# Patient Record
Sex: FEMALE | Race: WHITE | NOT HISPANIC OR LATINO | Employment: OTHER | URBAN - METROPOLITAN AREA
[De-identification: names, ages, dates, MRNs, and addresses within clinical notes are randomized per-mention and may not be internally consistent; named-entity substitution may affect disease eponyms.]

---

## 2017-01-09 ENCOUNTER — OFFICE VISIT (OUTPATIENT)
Dept: OBGYN | Facility: CLINIC | Age: 72
End: 2017-01-09
Payer: COMMERCIAL

## 2017-01-09 VITALS
WEIGHT: 270 LBS | HEART RATE: 115 BPM | BODY MASS INDEX: 40.92 KG/M2 | SYSTOLIC BLOOD PRESSURE: 147 MMHG | DIASTOLIC BLOOD PRESSURE: 94 MMHG | HEIGHT: 68 IN

## 2017-01-09 DIAGNOSIS — N81.3 UTEROVAGINAL PROLAPSE, COMPLETE: Primary | ICD-10-CM

## 2017-01-09 DIAGNOSIS — Z46.89 PESSARY MAINTENANCE: ICD-10-CM

## 2017-01-09 PROCEDURE — 99213 OFFICE O/P EST LOW 20 MIN: CPT | Performed by: OBSTETRICS & GYNECOLOGY

## 2017-01-09 NOTE — NURSING NOTE
"Initial /94 mmHg  Pulse 115  Ht 5' 7.5\" (1.715 m)  Wt 270 lb (122.471 kg)  BMI 41.64 kg/m2 Estimated body mass index is 41.64 kg/(m^2) as calculated from the following:    Height as of this encounter: 5' 7.5\" (1.715 m).    Weight as of this encounter: 270 lb (122.471 kg). .      "

## 2017-01-09 NOTE — PROGRESS NOTES
"CC:  pessary check  HPI:  Melania Alicea is a 71 year old female     pessary Fit with Gelhorn pessary 6/9/2014 is in place.   No vaginal bleeding or pain noted.  Patient is  not removing the pessary     Allergies: Nickel and Sulfa drugs    ROS:  as per HPI      EXAM:  Blood pressure 147/94, pulse 115, height 5' 7.5\" (1.715 m), weight 270 lb (122.471 kg), not currently breastfeeding.  General - pleasant female in no acute distress.  Pelvic - EG: normal adult female, BUS: within normal limits, Vagina: slightly atrophic, no discharge, vaginal walls all WNL, no lesions seen.   Rectovaginal - deferred.  Psychiactric - appropriate mood and affect  Neurological - alert and oriented X 3    Gelhorn pessary was removed, and a new Gelhorn was  inserted.  A vaginal inspection was done.      ASSESSMENT/PLAN:  (N81.3) Uterovaginal prolapse, complete  (primary encounter diagnosis)  Comment:   Plan: FIT/INSERT INTRAVAG SUPPORT DEVICE/PESSARY                    Will have patient continue use of vaginal estrogen tablets 2x weekly  and RTC in 3 months for a re-check of the vaginal walls. Will call clinic and come in sooner if problems developing.    Jarvis Walls MD    "

## 2017-01-18 ENCOUNTER — TELEPHONE (OUTPATIENT)
Dept: OBGYN | Facility: CLINIC | Age: 72
End: 2017-01-18

## 2017-01-18 NOTE — Clinical Note
[facility]  [address] [place] [address]  [phone]    January 18, 2017    Melania Alicea  [address]  [place] [address]          Dear Melania,    This letter is to remind you that you are due for your mammogram.    Please call [phone] to schedule your appointment at your earliest convenience.     If you have transferred your care elsewhere, please contact our office and we would be happy to forward your records. If you have any financial concerns regarding this appointment, please call so that we can discuss this further.      Sincerely,    Lisa Quarles MD/tk

## 2017-01-18 NOTE — TELEPHONE ENCOUNTER
Panel Management Review      Patient has the following on her problem list: None      Composite cancer screening  Chart review shows that this patient is due/due soon for the following Mammogram  Summary:    Patient is due/failing the following:   MAMMOGRAM    Action needed:   Patient needs office visit for mammogram.    Type of outreach:    Sent letter.    Questions for provider review:    None                                                                                                                                    Maria De Jesus Ferrer

## 2017-03-15 ENCOUNTER — OFFICE VISIT (OUTPATIENT)
Dept: FAMILY MEDICINE | Facility: CLINIC | Age: 72
End: 2017-03-15
Payer: COMMERCIAL

## 2017-03-15 VITALS
OXYGEN SATURATION: 99 % | TEMPERATURE: 99 F | WEIGHT: 273 LBS | SYSTOLIC BLOOD PRESSURE: 146 MMHG | BODY MASS INDEX: 42.13 KG/M2 | HEART RATE: 72 BPM | DIASTOLIC BLOOD PRESSURE: 76 MMHG

## 2017-03-15 DIAGNOSIS — I87.2 VENOUS STASIS DERMATITIS OF RIGHT LOWER EXTREMITY: ICD-10-CM

## 2017-03-15 DIAGNOSIS — L03.115 CELLULITIS OF RIGHT LOWER EXTREMITY: Primary | ICD-10-CM

## 2017-03-15 DIAGNOSIS — G60.0 CHARCOT MARIE TOOTH MUSCULAR ATROPHY: ICD-10-CM

## 2017-03-15 DIAGNOSIS — E66.01 MORBID OBESITY, UNSPECIFIED OBESITY TYPE (H): ICD-10-CM

## 2017-03-15 PROCEDURE — 99214 OFFICE O/P EST MOD 30 MIN: CPT | Performed by: NURSE PRACTITIONER

## 2017-03-15 PROCEDURE — 87186 SC STD MICRODIL/AGAR DIL: CPT | Performed by: NURSE PRACTITIONER

## 2017-03-15 PROCEDURE — 87077 CULTURE AEROBIC IDENTIFY: CPT | Performed by: NURSE PRACTITIONER

## 2017-03-15 PROCEDURE — 87070 CULTURE OTHR SPECIMN AEROBIC: CPT | Performed by: NURSE PRACTITIONER

## 2017-03-15 RX ORDER — CEPHALEXIN 500 MG/1
500 CAPSULE ORAL 3 TIMES DAILY
Qty: 30 CAPSULE | Refills: 0 | Status: SHIPPED | OUTPATIENT
Start: 2017-03-15 | End: 2017-03-29

## 2017-03-15 RX ORDER — MUPIROCIN 20 MG/G
OINTMENT TOPICAL 3 TIMES DAILY
Qty: 22 G | Refills: 1 | Status: SHIPPED | OUTPATIENT
Start: 2017-03-15 | End: 2017-03-20

## 2017-03-15 NOTE — PROGRESS NOTES
SUBJECTIVE:                                                    Melania Alicea is a 71 year old female who presents to clinic today for the following health issues:      Rash      Duration: 1 week     Description  Location: right lower leg   Psoriasis with open lesions.   Redness and LE pain.   Itching: no    Intensity:  moderate    Accompanying signs and symptoms: drainage started about 3 days ago     History (similar episodes/previous evaluation): None    Precipitating or alleviating factors:  New exposures:  None  Recent travel: no      Therapies tried and outcome: none     Lower extremity weakness and knee pain chronic and ongoing  -------------------------------------    Problem list and histories reviewed & adjusted, as indicated.    ROS:   ROS: 10 point ROS neg other than the symptoms noted above in the HPI.      OBJECTIVE:  /76  Pulse 72  Temp 99  F (37.2  C) (Tympanic)  Wt 273 lb (123.8 kg)  SpO2 99%  BMI 42.13 kg/m2  EXAM:  GENERAL APPEARANCE: healthy, alert and mild  distress  EYES: EOMI,  PERRL  HENT: ear canals and TM's normal and nose and mouth without ulcers or lesions  RESP: lungs clear to auscultation - no rales, rhonchi or wheezes  CV: regular rates and rhythm, normal S1 S2, no S3 or S4 and no murmur, click or rub -  ABDOMEN:  soft, nontender, no HSM or masses and bowel sounds normal  SKIN: Cellulitis right lower extremity with pussy drainage from the right posterior leg wound culture obtained  Venous insufficiency present bilaterally with varicosities present  Results for orders placed or performed in visit on 03/15/17   Wound Culture Aerobic Bacterial   Result Value Ref Range    Specimen Description Right Leg LOWER     Culture Micro (A)      Heavy growth Beta hemolytic Streptococcus group C  Heavy growth Coagulase negative Staphylococcus Susceptibility testing not   routinely done      Micro Report Status FINAL 03/18/2017     Organism: Heavy growth Beta hemolytic Streptococcus group C         "Susceptibility    Heavy growth beta hemolytic streptococcus group c (west gram pos panel) -  (no method available)     AMPICILLIN <=0.06 Susceptible  ug/mL     PENICILLIN <=0.03 Susceptible  ug/mL     VANCOMYCIN 0.25 Susceptible  ug/mL     CEFOTAXIME <=0.25 Susceptible  ug/mL     CEFTRIAXONE <=0.25 Susceptible  ug/mL     ERYTHROMYCIN >0.5 Resistant  ug/mL     CLINDAMYCIN >0.5 Resistant  ug/mL         ASSESSMENT/PLAN:  (L03.115) Cellulitis of right lower extremity  (primary encounter diagnosis)  Plan: cephALEXin (KEFLEX) 500 MG capsule, HOME CARE         NURSING REFERRAL, Wound Culture Aerobic         Bacterial,         Start oral antibiotic  Wound care for daily dressing changes  Follow-up next week    (I83.11) Venous stasis dermatitis of right lower extremity  Comment: Symptomatic cares strategies reviewed  Plan: Ace wrap from knees to toes recommended on in the morning off at night     (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    Weight as of this encounter: 273 lb (123.8 kg).  Plan:   Continue to work on dietary changes to promote weight loss and increasing physical activity as able    Call or return to the clinic with any worsening of symptoms or no resolution. Patient/Parent verbalized understanding and is in agreement. Medication side effects reviewed.   Current Outpatient Prescriptions   Medication Sig Dispense Refill     cephALEXin (KEFLEX) 500 MG capsule Take 1 capsule (500 mg) by mouth 3 times daily 30 capsule 0     mupirocin (BACTROBAN) 2 % ointment Apply topically 3 times daily for 5 days 22 g 1     estradiol (ESTRACE) 0.5 MG tablet Place vaginally twice a week 24 tablet 3     VITAMIN D, CHOLECALCIFEROL, " PO Take 1,000 Units by mouth daily       ADVIL 200 MG PO CAPS ONE TO TWO TABLETS EVERY 6-8 HOURS AS NEEDED  0     [facility]  [phone] [address] [address]

## 2017-03-15 NOTE — NURSING NOTE
"Chief Complaint   Patient presents with     Derm Problem     right leg rash swelling drainage 1 week        Initial /72  Pulse 72  Temp 99  F (37.2  C) (Tympanic)  Wt 273 lb (123.8 kg)  SpO2 99%  BMI 42.13 kg/m2 Estimated body mass index is 42.13 kg/(m^2) as calculated from the following:    Height as of 1/9/17: 5' 7.5\" (1.715 m).    Weight as of this encounter: 273 lb (123.8 kg).  Medication Reconciliation: complete    Health Maintenance that is potentially due pending provider review:  NONE    n/a    "

## 2017-03-15 NOTE — PATIENT INSTRUCTIONS
Discharge Instructions for Cellulitis  You have been diagnosed with cellulitis. This is an infection in the deepest layer of the skin. In some cases, the infection also affects the muscle. Cellulitis is caused by bacteria. The bacteria can enter the body through broken skin. This can happen with a cut, scratch, animal bite, or an insect bite that has been scratched. You may have been treated in the hospital with antibiotics and fluids. You will likely be given a prescription for antibiotics to take at home. This sheet will help you take care of yourself at home.  Home Care  When you are home:    Take the prescribed antibiotic medication you are given as directed until it is gone. Take it even if you feel better. It treats the infection and stops it from returning. Not taking all of the medication can make future infections hard to treat.    Keep the infected area clean.    When possible, raise the infected area above the level of your heart. This helps keep swelling down.    Talk to your doctor if you are in pain. Ask what kind of over-the-counter medication you can take for pain.    Apply clean bandages as advised.    Take your temperature once a day for a week.    Wash your hands often to prevent spreading the infection.  In the future, wash your hands before and after you touch cuts, scratches, or bandages. This will help prevent infection.   When to Call Your Doctor  Call your doctor immediately if you have any of the following:    Vomiting    Fever of100.4 F (38 C) or higher, or as directed by your health care provider    Shaking chills    Redness that gets worse in or around the infected area    Swelling of the infected area    Pain that gets worse in or around the infected area    Difficulty or pain when moving the joints above or below the infected area    Discharge or pus draining from the area     6610-3043 [employer]. [address] [place] [address], [place] [address]. All rights reserved. This  information is not intended as a substitute for professional medical care. Always follow your healthcare professional's instructions.        Understanding Chronic Venous Insufficiency  Problems with the veins in the legs may lead to chronic venous insufficiency (CVI). CVI means that there is a long-term problem with the veins not working well. Because of it, blood stays in the legs and causes swelling.   Two problems that may lead to chronic venous insufficiency are:    Damaged valves. Valves keep blood flowing from the legs through the blood vessels and back to the heart. When the valves are damaged, blood does not flow as well.     Deep vein thrombosis (DVT). Blood clots may form in the deep veins of the legs. This may cause pain, redness, and swelling in the legs. It may also block the flow of blood back to the heart. Call your health care provider if you have these symptoms.    A blood clot in the leg can also break off and travel to the lungs. This is called pulmonary embolism (PE). In the lungs, the clot can cut off the flow of blood. This may cause chest pain, trouble breathing, sweating, a fast heartbeat, coughing (may cough up blood), and fainting. It is a medical emergency and may cause death. Call 911 if you have these symptoms.    Health care providers call the 2 conditions, DVT and PE, venous thromboembolism (VTE).  CVI can t be cured, but you can control leg swelling to reduce the likelihood of ulcers (sores).  Recognizing the symptoms    If you stand or sit with your feet down for long periods, your legs may ache or feel heavy.    Swollen ankles are possibly the most common symptom of CVI.    As swelling increases, the skin over your ankles may show red spots or a brownish tinge. The skin may feel leathery or scaly, and may start to itch.    If swelling is not controlled, an ulcer (open wound) may form.  What you can do  Reduce your risk of developing ulcers by doing the following:    Increase blood  flow back to your heart by elevating your legs, exercising daily, and wearing elastic stockings.    Boost blood flow in your legs by losing excess weight.    If you must stand or sit in 1 place for a period of time, keep your blood moving by wiggling your toes, shifting your body position, and rising up on the balls of your feet.      [id number] [employer]. [address] [place] [address], [place] [address]. All rights reserved. This information is not intended as a substitute for professional medical care. Always follow your healthcare professional's instructions.

## 2017-03-15 NOTE — MR AVS SNAPSHOT
After Visit Summary   3/15/2017    Melania Alicea    MRN: [id number]           Patient Information     Date Of Birth          1945        Visit Information        Provider Department      3/15/2017 10:20 AM Betty Arenas APRN [facility]        Today's Diagnoses     Cellulitis of right lower extremity    -  1    Venous stasis dermatitis of right lower extremity        Charcot Emilee Tooth muscular atrophy        Morbid obesity, unspecified obesity type (H)          Care Instructions      Discharge Instructions for Cellulitis  You have been diagnosed with cellulitis. This is an infection in the deepest layer of the skin. In some cases, the infection also affects the muscle. Cellulitis is caused by bacteria. The bacteria can enter the body through broken skin. This can happen with a cut, scratch, animal bite, or an insect bite that has been scratched. You may have been treated in the hospital with antibiotics and fluids. You will likely be given a prescription for antibiotics to take at home. This sheet will help you take care of yourself at home.  Home Care  When you are home:    Take the prescribed antibiotic medication you are given as directed until it is gone. Take it even if you feel better. It treats the infection and stops it from returning. Not taking all of the medication can make future infections hard to treat.    Keep the infected area clean.    When possible, raise the infected area above the level of your heart. This helps keep swelling down.    Talk to your doctor if you are in pain. Ask what kind of over-the-counter medication you can take for pain.    Apply clean bandages as advised.    Take your temperature once a day for a week.    Wash your hands often to prevent spreading the infection.  In the future, wash your hands before and after you touch cuts, scratches, or bandages. This will help prevent infection.   When to Call Your Doctor  Call your doctor  immediately if you have any of the following:    Vomiting    Fever of100.4 F (38 C) or higher, or as directed by your health care provider    Shaking chills    Redness that gets worse in or around the infected area    Swelling of the infected area    Pain that gets worse in or around the infected area    Difficulty or pain when moving the joints above or below the infected area    Discharge or pus draining from the area     9109-8061 [employer]. [address] [place] [address], [place] [address]. All rights reserved. This information is not intended as a substitute for professional medical care. Always follow your healthcare professional's instructions.        Understanding Chronic Venous Insufficiency  Problems with the veins in the legs may lead to chronic venous insufficiency (CVI). CVI means that there is a long-term problem with the veins not working well. Because of it, blood stays in the legs and causes swelling.   Two problems that may lead to chronic venous insufficiency are:    Damaged valves. Valves keep blood flowing from the legs through the blood vessels and back to the heart. When the valves are damaged, blood does not flow as well.     Deep vein thrombosis (DVT). Blood clots may form in the deep veins of the legs. This may cause pain, redness, and swelling in the legs. It may also block the flow of blood back to the heart. Call your health care provider if you have these symptoms.    A blood clot in the leg can also break off and travel to the lungs. This is called pulmonary embolism (PE). In the lungs, the clot can cut off the flow of blood. This may cause chest pain, trouble breathing, sweating, a fast heartbeat, coughing (may cough up blood), and fainting. It is a medical emergency and may cause death. Call 911 if you have these symptoms.    Health care providers call the 2 conditions, DVT and PE, venous thromboembolism (VTE).  CVI can t be cured, but you can control leg swelling to reduce  the likelihood of ulcers (sores).  Recognizing the symptoms    If you stand or sit with your feet down for long periods, your legs may ache or feel heavy.    Swollen ankles are possibly the most common symptom of CVI.    As swelling increases, the skin over your ankles may show red spots or a brownish tinge. The skin may feel leathery or scaly, and may start to itch.    If swelling is not controlled, an ulcer (open wound) may form.  What you can do  Reduce your risk of developing ulcers by doing the following:    Increase blood flow back to your heart by elevating your legs, exercising daily, and wearing elastic stockings.    Boost blood flow in your legs by losing excess weight.    If you must stand or sit in 1 place for a period of time, keep your blood moving by wiggling your toes, shifting your body position, and rising up on the balls of your feet.      4661-9330 The "[employer]. [address] [place] [address]. All rights reserved. This information is not intended as a substitute for professional medical care. Always follow your healthcare professional's instructions.              Follow-ups after your visit        Additional Services     HOME CARE NURSING REFERRAL       **Order classes of: [facility], [facility] and [facility] will route to the Home Care and Hospice Referral Pool.  Home Care or Hospice will then contact the patient to schedule their appointment.**    If you do not hear from Home Care and Hospice, or you would like to call to schedule, please call the referring place of service that your provider has listed below.  ______________________________________________________________________    Your provider has referred you to: [facility]: [facility] [phone]   [email or web address]    Extended Emergency Contact Information  Primary Emergency Contact: JUAN stephens   [facility]  Phone: [phone]  Work Phone: [phone]  Mobile Phone: [phone]  Relation: Daughter    Patient Anticipated Discharge Date: March 15, 2017     RN, PT, HHA to begin 24 - 48 hours after discharge.  PLEASE EVALUATE AND TREAT (Evaluation timeline is 24 - 48 hrs. Please call if there is need for a variance to this timeline).    REASON FOR REFERRAL: Wound Care - Specialty Treatment Orders: RLE cellulitis     ADDITIONAL SERVICES NEEDED:     OTHER PERTINENT INFORMATION: Patient was last seen by provider on 3/15/2017 for cellulitis and wound care RLE .    Current Outpatient Prescriptions:  cephALEXin (KEFLEX) 500 MG capsule, Take 1 capsule (500 mg) by mouth 3 times daily, Disp: 30 capsule, Rfl: 0  mupirocin (BACTROBAN) 2 % ointment, Apply topically 3 times daily for 5 days, Disp: 22 g, Rfl: 1  estradiol (ESTRACE) 0.5 MG tablet, Place vaginally twice a week, Disp: 24 tablet, Rfl: 3  VITAMIN D, CHOLECALCIFEROL, PO, Take 1,000 Units by mouth daily, Disp: , Rfl:   ADVIL 200 MG PO CAPS, ONE TO TWO TABLETS EVERY 6-8 HOURS AS NEEDED, Disp: , Rfl: 0      Patient Active Problem List:     Charcot Emilee Tooth muscular atrophy     Right knee pain     Obesity     Mixed incontinence urge and stress     Infection of eyelid     CARDIOVASCULAR SCREENING; LDL GOAL LESS THAN 160     Advanced directives, counseling/discussion     Tubular adenoma of rectum     Uterovaginal prolapse, complete     Vitamin D deficiency     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 difficile colitis     Pessary maintenance      Documentation of Face to Face and Certification for Home Health Services    I certify that patientMelania is under my care and that I, or a Nurse Practitioner or Physician's Assistant working with me, had a face-to-face encounter that meets the physician face-to-face encounter requirements with this patient on: 3/15/2017.    This encounter with the patient was in whole, or in part, for the  following medical condition, which is the primary reason for Home Health Care: YES .    I certify that, based on my findings, the following services are medically necessary Home Health Services: Nursing    My clinical findings support the need for the above services because: Nurse is needed: To provide assessment and oversight required in the home to assure adherence to the medical plan due to: WOUND RLE and CELLULITIS..    Further, I certify that my clinical findings support that this patient is homebound (i.e. absences from home require considerable and taxing effort and are for medical reasons or Judaism services or infrequently or of short duration when for other reasons) because: Leaving home is medically contraindicated for the following reason(s): Infection risk / immunocompromised state where it is safer for them to receive services in the home.. and Requires assistance of another person or specialized equipment to access medical services because patient: Has prohibitive pain during ambulation. and Is unable to walk greater than 100 feet without rest..    Based on the above findings, I certify that this patient is confined to the home and needs intermittent skilled nursing care, physical therapy and/or speech therapy.  The patient is under my care, and I have initiated the establishment of the plan of care.  This patient will be followed by a physician who will periodically review the plan of care.    Physician/Provider to provide follow up care: Betty Arenas certified Physician at time of discharge: DR PEG THOMPSON    Please be aware that coverage of these services is subject to the terms and limitations of your health insurance plan.  Call member services at your health plan with any benefit or coverage questions.                  Your next 10 appointments already scheduled     Apr 10, 2017 11:00 AM DILAN   SHORT with Kristi Merchant MD   [facility]  [place] ([facility])    [address]  [facility] [address]   [phone]              Who to contact     If you have questions or need follow up information about today's clinic visit or your schedule please contact [facility] directly at [phone].  Normal or non-critical lab and imaging results will be communicated to you by MyChart, letter or phone within 4 business days after the clinic has received the results. If you do not hear from us within 7 days, please contact the clinic through [employer] or phone. If you have a critical or abnormal lab result, we will notify you by phone as soon as possible.  Submit refill requests through [employer] or call your pharmacy and they will forward the refill request to us. Please allow 3 business days for your refill to be completed.          Additional Information About Your Visit        MyChart Information     [employer] gives you secure access to your electronic health record. If you see a primary care provider, you can also send messages to your care team and make appointments. If you have questions, please call your primary care clinic.  If you do not have a primary care provider, please call [phone] and they will assist you.        Care EveryWhere ID     This is your Care EveryWhere ID. This could be used by other organizations to access your [place] medical records  [id number]        Your Vitals Were     Pulse Temperature Pulse Oximetry BMI (Body Mass Index)          72 99  F (37.2  C) (Tympanic) 99% 42.13 kg/m2         Blood Pressure from Last 3 Encounters:   03/15/17 146/76   01/09/17 (!) 147/94   11/10/16 138/62    Weight from Last 3 Encounters:   03/15/17 273 lb (123.8 kg)   01/09/17 270 lb (122.5 kg)   11/10/16 271 lb (122.9 kg)              We Performed the Following     Gram stain     HOME CARE NURSING REFERRAL     Wound Culture Aerobic Bacterial          Today's Medication Changes          These changes are  accurate as of: 3/15/17 11:36 AM.  If you have any questions, ask your nurse or doctor.               Start taking these medicines.        Dose/Directions    cephALEXin 500 MG capsule   Commonly known as:  KEFLEX   Used for:  Cellulitis of right lower extremity, Venous stasis dermatitis of right lower extremity   Started by:  Betty Arenas APRN CNP        Dose:  500 mg   Take 1 capsule (500 mg) by mouth 3 times daily   Quantity:  30 capsule   Refills:  0       mupirocin 2 % ointment   Commonly known as:  BACTROBAN   Used for:  Venous stasis dermatitis of right lower extremity   Started by:  Betty Arenas APRN CNP        Apply topically 3 times daily for 5 days   Quantity:  22 g   Refills:  1         Stop taking these medicines if you haven't already. Please contact your care team if you have questions.     metroNIDAZOLE 500 MG tablet   Commonly known as:  FLAGYL   Stopped by:  Betty Arenas APRN CNP           mometasone 0.1 % ointment   Commonly known as:  ELOCON   Stopped by:  Betty Arenas APRN CNP                Where to get your medicines      These medications were sent to [facility] - [place], [place] - [address]  [address] [place] [address]     Phone:  [phone]     cephALEXin 500 MG capsule    mupirocin 2 % ointment                Primary Care Provider Office Phone # Fax #    JOANN oDrsey [phone]       [facility] [address] [facility] [address]        Thank you!     Thank you for choosing [facility]  for your care. Our goal is always to provide you with excellent care. Hearing back from our patients is one way we can continue to improve our services. Please take a few minutes to complete the written survey that you may receive in the mail after your visit with us. Thank you!             Your Updated Medication List - Protect others around you: Learn how to safely use, store and throw away  your medicines at www.disposemymeds.org.          This list is accurate as of: 3/15/17 11:36 AM.  Always use your most recent med list.                   Brand Name Dispense Instructions for use    ADVIL 200 MG capsule   Generic drug:  ibuprofen      ONE TO TWO TABLETS EVERY 6-8 HOURS AS NEEDED       cephALEXin 500 MG capsule    KEFLEX    30 capsule    Take 1 capsule (500 mg) by mouth 3 times daily       estradiol 0.5 MG tablet    ESTRACE    24 tablet    Place vaginally twice a week       mupirocin 2 % ointment    BACTROBAN    22 g    Apply topically 3 times daily for 5 days       VITAMIN D (CHOLECALCIFEROL) PO      Take 1,000 Units by mouth daily

## 2017-03-18 ENCOUNTER — TELEPHONE (OUTPATIENT)
Dept: CARE COORDINATION | Facility: CLINIC | Age: 72
End: 2017-03-18

## 2017-03-18 LAB
BACTERIA SPEC CULT: ABNORMAL
MICRO REPORT STATUS: ABNORMAL
MICROORGANISM SPEC CULT: ABNORMAL
SPECIMEN SOURCE: ABNORMAL

## 2017-03-18 NOTE — TELEPHONE ENCOUNTER
Ok to admit to [facility] for skilled Nursing  2 x week and then weekly for skin/wound cares    Pia Weston RN  [place] [facility]   [phone]    [facility] now requests orders and shares plan of care/discharge summaries for some patients through [facility].  Please REPLY TO THIS MESSAGE in order to give authorization for orders when needed.  This is considered a verbal order, you will still receive a faxed copy of orders for signature.  Thank you for your assistance in improving collaboration for our patients.

## 2017-03-21 ENCOUNTER — MYC MEDICAL ADVICE (OUTPATIENT)
Dept: FAMILY MEDICINE | Facility: CLINIC | Age: 72
End: 2017-03-21

## 2017-03-23 ENCOUNTER — TELEPHONE (OUTPATIENT)
Dept: FAMILY MEDICINE | Facility: CLINIC | Age: 72
End: 2017-03-23

## 2017-03-23 NOTE — TELEPHONE ENCOUNTER
Reason for Call:  Form, our goal is to have forms completed with 72 hours, however, some forms may require a visit or additional information.    Type of letter, form or note:  [facility]    Who is the form from?: [facility] (if other please explain)    Where did the form come from: form was faxed in    What clinic location was the form placed at?: [place]    Where the form was placed: 's Box    Call taken on 3/23/2017 at 9:22 AM by Annabelle Daniels

## 2017-03-29 ENCOUNTER — OFFICE VISIT (OUTPATIENT)
Dept: FAMILY MEDICINE | Facility: CLINIC | Age: 72
End: 2017-03-29
Payer: COMMERCIAL

## 2017-03-29 VITALS
DIASTOLIC BLOOD PRESSURE: 80 MMHG | TEMPERATURE: 99.1 F | OXYGEN SATURATION: 96 % | WEIGHT: 274 LBS | HEART RATE: 75 BPM | SYSTOLIC BLOOD PRESSURE: 122 MMHG | BODY MASS INDEX: 42.28 KG/M2

## 2017-03-29 DIAGNOSIS — I87.2 VENOUS STASIS DERMATITIS OF RIGHT LOWER EXTREMITY: ICD-10-CM

## 2017-03-29 DIAGNOSIS — L03.115 CELLULITIS OF RIGHT LOWER EXTREMITY: Primary | ICD-10-CM

## 2017-03-29 DIAGNOSIS — A49.01 INFECTION DUE TO STAPHYLOCOCCUS AUREUS: ICD-10-CM

## 2017-03-29 PROCEDURE — 99214 OFFICE O/P EST MOD 30 MIN: CPT | Performed by: NURSE PRACTITIONER

## 2017-03-29 RX ORDER — CEPHALEXIN 500 MG/1
500 CAPSULE ORAL 3 TIMES DAILY
Qty: 30 CAPSULE | Refills: 0 | Status: SHIPPED | OUTPATIENT
Start: 2017-03-29 | End: 2017-05-11

## 2017-03-29 RX ORDER — TRIAMCINOLONE ACETONIDE OINTMENT USP, 0.05% 0.5 MG/G
OINTMENT TOPICAL 2 TIMES DAILY PRN
Qty: 17 G | Refills: 0 | Status: SHIPPED | OUTPATIENT
Start: 2017-03-29 | End: 2017-03-29

## 2017-03-29 RX ORDER — TRIAMCINOLONE ACETONIDE 5 MG/G
OINTMENT TOPICAL
Qty: 15 G | Refills: 2 | Status: SHIPPED | OUTPATIENT
Start: 2017-03-29 | End: 2017-05-11

## 2017-03-29 NOTE — PATIENT INSTRUCTIONS
Trail Unna boot today. RN evaluation of tolerance tomorrow.   If tolerated then can use and change 3 times weekly.  If not tolerated can use mediplast honey and triamcinolone for symptomatic relief.   Continue keflex for 10 more days.   Tylenol #3 for pain not relieved by more conservative measures.       UNNA BOOT   Elevate the affected extremity when seated and avoid prolonged standing. If pain increases or new pain is experienced that is not alleviated with elevation, or there is discoloration or numbness of the foot or toes, the bandage should be removed immediately.   Keep the dressing dry; a plastic bag can be placed over the leg when showering.   If tolerating this well then compression bandage changed once or twice per week. If there is excessive drainage or foul odor,  return to have the dressing changed; more frequent dressing changes up to three times a week may be needed until drainage is controlled.

## 2017-03-29 NOTE — NURSING NOTE
"Chief Complaint   Patient presents with     Leg Swelling     recheck cellulitis       Initial /80  Pulse 75  Temp 99.1  F (37.3  C) (Tympanic)  Wt 274 lb (124.3 kg)  SpO2 96%  BMI 42.28 kg/m2 Estimated body mass index is 42.28 kg/(m^2) as calculated from the following:    Height as of 1/9/17: 5' 7.5\" (1.715 m).    Weight as of this encounter: 274 lb (124.3 kg).  Medication Reconciliation: complete    Health Maintenance that is potentially due pending provider review:  NONE    n/a      "

## 2017-03-29 NOTE — MR AVS SNAPSHOT
After Visit Summary   3/29/2017    Melania Alicea    MRN: [id number]           Patient Information     Date Of Birth          1945        Visit Information        Provider Department      3/29/2017 2:00 PM Betty Arenas APRN CNP [facility]        Today's Diagnoses     Cellulitis of right lower extremity    -  1    Venous stasis dermatitis of right lower extremity          Care Instructions    Trail Unna boot today. RN evaluation of tolerance tomorrow.   If tolerated then can use and change 3 times weekly.  If not tolerated can use mediplast honey and triamcinolone for symptomatic relief.   Continue keflex for 10 more days.   Tylenol #3 for pain not relieved by more conservative measures.       UNNA BOOT   Elevate the affected extremity when seated and avoid prolonged standing. If pain increases or new pain is experienced that is not alleviated with elevation, or there is discoloration or numbness of the foot or toes, the bandage should be removed immediately.   Keep the dressing dry; a plastic bag can be placed over the leg when showering.   If tolerating this well then compression bandage changed once or twice per week. If there is excessive drainage or foul odor,  return to have the dressing changed; more frequent dressing changes up to three times a week may be needed until drainage is controlled.          Follow-ups after your visit        Your next 10 appointments already scheduled     Apr 03, 2017  2:20 PM CDT   US LOWER EXTREMITY VENOUS DUPLEX RIGHT with RCUS[facility]   [facility]    [address] [address] [phone]           Please bring a list of your medicines (including vitamins, minerals and over-the-counter drugs). Also, tell your doctor about any allergies you may have. Wear comfortable clothes and leave your valuables at home.  You do not need to do anything special to prepare for your exam.   Please call the Imaging Department at your exam site with any questions.            Apr 10, 2017 11:00 AM CDT   SHORT with Kristi Merchant MD   [facility]    [address] [address]   [phone]              Future tests that were ordered for you today     Open Future Orders        Priority Expected Expires Ordered    US Lower Extremity Venous Duplex Right Routine  3/29/2018 3/29/2017            Who to contact     If you have questions or need follow up information about today's clinic visit or your schedule please contact [facility] directly at [phone].  Normal or non-critical lab and imaging results will be communicated to you by MyChart, letter or phone within 4 business days after the clinic has received the results. If you do not hear from us within 7 days, please contact the clinic through [employer] or phone. If you have a critical or abnormal lab result, we will notify you by phone as soon as possible.  Submit refill requests through [employer] or call your pharmacy and they will forward the refill request to us. Please allow 3 business days for your refill to be completed.          Additional Information About Your Visit        MyChart Information     [employer] gives you secure access to your electronic health record. If you see a primary care provider, you can also send messages to your care team and make appointments. If you have questions, please call your primary care clinic.  If you do not have a primary care provider, please call [phone] and they will assist you.        Care EveryWhere ID     This is your Care EveryWhere ID. This could be used by other organizations to access your [place] medical records  [id number]        Your Vitals Were     Pulse Temperature Pulse Oximetry BMI (Body Mass Index)          75 99.1  F (37.3  C) (Tympanic) 96% 42.28 kg/m2         Blood Pressure from Last 3 Encounters:    03/29/17 122/80   03/15/17 146/76   01/09/17 (!) 147/94    Weight from Last 3 Encounters:   03/29/17 274 lb (124.3 kg)   03/15/17 273 lb (123.8 kg)   01/09/17 270 lb (122.5 kg)                 Today's Medication Changes          These changes are accurate as of: 3/29/17  2:59 PM.  If you have any questions, ask your nurse or doctor.               Start taking these medicines.        Dose/Directions    acetaminophen-codeine 300-30 MG per tablet   Commonly known as:  TYLENOL #3   Used for:  Cellulitis of right lower extremity   Started by:  Betty Arenas APRN CNP        Dose:  1 tablet   Take 1 tablet by mouth every 6 hours as needed for pain maximum 3 tablet(s) per day   Quantity:  18 tablet   Refills:  0       triamcinolone acetonide 0.05 % Oint   Used for:  Cellulitis of right lower extremity   Started by:  Betty Arenas APRN CNP        Externally apply topically 2 times daily as needed   Quantity:  17 g   Refills:  0            Where to get your medicines      These medications were sent to [facility] - [place], [place] - [address]  [address] [place] [address]     Phone:  [phone]     cephALEXin 500 MG capsule    triamcinolone acetonide 0.05 % Oint         Some of these will need a paper prescription and others can be bought over the counter.  Ask your nurse if you have questions.     Bring a paper prescription for each of these medications     acetaminophen-codeine 300-30 MG per tablet                Primary Care Provider Office Phone # Fax #    JOANN Dorsey [phone]       [facility] [address] [facility] [address]        Thank you!     Thank you for choosing [facility]  for your care. Our goal is always to provide you with excellent care. Hearing back from our patients is one way we can continue to improve our services. Please take a few minutes to complete the written survey that you may receive in the mail after  your visit with us. Thank you!             Your Updated Medication List - Protect others around you: Learn how to safely use, store and throw away your medicines at www.disposemymeds.org.          This list is accurate as of: 3/29/17  2:59 PM.  Always use your most recent med list.                   Brand Name Dispense Instructions for use    acetaminophen-codeine 300-30 MG per tablet    TYLENOL #3    18 tablet    Take 1 tablet by mouth every 6 hours as needed for pain maximum 3 tablet(s) per day       ADVIL 200 MG capsule   Generic drug:  ibuprofen      ONE TO TWO TABLETS EVERY 6-8 HOURS AS NEEDED       cephALEXin 500 MG capsule    KEFLEX    30 capsule    Take 1 capsule (500 mg) by mouth 3 times daily       estradiol 0.5 MG tablet    ESTRACE    24 tablet    Place vaginally twice a week       [facility] WOUND/BURN DRESSING EX          triamcinolone acetonide 0.05 % Oint     17 g    Externally apply topically 2 times daily as needed       TRIDERMA FORTE Crea          VITAMIN D (CHOLECALCIFEROL) PO      Take 1,000 Units by mouth daily

## 2017-03-30 ENCOUNTER — TELEPHONE (OUTPATIENT)
Dept: CARE COORDINATION | Facility: CLINIC | Age: 72
End: 2017-03-30

## 2017-03-30 NOTE — TELEPHONE ENCOUNTER
Would like to clarify UNNA boot orders, normally we change Unna boot 1 to 2 x a week in the home, would this be ok and please clarify how you would like medihoney used    Thank opal Weston RN  [facility] care and [facility]   [phone]    [facility] now requests orders and shares plan of care/discharge summaries for some patients through [employer].  Please REPLY TO THIS MESSAGE in order to give authorization for orders when needed.  This is considered a verbal order, you will still receive a faxed copy of orders for signature.  Thank you for your assistance in improving collaboration for our patients.

## 2017-03-30 NOTE — TELEPHONE ENCOUNTER
How is the patient tolerating the UNNA boot today?   If she is tolerating this and is able to ambulate without problems we could stick with that for a few weeks and recheck at that time.   Did they take this off and look at the skin today ?  Medihoney would be helpful on the open lesions she had when I saw her on 3/15 and a small lesion anterior upper shin 3/29  I agree she does not have to use this on the closed skin.   Thanks Betty Arenas FNP-BC

## 2017-03-30 NOTE — TELEPHONE ENCOUNTER
Pia MOREIRA called, states she doesn't think the medihoney would be beneficial for her legs, states the medihoney will cause break down of healthy tissue. Also wondering if you want unas boots or a compression bandage with coban and foam? Jenelle Amin RN

## 2017-03-30 NOTE — TELEPHONE ENCOUNTER
We placed this yesterday in clinic and the plan is to have the RN who is going out to the house today evaluate how she is tolerating this.   Recommend this be removed today and skin check done. If she is tolerating this ok then can be replaced 2x a week.   We will use the medihoney if she is not able to tolerate the Unna boot.    Her skin is so sensitive and she has multiple allergies to many topical agents used in the  I am not sure how she is going to tolerate this.   Please keep me posted on how things are going.   Thanks Betty Arenas [facility]  [phone]

## 2017-04-03 ENCOUNTER — MEDICAL CORRESPONDENCE (OUTPATIENT)
Dept: HEALTH INFORMATION MANAGEMENT | Facility: CLINIC | Age: 72
End: 2017-04-03

## 2017-04-03 ENCOUNTER — RADIANT APPOINTMENT (OUTPATIENT)
Dept: ULTRASOUND IMAGING | Facility: CLINIC | Age: 72
End: 2017-04-03
Attending: NURSE PRACTITIONER
Payer: COMMERCIAL

## 2017-04-03 DIAGNOSIS — L03.115 CELLULITIS OF RIGHT LOWER EXTREMITY: ICD-10-CM

## 2017-04-03 PROCEDURE — 93971 EXTREMITY STUDY: CPT | Mod: RT

## 2017-04-05 ENCOUNTER — TELEPHONE (OUTPATIENT)
Dept: FAMILY MEDICINE | Facility: CLINIC | Age: 72
End: 2017-04-05

## 2017-04-05 ENCOUNTER — OFFICE VISIT (OUTPATIENT)
Dept: FAMILY MEDICINE | Facility: CLINIC | Age: 72
End: 2017-04-05
Payer: COMMERCIAL

## 2017-04-05 VITALS
TEMPERATURE: 99.6 F | DIASTOLIC BLOOD PRESSURE: 70 MMHG | OXYGEN SATURATION: 96 % | BODY MASS INDEX: 42.22 KG/M2 | WEIGHT: 273.6 LBS | HEART RATE: 77 BPM | SYSTOLIC BLOOD PRESSURE: 110 MMHG

## 2017-04-05 DIAGNOSIS — I87.2 VENOUS (PERIPHERAL) INSUFFICIENCY: Primary | ICD-10-CM

## 2017-04-05 DIAGNOSIS — I89.0 LYMPHEDEMA OF BOTH LOWER EXTREMITIES: ICD-10-CM

## 2017-04-05 PROCEDURE — 99214 OFFICE O/P EST MOD 30 MIN: CPT | Mod: 25 | Performed by: NURSE PRACTITIONER

## 2017-04-05 PROCEDURE — 29580 STRAPPING UNNA BOOT: CPT | Performed by: NURSE PRACTITIONER

## 2017-04-05 NOTE — PATIENT INSTRUCTIONS
Lymphedema clinic referral   Adrienne boot for 2 more weeks.   Ok to take off to shower before GYN appt and have put on by RN next day.   Ok to take Unna boot off before your RN visit next to shower and have RN replace dressing.           Understanding Chronic Venous Insufficiency  Problems with the veins in the legs may lead to chronic venous insufficiency (CVI). CVI means that there is a long-term problem with the veins not working well. Because of it, blood stays in the legs and causes swelling.   Two problems that may lead to chronic venous insufficiency are:    Damaged valves. Valves keep blood flowing from the legs through the blood vessels and back to the heart. When the valves are damaged, blood does not flow as well.     Deep vein thrombosis (DVT). Blood clots may form in the deep veins of the legs. This may cause pain, redness, and swelling in the legs. It may also block the flow of blood back to the heart. Call your health care provider if you have these symptoms.    A blood clot in the leg can also break off and travel to the lungs. This is called pulmonary embolism (PE). In the lungs, the clot can cut off the flow of blood. This may cause chest pain, trouble breathing, sweating, a fast heartbeat, coughing (may cough up blood), and fainting. It is a medical emergency and may cause death. Call 911 if you have these symptoms.    Health care providers call the 2 conditions, DVT and PE, venous thromboembolism (VTE).  CVI can t be cured, but you can control leg swelling to reduce the likelihood of ulcers (sores).  Recognizing the symptoms    If you stand or sit with your feet down for long periods, your legs may ache or feel heavy.    Swollen ankles are possibly the most common symptom of CVI.    As swelling increases, the skin over your ankles may show red spots or a brownish tinge. The skin may feel leathery or scaly, and may start to itch.    If swelling is not controlled, an ulcer (open wound) may form.  What  you can do  Reduce your risk of developing ulcers by doing the following:    Increase blood flow back to your heart by elevating your legs, exercising daily, and wearing elastic stockings.    Boost blood flow in your legs by losing excess weight.    If you must stand or sit in 1 place for a period of time, keep your blood moving by wiggling your toes, shifting your body position, and rising up on the balls of your feet.      1527-0875 [employer]. [address] [place], [place] [id number]. All rights reserved. This information is not intended as a substitute for professional medical care. Always follow your healthcare professional's instructions.        Leg Swelling (1 Leg Only)  Swelling of the arms, feet, ankles, and legs is called edema. It is caused by extra fluid collecting in the tissues. Because of gravity, extra fluid in the body settles to the lowest part. That is why the legs and feet are most affected. You have swelling in only 1 leg.  Some of the causes for swelling in only one leg include:    Infection in the foot or leg    Long-term problem with a vein not working well (venous insufficiency)    Swollen, twisted vein in the leg (varicose veins)    Insect bite or sting on the foot or leg    Injury or recent surgery on the foot or leg    Blood clot in a deep vein of the leg (deep vein thrombosis or DVT)  Medical treatment will depend on what is causing your swelling.  Home care  Follow these guidelines when caring for yourself at home:    Don t wear tight clothing.    Keep your legs up while lying or sitting.    Take any medicines as directed.    If infection, injury, or recent surgery is the cause of your swelling, stay off your legs as much as possible until your symptoms get better.    If you have venous insufficiency or varicose veins, don t sit or  one place for long periods of time. Take breaks and walk around every few hours. Talk with your healthcare provider about wearing  support stockings to help lessen swelling during the day.  Follow-up care  Follow up with your health care provider as advised.  Call 911  Call 911 if any of these occur:    Shortness of breath or trouble breathing    Chest pain    Coughing up blood    Fainting or loss of consciousness   When to seek medical advice  Call your healthcare provider right away if any of these occur:    Increased pain, swelling, warmth, or redness of the leg, ankle, or foot    Fever of 100.4 F (38 C) or higher, or as directed by your healthcare provider    Weakness or dizziness    9180-3315 [employer]. [address] [place] [address]. All rights reserved. This information is not intended as a substitute for professional medical care. Always follow your healthcare professional's instructions.        Lymphedema  The lymphatic system is made up of lymph vessels and lymph nodes, which carry a fluid called lymph. Lymph consists of waste from the cells. This fluid drains through lymph vessels under the skin to nearby lymph nodes. Lymph nodes filter waste products from the cells and kill any bacteria present before returning the lymph fluid to your blood circulation.  When the lymph vessels are damaged, lymph fluid cannot drain from tissues. This causes the lymph fluid to back up leading to swelling. This most often affects the arms or legs. Signs of lymphedema include heaviness, stiffness, or aching in an arm or leg. The limb may swell. The skin might look red. Shoes and rings may feel tight. Ankles and wrists might become less flexible.  The most common cause of damage to the lymph system is surgery or radiation for breast or testicular cancer. Other causes include repeated skin infections (cellulitis), burns, or injury to the arms or legs. It can take many years for symptoms of lymphedema to appear. Once present, lymphedema can become a chronic condition. This means the problem can be managed but not  cured.   Treatment often includes use of compression garments, massage, and special exercises. Talk to your healthcare provider about these therapies and best treatment plan for you.  Home care  You can help keep the condition from getting worse. Follow all instructions you have been given. Do your exercises and wear your compression garments as recommended. Also, care for yourself as instructed.     Small skin injuries like a cut, burn, or insect bite are more likely to cause a skin infection. Take special care to avoid injury. If you have any signs of infection, call your healthcare provider right away.    Take care of your skin and nails. Moisturize dry skin. Wear protective gloves when doing chores such as gardening.     Don't wear tight clothing or jewelry on the affected arm or leg. Avoid carrying bags or other weight on the affected arm.    Save with an electric razor instead of a razor blade.    If at all possible, don t have blood pressure taken, get injections, or have blood drawn in the affected arm.    If a leg is involved, don t cross your legs when sitting. Don't go barefoot.    Avoid hot tubs, steam rooms, and saunas.  If you are at risk for lymphedema but have not developed it, these tips can help also help prevent it. Follow your healthcare provider's instructions.  Follow-up care  Follow up with your healthcare provider or as advised by our staff.  Lymphedema can change the appearance of your body. This can be emotionally difficult to adjust to. You may benefit from a support group where practical advice and emotional support is offered. Individual counseling is another option.  When to seek medical attention  Call your healthcare provider if any of the following occur:    Swelling worsens    Rash, blistering, or other skin changes on the affected limb    Area of skin becomes red, painful, or warm to the touch    A wound increases in pain, becomes warm, drains pus, or radiates red streaks    Fever  of 100.4 F (38 C) or higher, or as directed by your healthcare provider    3275-7371 The "[employer]. [address] [place] [address], [place] [address]. All rights reserved. This information is not intended as a substitute for professional medical care. Always follow your healthcare professional's instructions.        Managing Lymphedema After Cancer  Lymphedema is a problem that may occur after cancer surgery when lymph nodes are removed, or after radiation to the lymph nodes. Lymphedema can occur months or even many years after cancer treatment. It is a chronic (ongoing) condition that has no cure. But steps can be taken to reduce or relieve symptoms. If left untreated, lymphedema can get worse. Treatment can lower your risk of infections and complications.  Understanding lymphedema    The lymphatic system helps the body fight infection. It is made up of a system of small vessels throughout the body. Lymph fluid travels through these vessels. Many tiny organs called lymph nodes are scattered through the system. These nodes filter lymph fluid. During surgery for cancer, nearby lymph nodes are often removed. Sometimes radiation is used to treat lymph nodes as part of cancer treatment. Both of these disrupt the flow of lymph fluid, which can lead to swelling. This is lymphedema. Lymphedema can affect one or both arms or legs, the genitals, or the belly, depending on the part of the body treated. Swelling can worsen and become severe. Skin sores or other problems can develop. Affected areas are also more likely to become infected.  Lymphedema treatment  There are no medications currently used to treat lymphedema. Instead, the most common treatment for lymphedema is complete decongestive therapy (CDT). This is a set of techniques used together to reduce your symptoms. CDT is done by trained therapists. To help show how well the treatment is working, your arms or legs may be measured before and after CDT. The  treatment most often involves one or more of the following:    Manual lymphatic drainage. This is a kind of massage that uses gentle pressure to help move lymph out of areas where it is collecting. It is done by a therapist or nurse with special training. It can also be learned and done at home.    Intermittent pneumatic compression. This uses a device to apply and relieve pressure to the arms or legs. Sleeves are put over the arms or legs. A pump fills the sleeves with air. Then the air is let out. This happens many times in a row.    Compression bandages. This means wearing stretchy or padded fabric on the parts of the body with lymphedema. This may include bandages, tape, or other types of compression wraps. They help support your tissues so lymph can flow more freely. And they help prevent fluid lymph from building up.    Therapeutic exercises. Some kinds of exercise may help your symptoms. These may include aerobic exercise, such as brisk walking. And may also include gradual weight-lifting exercises that build muscle.    Skin and nail care. Proper care of your skin and nails will help prevent infection. See the  Preventing Infection for Life  box for more information.    Compression garments. These are worn as often as needed, for life. These include sleeves, gloves, stockings, undershirt, or other types of special clothes. They compress parts of the body to help prevent lymph buildup. You may wear these during daily life, or at night when you re asleep.     Tips for living with lymphedema    Avoid becoming too cold or too hot. This can cause the skin to swell and dry out. It can also cause more fluid to build up. Be careful around hot objects to avoid burns. Don t use hot tubs, saunas, or a heating pad.    Avoid anything that squeezes the affected area. This may cause more swelling. Wear loose clothing and jewelry. If your legs are affected, don t wear tight socks or undergarments, and don't  cross your legs  when you sit. This can block lymph drainage.    Avoid weight gain. This can make your symptoms worse.    Inform health care providers. If your arms are affected, tell your health care providers about your lymphedema before getting shots, an IV, or having your blood pressure taken.    Call the doctor right awayt if you have    Fever of 100.4 F (38 C) or higher, or as directed by your healthcare provider    Signs of infection such as red blotches, warmth, or pain    Sudden increase in swelling   Preventing infection for life  An important part of staying healthy with lymphedema is preventing infections in the areas that are swollen. Lymphedema makes it easier for bacteria to grow in those areas. To help prevent infection:    Keep your skin clean, and moisturize it with lotion    Be extra careful when shaving, and use a clean razor on clean skin    Check your skin regularly for cuts, sores, bug bites, or other problems    Use an antibacterial ointment if you have a cut or sore    Don't pick at, or cut the skin around your fingernails. Use a cuticle stick to push your cuticles back.    Trim your fingernails and toenails straight across to prevent ingrown nails    If at all possible, don't allow blood to be taken or shots given in any affected limb    Prevent skin burns by wearing sunscreen and using gloves when cooking or doing household work    Wear shoes that fit well and do not cause blisters    Use an insect repellent to avoid bug bites when outdoors.        [id number] [employer]. [address] [place] [address]. All rights reserved. This information is not intended as a substitute for professional medical care. Always follow your healthcare professional's instructions.        Venous Leg Ulcer  Arteries carry oxygenated blood from your heart to the rest of your body. Veins return the blood to the heart.  When you sit or stand, blood in the veins in your legs must flow  uphill  to your  heart. When you move your legs while walking, the contraction of your leg muscles has a pumping effect on the blood in the veins. The veins have one-way valves that stop the blood from flowing backward.  If you have poor blood flow in your veins (venous insufficiency), the one-way valves are damaged. The blood doesn t flow uphill very well. This raises pressure in the veins, and the tissues in your legs don t get enough oxygen. As the problem gets worse, an area of skin near the ankle turns dark and an ulcer forms. This is called a venous stasis ulcer. These ulcers usually don t hurt unless they become infected.  Venous stasis ulcers are treated with compression wraps, antibiotics that you put on your skin, and special dressings. Sometimes you may need a skin graft. Once the ulcer has healed, you will need to use compression stocking to help the pumping action in your veins. The stockings will also reduce swelling.  Home care  Follow these tips when caring for yourself at home:    Use any special compression dressings or stockings as directed.    If you were told to change your dressing, follow the instructions your health care provider gave you. Many different kinds of dressings can be used for this problem, and each is used differently.    Walk regularly. This helps the blood flow better in your legs.    Maintain a healthy weight. If you are overweight, talk with your provider about a weight loss program.    If you smoke, quit smoking. This will ease your symptoms and lower the chance that the disease will get worse. Join a stop-smoking program or ask your provider for help in quitting.    Check your feet and legs for skin breaks or color changes. Report these to your provider. This could be an early sign of an ulcer.    Wear comfortable, well-fitting shoes and socks. Don t use socks that are tight. If they leave a dent in your leg by the end of the day, they are too tight.    When standing, shift your weight from  one leg to the other.    When sitting for long periods, put your feet up. Move your feet and ankles often to get your calf muscles moving. Get up and walk from time to time.  Follow-up care  Follow up with your health care provider.  When to seek medical advice  Call your health care provider right away if any of these occur:    Pus or discharge coming from the ulcer    Pain in or around the ulcer    Redness or swelling of your leg around the ulcer    Fever of 100.4 F (38 C) or higher, or as directed by your health care provider    Shortness of breath or painful breathing    Chest pain, repeated cough, or coughing up blood       8410-2662 [employer]. [address] [place] [address], [place] [address]. All rights reserved. This information is not intended as a substitute for professional medical care. Always follow your healthcare professional's instructions.

## 2017-04-05 NOTE — TELEPHONE ENCOUNTER
Reason for Call:  Form, our goal is to have forms completed with 72 hours, however, some forms may require a visit or additional information.    Type of letter, form or note:  medical    Who is the form from?: [facility]    Where did the form come from: form was faxed in    What clinic location was the form placed at?: [place]    Where the form was placed: [facility]         Call taken on 4/5/2017 at 11:35 AM by Vanessa Myers

## 2017-04-05 NOTE — MR AVS SNAPSHOT
After Visit Summary   4/5/2017    Melania Alicea    MRN: [id number]           Patient Information     Date Of Birth          1945        Visit Information        Provider Department      4/5/2017 1:20 PM Betty Arenas APRN [facility]        Today's Diagnoses     Venous (peripheral) insufficiency    -  1    Lymphedema of both lower extremities          Care Instructions    Lymphedema clinic referral   Adrienne boot for 2 more weeks.   Ok to take off to shower before GYN appt and have put on by RN next day.   Ok to take Unna boot off before your RN visit next to shower and have RN replace dressing.           Understanding Chronic Venous Insufficiency  Problems with the veins in the legs may lead to chronic venous insufficiency (CVI). CVI means that there is a long-term problem with the veins not working well. Because of it, blood stays in the legs and causes swelling.   Two problems that may lead to chronic venous insufficiency are:    Damaged valves. Valves keep blood flowing from the legs through the blood vessels and back to the heart. When the valves are damaged, blood does not flow as well.     Deep vein thrombosis (DVT). Blood clots may form in the deep veins of the legs. This may cause pain, redness, and swelling in the legs. It may also block the flow of blood back to the heart. Call your health care provider if you have these symptoms.    A blood clot in the leg can also break off and travel to the lungs. This is called pulmonary embolism (PE). In the lungs, the clot can cut off the flow of blood. This may cause chest pain, trouble breathing, sweating, a fast heartbeat, coughing (may cough up blood), and fainting. It is a medical emergency and may cause death. Call 911 if you have these symptoms.    Health care providers call the 2 conditions, DVT and PE, venous thromboembolism (VTE).  CVI can t be cured, but you can control leg swelling to reduce the likelihood of  ulcers (sores).  Recognizing the symptoms    If you stand or sit with your feet down for long periods, your legs may ache or feel heavy.    Swollen ankles are possibly the most common symptom of CVI.    As swelling increases, the skin over your ankles may show red spots or a brownish tinge. The skin may feel leathery or scaly, and may start to itch.    If swelling is not controlled, an ulcer (open wound) may form.  What you can do  Reduce your risk of developing ulcers by doing the following:    Increase blood flow back to your heart by elevating your legs, exercising daily, and wearing elastic stockings.    Boost blood flow in your legs by losing excess weight.    If you must stand or sit in 1 place for a period of time, keep your blood moving by wiggling your toes, shifting your body position, and rising up on the balls of your feet.      0875-2957 [employer]. [address] [place] [address]. All rights reserved. This information is not intended as a substitute for professional medical care. Always follow your healthcare professional's instructions.        Leg Swelling (1 Leg Only)  Swelling of the arms, feet, ankles, and legs is called edema. It is caused by extra fluid collecting in the tissues. Because of gravity, extra fluid in the body settles to the lowest part. That is why the legs and feet are most affected. You have swelling in only 1 leg.  Some of the causes for swelling in only one leg include:    Infection in the foot or leg    Long-term problem with a vein not working well (venous insufficiency)    Swollen, twisted vein in the leg (varicose veins)    Insect bite or sting on the foot or leg    Injury or recent surgery on the foot or leg    Blood clot in a deep vein of the leg (deep vein thrombosis or DVT)  Medical treatment will depend on what is causing your swelling.  Home care  Follow these guidelines when caring for yourself at home:    Don t wear tight clothing.    Keep  your legs up while lying or sitting.    Take any medicines as directed.    If infection, injury, or recent surgery is the cause of your swelling, stay off your legs as much as possible until your symptoms get better.    If you have venous insufficiency or varicose veins, don t sit or  one place for long periods of time. Take breaks and walk around every few hours. Talk with your healthcare provider about wearing support stockings to help lessen swelling during the day.  Follow-up care  Follow up with your health care provider as advised.  Call 911  Call 911 if any of these occur:    Shortness of breath or trouble breathing    Chest pain    Coughing up blood    Fainting or loss of consciousness   When to seek medical advice  Call your healthcare provider right away if any of these occur:    Increased pain, swelling, warmth, or redness of the leg, ankle, or foot    Fever of 100.4 F (38 C) or higher, or as directed by your healthcare provider    Weakness or dizziness    7515-6608 [employer]. [address] [place] [address]. All rights reserved. This information is not intended as a substitute for professional medical care. Always follow your healthcare professional's instructions.        Lymphedema  The lymphatic system is made up of lymph vessels and lymph nodes, which carry a fluid called lymph. Lymph consists of waste from the cells. This fluid drains through lymph vessels under the skin to nearby lymph nodes. Lymph nodes filter waste products from the cells and kill any bacteria present before returning the lymph fluid to your blood circulation.  When the lymph vessels are damaged, lymph fluid cannot drain from tissues. This causes the lymph fluid to back up leading to swelling. This most often affects the arms or legs. Signs of lymphedema include heaviness, stiffness, or aching in an arm or leg. The limb may swell. The skin might look red. Shoes and rings may feel tight. Ankles and  wrists might become less flexible.  The most common cause of damage to the lymph system is surgery or radiation for breast or testicular cancer. Other causes include repeated skin infections (cellulitis), burns, or injury to the arms or legs. It can take many years for symptoms of lymphedema to appear. Once present, lymphedema can become a chronic condition. This means the problem can be managed but not cured.   Treatment often includes use of compression garments, massage, and special exercises. Talk to your healthcare provider about these therapies and best treatment plan for you.  Home care  You can help keep the condition from getting worse. Follow all instructions you have been given. Do your exercises and wear your compression garments as recommended. Also, care for yourself as instructed.     Small skin injuries like a cut, burn, or insect bite are more likely to cause a skin infection. Take special care to avoid injury. If you have any signs of infection, call your healthcare provider right away.    Take care of your skin and nails. Moisturize dry skin. Wear protective gloves when doing chores such as gardening.     Don't wear tight clothing or jewelry on the affected arm or leg. Avoid carrying bags or other weight on the affected arm.    Save with an electric razor instead of a razor blade.    If at all possible, don t have blood pressure taken, get injections, or have blood drawn in the affected arm.    If a leg is involved, don t cross your legs when sitting. Don't go barefoot.    Avoid hot tubs, steam rooms, and saunas.  If you are at risk for lymphedema but have not developed it, these tips can help also help prevent it. Follow your healthcare provider's instructions.  Follow-up care  Follow up with your healthcare provider or as advised by our staff.  Lymphedema can change the appearance of your body. This can be emotionally difficult to adjust to. You may benefit from a support group where practical  advice and emotional support is offered. Individual counseling is another option.  When to seek medical attention  Call your healthcare provider if any of the following occur:    Swelling worsens    Rash, blistering, or other skin changes on the affected limb    Area of skin becomes red, painful, or warm to the touch    A wound increases in pain, becomes warm, drains pus, or radiates red streaks    Fever of 100.4 F (38 C) or higher, or as directed by your healthcare provider    0092-9337 [employer]. [address] [place] [address]. All rights reserved. This information is not intended as a substitute for professional medical care. Always follow your healthcare professional's instructions.        Managing Lymphedema After Cancer  Lymphedema is a problem that may occur after cancer surgery when lymph nodes are removed, or after radiation to the lymph nodes. Lymphedema can occur months or even many years after cancer treatment. It is a chronic (ongoing) condition that has no cure. But steps can be taken to reduce or relieve symptoms. If left untreated, lymphedema can get worse. Treatment can lower your risk of infections and complications.  Understanding lymphedema    The lymphatic system helps the body fight infection. It is made up of a system of small vessels throughout the body. Lymph fluid travels through these vessels. Many tiny organs called lymph nodes are scattered through the system. These nodes filter lymph fluid. During surgery for cancer, nearby lymph nodes are often removed. Sometimes radiation is used to treat lymph nodes as part of cancer treatment. Both of these disrupt the flow of lymph fluid, which can lead to swelling. This is lymphedema. Lymphedema can affect one or both arms or legs, the genitals, or the belly, depending on the part of the body treated. Swelling can worsen and become severe. Skin sores or other problems can develop. Affected areas are also more likely to  become infected.  Lymphedema treatment  There are no medications currently used to treat lymphedema. Instead, the most common treatment for lymphedema is complete decongestive therapy (CDT). This is a set of techniques used together to reduce your symptoms. CDT is done by trained therapists. To help show how well the treatment is working, your arms or legs may be measured before and after CDT. The treatment most often involves one or more of the following:    Manual lymphatic drainage. This is a kind of massage that uses gentle pressure to help move lymph out of areas where it is collecting. It is done by a therapist or nurse with special training. It can also be learned and done at home.    Intermittent pneumatic compression. This uses a device to apply and relieve pressure to the arms or legs. Sleeves are put over the arms or legs. A pump fills the sleeves with air. Then the air is let out. This happens many times in a row.    Compression bandages. This means wearing stretchy or padded fabric on the parts of the body with lymphedema. This may include bandages, tape, or other types of compression wraps. They help support your tissues so lymph can flow more freely. And they help prevent fluid lymph from building up.    Therapeutic exercises. Some kinds of exercise may help your symptoms. These may include aerobic exercise, such as brisk walking. And may also include gradual weight-lifting exercises that build muscle.    Skin and nail care. Proper care of your skin and nails will help prevent infection. See the  Preventing Infection for Life  box for more information.    Compression garments. These are worn as often as needed, for life. These include sleeves, gloves, stockings, undershirt, or other types of special clothes. They compress parts of the body to help prevent lymph buildup. You may wear these during daily life, or at night when you re asleep.     Tips for living with lymphedema    Avoid becoming too cold  or too hot. This can cause the skin to swell and dry out. It can also cause more fluid to build up. Be careful around hot objects to avoid burns. Don t use hot tubs, saunas, or a heating pad.    Avoid anything that squeezes the affected area. This may cause more swelling. Wear loose clothing and jewelry. If your legs are affected, don t wear tight socks or undergarments, and don't  cross your legs when you sit. This can block lymph drainage.    Avoid weight gain. This can make your symptoms worse.    Inform health care providers. If your arms are affected, tell your health care providers about your lymphedema before getting shots, an IV, or having your blood pressure taken.    Call the doctor right awayt if you have    Fever of 100.4 F (38 C) or higher, or as directed by your healthcare provider    Signs of infection such as red blotches, warmth, or pain    Sudden increase in swelling   Preventing infection for life  An important part of staying healthy with lymphedema is preventing infections in the areas that are swollen. Lymphedema makes it easier for bacteria to grow in those areas. To help prevent infection:    Keep your skin clean, and moisturize it with lotion    Be extra careful when shaving, and use a clean razor on clean skin    Check your skin regularly for cuts, sores, bug bites, or other problems    Use an antibacterial ointment if you have a cut or sore    Don't pick at, or cut the skin around your fingernails. Use a cuticle stick to push your cuticles back.    Trim your fingernails and toenails straight across to prevent ingrown nails    If at all possible, don't allow blood to be taken or shots given in any affected limb    Prevent skin burns by wearing sunscreen and using gloves when cooking or doing household work    Wear shoes that fit well and do not cause blisters    Use an insect repellent to avoid bug bites when outdoors.        9728-8867 The [employer]. [address] [facility],  [place] [address]. All rights reserved. This information is not intended as a substitute for professional medical care. Always follow your healthcare professional's instructions.        Venous Leg Ulcer  Arteries carry oxygenated blood from your heart to the rest of your body. Veins return the blood to the heart.  When you sit or stand, blood in the veins in your legs must flow  uphill  to your heart. When you move your legs while walking, the contraction of your leg muscles has a pumping effect on the blood in the veins. The veins have one-way valves that stop the blood from flowing backward.  If you have poor blood flow in your veins (venous insufficiency), the one-way valves are damaged. The blood doesn t flow uphill very well. This raises pressure in the veins, and the tissues in your legs don t get enough oxygen. As the problem gets worse, an area of skin near the ankle turns dark and an ulcer forms. This is called a venous stasis ulcer. These ulcers usually don t hurt unless they become infected.  Venous stasis ulcers are treated with compression wraps, antibiotics that you put on your skin, and special dressings. Sometimes you may need a skin graft. Once the ulcer has healed, you will need to use compression stocking to help the pumping action in your veins. The stockings will also reduce swelling.  Home care  Follow these tips when caring for yourself at home:    Use any special compression dressings or stockings as directed.    If you were told to change your dressing, follow the instructions your health care provider gave you. Many different kinds of dressings can be used for this problem, and each is used differently.    Walk regularly. This helps the blood flow better in your legs.    Maintain a healthy weight. If you are overweight, talk with your provider about a weight loss program.    If you smoke, quit smoking. This will ease your symptoms and lower the chance that the disease will get worse. Join a  stop-smoking program or ask your provider for help in quitting.    Check your feet and legs for skin breaks or color changes. Report these to your provider. This could be an early sign of an ulcer.    Wear comfortable, well-fitting shoes and socks. Don t use socks that are tight. If they leave a dent in your leg by the end of the day, they are too tight.    When standing, shift your weight from one leg to the other.    When sitting for long periods, put your feet up. Move your feet and ankles often to get your calf muscles moving. Get up and walk from time to time.  Follow-up care  Follow up with your health care provider.  When to seek medical advice  Call your health care provider right away if any of these occur:    Pus or discharge coming from the ulcer    Pain in or around the ulcer    Redness or swelling of your leg around the ulcer    Fever of 100.4 F (38 C) or higher, or as directed by your health care provider    Shortness of breath or painful breathing    Chest pain, repeated cough, or coughing up blood       3479-4734 [employer]. [address] [place] [address]. All rights reserved. This information is not intended as a substitute for professional medical care. Always follow your healthcare professional's instructions.              Follow-ups after your visit        Additional Services     PHYSICAL THERAPY REFERRAL       *This order will print in the [facility] Central Scheduling Office*    [facility] provides Physical Therapy evaluation and treatment and many specialty services across the [facility] system.  If requesting a specialty program, please choose from the list below.    Call one number to schedule at any [facility] location   [phone].    Treatment: Evaluation & Treatment  Special Instructions/Modalities:   Special Programs: [facility] LYMPHEDEMA     Please be aware that coverage of these  services is subject to the terms and limitations of your health insurance plan.  Call member services at your health plan with any benefit or coverage questions.      **Note to Provider** To refer patients to therapy outside of the location list, change the order class to External Referral in the order composer.                  Your next 10 appointments already scheduled     Apr 10, 2017 11:00 AM CDT   SHORT with Kristi Merchant MD   [facility] ([facility])    [address] [facility] [id number] [phone]              Who to contact     If you have questions or need follow up information about today's clinic visit or your schedule please contact [facility] directly at [phone].  Normal or non-critical lab and imaging results will be communicated to you by MyChart, letter or phone within 4 business days after the clinic has received the results. If you do not hear from us within 7 days, please contact the clinic through MyChart or phone. If you have a critical or abnormal lab result, we will notify you by phone as soon as possible.  Submit refill requests through [employer] or call your pharmacy and they will forward the refill request to us. Please allow 3 business days for your refill to be completed.          Additional Information About Your Visit        [employer] Information     [employer] gives you secure access to your electronic health record. If you see a primary care provider, you can also send messages to your care team and make appointments. If you have questions, please call your primary care clinic.  If you do not have a primary care provider, please call [phone] and they will assist you.        Care EveryWhere ID     This is your Care EveryWhere ID. This could be used by other organizations to access your [place] medical records  [id number]        Your Vitals Were     Pulse Temperature Pulse Oximetry BMI (Body Mass Index)           77 99.6  F (37.6  C) 96% 42.22 kg/m2         Blood Pressure from Last 3 Encounters:   04/05/17 110/70   03/29/17 122/80   03/15/17 146/76    Weight from Last 3 Encounters:   04/05/17 273 lb 9.6 oz (124.1 kg)   03/29/17 274 lb (124.3 kg)   03/15/17 273 lb (123.8 kg)              We Performed the Following     PHYSICAL THERAPY REFERRAL        Primary Care Provider Office Phone # Fax #    JOANN [facility] [phone]       [facility] [address] [facility] [address]        Thank you!     Thank you for choosing [facility]  for your care. Our goal is always to provide you with excellent care. Hearing back from our patients is one way we can continue to improve our services. Please take a few minutes to complete the written survey that you may receive in the mail after your visit with us. Thank you!             Your Updated Medication List - Protect others around you: Learn how to safely use, store and throw away your medicines at www.disposemymeds.org.          This list is accurate as of: 4/5/17  2:08 PM.  Always use your most recent med list.                   Brand Name Dispense Instructions for use    acetaminophen-codeine 300-30 MG per tablet    TYLENOL #3    18 tablet    Take 1 tablet by mouth every 6 hours as needed for pain maximum 3 tablet(s) per day       ADVIL 200 MG capsule   Generic drug:  ibuprofen      ONE TO TWO TABLETS EVERY 6-8 HOURS AS NEEDED       cephALEXin 500 MG capsule    KEFLEX    30 capsule    Take 1 capsule (500 mg) by mouth 3 times daily       estradiol 0.5 MG tablet    ESTRACE    24 tablet    Place vaginally twice a week       [facility] WOUND/BURN DRESSING EX          triamcinolone 0.5 % Oint ointment    KENALOG    15 g    Thin layer to affected area BID prn itching redness       TRIDERMA FORTE Crea          VITAMIN D (CHOLECALCIFEROL) PO      Take 1,000 Units by mouth daily

## 2017-04-05 NOTE — PROGRESS NOTES
SUBJECTIVE:                                                    Melania Alicea is a 71 year old female who presents to clinic today for the following health issues:    Edema in the right leg, feels like it is getting better per patient report. Patient seeing wound care RN 2x per week. Wound nurse believes she is getting edema in her left leg also.  She is homebound. She's not been able to leave the house this winter except for to doctor's appointments due to ongoing lower extremity swelling and pain. She does ambulate with a cane. She has a history of Charcot-Emilee-Tooth muscular atrophy  Patient finished up 2 courses of Keflex. The redness and swelling is markedly improved with the use of the Unna boot. Open sores have resolved. No problems with diarrhea at this time. Upcoming appointment next week with GYN for pessary maintenance  Her home care RN has requested a consultation with the lymphedema clinic patient would like to see them in [place]. Unna  boot is being changed twice a week. Patient is requesting our RN staff Anabell here to place this today as she doesn't have any pain after. Occasionally has ankle pain after home health nurse places Unna boot per her report. She has not been using medihoney as she is does not have any open sores at this time.    -------------------------------------    Problem list and histories reviewed & adjusted, as indicated.  Additional history: as documented    Labs reviewed in EPIC    Reviewed and updated as needed this visit by clinical staff  Tobacco  Allergies  Med Hx  Surg Hx  Fam Hx  Soc Hx      Reviewed and updated as needed this visit by Provider         ROS:   ROS: 10 point ROS neg other than the symptoms noted above in the HPI.    OBJECTIVE:                                                    /70 (BP Location: Right arm, Patient Position: Chair, Cuff Size: Adult Large)  Pulse 77  Temp 99.6  F (37.6  C)  Wt 273 lb 9.6 oz (124.1 kg)  SpO2 96%  BMI 42.22  kg/m2  Body mass index is 42.22 kg/(m^2).   Wt Readings from Last 4 Encounters:   04/05/17 273 lb 9.6 oz (124.1 kg)   03/29/17 274 lb (124.3 kg)   03/15/17 273 lb (123.8 kg)   01/09/17 270 lb (122.5 kg)     GENERAL: healthy, alert, well nourished, well hydrated, no distress  HENT: ear canals- normal; TMs- normal; Nose- normal; Mouth- no ulcers, no lesions  NECK: no tenderness, no adenopathy, no asymmetry, no masses, no stiffness; thyroid- normal to palpation  RESP: lungs clear to auscultation - no rales, no rhonchi, no wheezes  CV: regular rates and rhythm, normal S1 S2, no S3 or S4 and no murmur, no click or rub -  ABDOMEN: soft, no tenderness, no  hepatosplenomegaly, no masses, normal bowel sounds  MS: extremities- right lower extremity edema and erythema improved with the use of the Unna boot.  Varicose veins and venous insufficiency skin changes noted to the lower extremities right more so than left. She no longer has any open sores. no gross deformities noted, 1+ pitting edema to the left lower extremity as well    Diagnostic test results:  Results for orders placed or performed in visit on 04/03/17   US Lower Extremity Venous Duplex Right    Narrative    US LOWER EXTREMITY VENOUS DUPLEX RIGHT  4/3/2017 2:31 PM     HISTORY:  Pain and swelling in the right leg.    COMPARISON: None.    FINDINGS: Color Doppler and spectral waveform analysis performed.    No evidence for DVT in the right lower extremity.    VAISHNAVI CONRAD MD        ASSESSMENT/PLAN:                                                    1. Venous (peripheral) insufficiency  Lymphedema clinic referral placed  - PHYSICAL THERAPY REFERRAL    2. Lymphedema of both lower extremities  - PHYSICAL THERAPY REFERRAL    No further antibiotics needed at this time  Complete course of Keflex  Continue Unna boot for the next 2 weeks    Lymphedema clinic referral   Adrienne boot for 2 more weeks.   Ok to take off to shower before GYN appt and have put on by RN next day.    Ok to take Unna boot off before your RN visit next to shower and have RN replace dressing.     Follow up with Provider - 1 month  Call or return to the clinic with any worsening of symptoms or no resolution. Patient/Parent verbalized understanding and is in agreement. Medication side effects reviewed.   Current Outpatient Prescriptions   Medication Sig Dispense Refill     Wound Dressings ([facility] WOUND/BURN DRESSING EX)        Calcitriol-Fluticas-Tarcrolim (TRIDERMA FORTE) CREA        cephALEXin (KEFLEX) 500 MG capsule Take 1 capsule (500 mg) by mouth 3 times daily 30 capsule 0     acetaminophen-codeine (TYLENOL #3) 300-30 MG per tablet Take 1 tablet by mouth every 6 hours as needed for pain maximum 3 tablet(s) per day 18 tablet 0     triamcinolone (KENALOG) 0.5 % OINT ointment Thin layer to affected area BID prn itching redness 15 g 2     estradiol (ESTRACE) 0.5 MG tablet Place vaginally twice a week 24 tablet 3     VITAMIN D, CHOLECALCIFEROL, PO Take 1,000 Units by mouth daily       ADVIL 200 MG PO CAPS ONE TO TWO TABLETS EVERY 6-8 HOURS AS NEEDED  0        See Patient Instructions    JOANN Dorsey CNP  [facility]

## 2017-04-05 NOTE — NURSING NOTE
"Chief Complaint   Patient presents with     Leg Swelling     right leg        Initial There were no vitals taken for this visit. Estimated body mass index is 42.28 kg/(m^2) as calculated from the following:    Height as of 1/9/17: 5' 7.5\" (1.715 m).    Weight as of 3/29/17: 274 lb (124.3 kg).  Medication Reconciliation: complete    "

## 2017-04-07 NOTE — TELEPHONE ENCOUNTER
Form received back signed  4/6/17    Faxed Back & Sent to be scanned to this encounter  Annabelle Orn Station Sec

## 2017-04-10 ENCOUNTER — OFFICE VISIT (OUTPATIENT)
Dept: OBGYN | Facility: CLINIC | Age: 72
End: 2017-04-10
Payer: COMMERCIAL

## 2017-04-10 VITALS
HEART RATE: 89 BPM | DIASTOLIC BLOOD PRESSURE: 87 MMHG | SYSTOLIC BLOOD PRESSURE: 149 MMHG | HEIGHT: 68 IN | WEIGHT: 270 LBS | BODY MASS INDEX: 40.92 KG/M2

## 2017-04-10 DIAGNOSIS — Z46.89 PESSARY MAINTENANCE: Primary | ICD-10-CM

## 2017-04-10 PROCEDURE — 99212 OFFICE O/P EST SF 10 MIN: CPT | Performed by: OBSTETRICS & GYNECOLOGY

## 2017-04-10 NOTE — MR AVS SNAPSHOT
After Visit Summary   4/10/2017    Melania Alicea    MRN: [id number]           Patient Information     Date Of Birth          1945        Visit Information        Provider Department      4/10/2017 11:00 AM Kristi Merchant MD [facility]        Today's Diagnoses     Pessary maintenance    -  1       Follow-ups after your visit        Your next 10 appointments already scheduled     Apr 13, 2017  9:45 AM CDT   [facility]ymphedema Evaluation with [facility]ie Sellers PT   [facility]    [address] [id number] [phone]              Who to contact     If you have questions or need follow up information about today's clinic visit or your schedule please contact [facility] directly at [phone].  Normal or non-critical lab and imaging results will be communicated to you by MyChart, letter or phone within 4 business days after the clinic has received the results. If you do not hear from us within 7 days, please contact the clinic through MyChart or phone. If you have a critical or abnormal lab result, we will notify you by phone as soon as possible.  Submit refill requests through [employer] or call your pharmacy and they will forward the refill request to us. Please allow 3 business days for your refill to be completed.          Additional Information About Your Visit        MyChart Information     [employer] gives you secure access to your electronic health record. If you see a primary care provider, you can also send messages to your care team and make appointments. If you have questions, please call your primary care clinic.  If you do not have a primary care provider, please call [phone] and they will assist you.        Care EveryWhere ID     This is your Care EveryWhere ID. This could be used by other organizations to access your [facility] medical records  [id number]        Your Vitals Were      "Pulse Height BMI (Body Mass Index)             89 5' 7.5\" (1.715 m) 41.66 kg/m2          Blood Pressure from Last 3 Encounters:   04/10/17 149/87   04/05/17 110/70   03/29/17 122/80    Weight from Last 3 Encounters:   04/10/17 270 lb (122.5 kg)   04/05/17 273 lb 9.6 oz (124.1 kg)   03/29/17 274 lb (124.3 kg)              Today, you had the following     No orders found for display       Primary Care Provider Office Phone # Fax #    Betty Hebert JOANN Arenas [facility] [phone]       [facility] [address] [facility] [address]        Thank you!     Thank you for choosing [facility]  for your care. Our goal is always to provide you with excellent care. Hearing back from our patients is one way we can continue to improve our services. Please take a few minutes to complete the written survey that you may receive in the mail after your visit with us. Thank you!             Your Updated Medication List - Protect others around you: Learn how to safely use, store and throw away your medicines at www.disposemymeds.org.          This list is accurate as of: 4/10/17 12:33 PM.  Always use your most recent med list.                   Brand Name Dispense Instructions for use    acetaminophen-codeine 300-30 MG per tablet    TYLENOL #3    18 tablet    Take 1 tablet by mouth every 6 hours as needed for pain maximum 3 tablet(s) per day       ADVIL 200 MG capsule   Generic drug:  ibuprofen      ONE TO TWO TABLETS EVERY 6-8 HOURS AS NEEDED       cephALEXin 500 MG capsule    KEFLEX    30 capsule    Take 1 capsule (500 mg) by mouth 3 times daily       estradiol 0.5 MG tablet    ESTRACE    24 tablet    Place vaginally twice a week       [facility] WOUND/BURN DRESSING EX      Reported on 4/10/2017       triamcinolone 0.5 % Oint ointment    KENALOG    15 g    Thin layer to affected area BID prn itching redness       TRIDERMA FORTE Crea          VITAMIN D (CHOLECALCIFEROL) PO      Take 1,000 Units by " mouth daily

## 2017-04-10 NOTE — PROGRESS NOTES
Melania is a 71 year old [id number] female who presents for pessary recheck;  she currently has a gellhorn pessary which she keeps in place ;  she reports no problems with her pessary, no discharge, no bleeding.. She is using vaginal estrogen once a week.     ROS: 10 point ROS neg other than the symptoms noted above in the HPI.  Past Medical History:   Diagnosis Date     Charcot Emilee Tooth muscular atrophy 2010    Symptoms began at age 48. Difficulty with stairs, getting up from chairs, weakness in LE's but more so in the left.       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 Cunningham, [place], [facility]       Current Outpatient Prescriptions   Medication Sig Dispense Refill     Wound Dressings ([facility] WOUND/BURN DRESSING EX) Reported on 4/10/2017       Calcitriol-Fluticas-Tarcrolim (TRIDERMA FORTE) CREA        cephALEXin (KEFLEX) 500 MG capsule Take 1 capsule (500 mg) by mouth 3 times daily 30 capsule 0     triamcinolone (KENALOG) 0.5 % OINT ointment Thin layer to affected area BID prn itching redness 15 g 2     estradiol (ESTRACE) 0.5 MG tablet Place vaginally twice a week 24 tablet 3     VITAMIN D, CHOLECALCIFEROL, PO Take 1,000 Units by mouth daily       ADVIL 200 MG PO CAPS ONE TO TWO TABLETS EVERY 6-8 HOURS AS NEEDED  0     acetaminophen-codeine (TYLENOL #3) 300-30 MG per tablet Take 1 tablet by mouth every 6 hours as needed for pain maximum 3 tablet(s) per day (Patient not taking: Reported on 4/10/2017) 18 tablet 0  "      Social History     Social History     Marital status: Single     Spouse name: N/A     Number of children: N/A     Years of education: N/A     Social History Main Topics     Smoking status: Never Smoker     Smokeless tobacco: Never Used     Alcohol use No     Drug use: No     Sexual activity: No     Other Topics Concern     None     Social History Narrative       Family History   Problem Relation Age of Onset     Hypertension Mother      Musculoskeletal Disorder Mother      CMT- Charcot Emilee Tooth     CEREBROVASCULAR DISEASE Father      Respiratory Brother      Emphysema     Musculoskeletal Disorder Daughter      CMT- Charcot Emilee Tooth       OBJECTIVE: /87  Pulse 89  Ht 5' 7.5\" (1.715 m)  Wt 270 lb (122.5 kg)  BMI 41.66 kg/m2  No LMP recorded. Patient is postmenopausal.  The pessary was removed withno difficulty, cleaned in warm water and  replaced, after inspection of the vulva and vagina, which showed normal vaginal and cervical tissue. No lesions.    ASSESSMENT / PLAN:  (Z46.89) Pessary maintenance  (primary encounter diagnosis)  Comment: cleansed and replaced  Plan: f/u in 3 months  Educated on precautions and use  Kristi Merchant MD        "

## 2017-04-10 NOTE — NURSING NOTE
"Chief Complaint   Patient presents with     Pessary Check/Fit/Insert       Initial /87  Pulse 89  Ht 5' 7.5\" (1.715 m)  Wt 270 lb (122.5 kg)  BMI 41.66 kg/m2 Estimated body mass index is 41.66 kg/(m^2) as calculated from the following:    Height as of this encounter: 5' 7.5\" (1.715 m).    Weight as of this encounter: 270 lb (122.5 kg).  Medication Reconciliation: complete     Lea Cartwright LPN      "

## 2017-04-11 ENCOUNTER — TELEPHONE (OUTPATIENT)
Dept: CARE COORDINATION | Facility: CLINIC | Age: 72
End: 2017-04-11

## 2017-04-11 NOTE — TELEPHONE ENCOUNTER
DC from home care    Discharge Summary    Patient DC with goals met, was seen by SN s/p cellulitis and edema  Patient will be seen by lymphedema outpatient on 4/13/17  Patients cellulits has resolved, she received 2 treatments of keflex  and tried to use unna boot and this was ok for her, she would remove it after about a couple days, she was referred to lymphedema   Patient has chronic R LE pain due to charcot indu tooth and can range with sharp intermittent pain 7/10 and best at rest 2/10, she takes advil occasionally for good relief  Instructed patient on safe medication administration, instructed on falls prevention and she has remained free from falls during episode. Instructed on s/sx of infx and cellulitis and when to see MD. Instructed on abx and can cause c/diff, patient has hx of c/diff previously,  instructed on using yogurt or adding good bacteria into gut.  She is good about following up with MD. Instructed on pain management and elevating her LEs for edema control, she doesnt like wearing compression.   Patient verbalized understanding of all teaching using teachback   Follow up with MD as needed    Pia Weston RN  [facility]   [phone]

## 2017-04-13 ENCOUNTER — HOSPITAL ENCOUNTER (OUTPATIENT)
Dept: PHYSICAL THERAPY | Facility: CLINIC | Age: 72
Setting detail: THERAPIES SERIES
End: 2017-04-13
Attending: NURSE PRACTITIONER
Payer: MEDICARE

## 2017-04-13 PROCEDURE — G8988 SELF CARE GOAL STATUS: HCPCS | Mod: GP,CI | Performed by: PHYSICAL THERAPIST

## 2017-04-13 PROCEDURE — 97140 MANUAL THERAPY 1/> REGIONS: CPT | Mod: GP | Performed by: PHYSICAL THERAPIST

## 2017-04-13 PROCEDURE — 97162 PT EVAL MOD COMPLEX 30 MIN: CPT | Mod: GP | Performed by: PHYSICAL THERAPIST

## 2017-04-13 PROCEDURE — G8987 SELF CARE CURRENT STATUS: HCPCS | Mod: GP,CJ | Performed by: PHYSICAL THERAPIST

## 2017-04-13 PROCEDURE — 40000449 ZZHC STATISTIC PT VISIT, LYMPHEDEMA: Performed by: PHYSICAL THERAPIST

## 2017-04-13 NOTE — PROGRESS NOTES
[facility]        OUTPATIENT PHYSICAL THERAPY EDEMA EVALUATION  PLAN OF TREATMENT FOR OUTPATIENT REHABILITATION  (COMPLETE FOR INITIAL CLAIMS ONLY)  Patient's Last Name, First Name, Melania Caballero                           Provider s Name:   [facility] Medical Record No.  [id number]     Start of Care Date:  04/13/17   Onset Date:  03/15/17   Type:  PT   Medical Diagnosis:  B LE lymphedema   Therapy Diagnosis:  B LE lymphedmea Visits from SOC:  1                                     __________________________________________________________________________________   Plan of Treatment/Functional Goals:    Manual lymph drainage, Gradient compression bandaging, Fit for compression garment, Manual therapy, Exercises, Education, Precautions to prevent infection / exacerbation, ADL training, Home management program development, Skin care / precautions, Soft tissue mobilization        GOALS  1. Goal description: pt to have around the clock tolerance to RLE GCB for edema reduction response       Target date: 05/04/17  2. Goal description: once appropriate, pt to be independent with donning, doffing and care of compression stocking/garment for longterm edema management for maintenance       Target date: 06/08/17  3. Goal description: pt to be independent with longterm RLE lymphedema management via HEP, elevation, compression garment wear and MLD (when/if appropriate)       Target date: 06/08/17  4. Goal description: pt to have at least 3-5 point improvement on LLIS due to lymphedema reduction response in RLE       Target date: 06/08/17    Treatment frequency: 3 times / week   Treatment duration: 8 weeks    Marcie Sellers, PT                                    I CERTIFY THE NEED FOR THESE SERVICES FURNISHED UNDER        THIS PLAN OF TREATMENT AND WHILE UNDER MY CARE      (Physician co-signature of this document indicates review and certification of the therapy plan).                   Certification date from: 04/13/17       Certification date to: 06/08/17           Referring physician: Betty Arenas CNP   Initial Assessment  See Epic Evaluation- Start of care: 04/13/17

## 2017-04-19 ENCOUNTER — TELEPHONE (OUTPATIENT)
Dept: FAMILY MEDICINE | Facility: CLINIC | Age: 72
End: 2017-04-19

## 2017-04-19 NOTE — TELEPHONE ENCOUNTER
Reason for Call:  Form, our goal is to have forms completed with 72 hours, however, some forms may require a visit or additional information.    Type of letter, form or note:  medical    Who is the form from?: [employer]   Plan of Care    Where did the form come from: form was faxed in    What clinic location was the form placed at?: [place]    Where the form was placed: [facility]          Call taken on 4/19/2017 at 11:48 AM by Vanessa Myers

## 2017-04-20 PROCEDURE — G0180 MD CERTIFICATION HHA PATIENT: HCPCS | Performed by: FAMILY MEDICINE

## 2017-04-20 NOTE — TELEPHONE ENCOUNTER
Form received back signed  4/20/17  Faxed Back & Sent to be scanned to this encounter  Annabelle Orn Station Sec

## 2017-04-21 ENCOUNTER — HOSPITAL ENCOUNTER (OUTPATIENT)
Dept: PHYSICAL THERAPY | Facility: CLINIC | Age: 72
Setting detail: THERAPIES SERIES
End: 2017-04-21
Attending: NURSE PRACTITIONER
Payer: MEDICARE

## 2017-04-21 PROCEDURE — 97140 MANUAL THERAPY 1/> REGIONS: CPT | Mod: GP | Performed by: PHYSICAL THERAPIST

## 2017-04-21 PROCEDURE — 40000449 ZZHC STATISTIC PT VISIT, LYMPHEDEMA: Performed by: PHYSICAL THERAPIST

## 2017-04-24 ENCOUNTER — HOSPITAL ENCOUNTER (OUTPATIENT)
Dept: PHYSICAL THERAPY | Facility: CLINIC | Age: 72
Setting detail: THERAPIES SERIES
End: 2017-04-24
Attending: NURSE PRACTITIONER
Payer: MEDICARE

## 2017-04-24 PROCEDURE — 40000449 ZZHC STATISTIC PT VISIT, LYMPHEDEMA: Performed by: PHYSICAL THERAPIST

## 2017-04-24 PROCEDURE — 97140 MANUAL THERAPY 1/> REGIONS: CPT | Mod: GP | Performed by: PHYSICAL THERAPIST

## 2017-04-28 ENCOUNTER — HOSPITAL ENCOUNTER (OUTPATIENT)
Dept: PHYSICAL THERAPY | Facility: CLINIC | Age: 72
Setting detail: THERAPIES SERIES
End: 2017-04-28
Attending: NURSE PRACTITIONER
Payer: MEDICARE

## 2017-04-28 PROCEDURE — 97140 MANUAL THERAPY 1/> REGIONS: CPT | Mod: GP | Performed by: PHYSICAL THERAPIST

## 2017-04-28 PROCEDURE — 40000718 ZZHC STATISTIC PT DEPARTMENT ORTHO VISIT: Performed by: PHYSICAL THERAPIST

## 2017-05-01 ENCOUNTER — HOSPITAL ENCOUNTER (OUTPATIENT)
Dept: PHYSICAL THERAPY | Facility: CLINIC | Age: 72
Setting detail: THERAPIES SERIES
End: 2017-05-01
Attending: NURSE PRACTITIONER
Payer: MEDICARE

## 2017-05-01 PROCEDURE — 40000718 ZZHC STATISTIC PT DEPARTMENT ORTHO VISIT: Performed by: PHYSICAL THERAPIST

## 2017-05-01 PROCEDURE — 97140 MANUAL THERAPY 1/> REGIONS: CPT | Mod: GP | Performed by: PHYSICAL THERAPIST

## 2017-05-03 ENCOUNTER — HOSPITAL ENCOUNTER (OUTPATIENT)
Dept: PHYSICAL THERAPY | Facility: CLINIC | Age: 72
Setting detail: THERAPIES SERIES
End: 2017-05-03
Attending: NURSE PRACTITIONER
Payer: MEDICARE

## 2017-05-03 PROCEDURE — 40000449 ZZHC STATISTIC PT VISIT, LYMPHEDEMA: Performed by: PHYSICAL THERAPIST

## 2017-05-03 PROCEDURE — 97140 MANUAL THERAPY 1/> REGIONS: CPT | Mod: GP | Performed by: PHYSICAL THERAPIST

## 2017-05-10 ENCOUNTER — HOSPITAL ENCOUNTER (OUTPATIENT)
Dept: PHYSICAL THERAPY | Facility: CLINIC | Age: 72
Setting detail: THERAPIES SERIES
End: 2017-05-10
Attending: NURSE PRACTITIONER
Payer: MEDICARE

## 2017-05-10 PROCEDURE — 40000449 ZZHC STATISTIC PT VISIT, LYMPHEDEMA: Performed by: PHYSICAL THERAPIST

## 2017-05-10 PROCEDURE — 97140 MANUAL THERAPY 1/> REGIONS: CPT | Mod: GP | Performed by: PHYSICAL THERAPIST

## 2017-05-11 ENCOUNTER — OFFICE VISIT (OUTPATIENT)
Dept: FAMILY MEDICINE | Facility: CLINIC | Age: 72
End: 2017-05-11
Payer: COMMERCIAL

## 2017-05-11 VITALS
BODY MASS INDEX: 41.14 KG/M2 | OXYGEN SATURATION: 99 % | SYSTOLIC BLOOD PRESSURE: 132 MMHG | HEART RATE: 72 BPM | DIASTOLIC BLOOD PRESSURE: 80 MMHG | TEMPERATURE: 97.3 F | WEIGHT: 266.6 LBS

## 2017-05-11 DIAGNOSIS — L03.115 CELLULITIS OF RIGHT LOWER EXTREMITY: ICD-10-CM

## 2017-05-11 DIAGNOSIS — I87.2 VENOUS STASIS DERMATITIS OF RIGHT LOWER EXTREMITY: ICD-10-CM

## 2017-05-11 PROCEDURE — 99214 OFFICE O/P EST MOD 30 MIN: CPT | Performed by: NURSE PRACTITIONER

## 2017-05-11 RX ORDER — CEPHALEXIN 500 MG/1
500 CAPSULE ORAL 3 TIMES DAILY
Qty: 30 CAPSULE | Refills: 0 | Status: SHIPPED | OUTPATIENT
Start: 2017-05-11 | End: 2017-05-22

## 2017-05-11 NOTE — PROGRESS NOTES
SUBJECTIVE:                                                    Melania Alicea is a 71 year old female who presents to clinic today for the following health issues:      RASH       Duration: 5 days     Description (location/character/radiation): right lower leg     Intensity:  mild    Accompanying signs and symptoms: swelling and red     History (similar episodes/previous evaluation): cellulitis     Precipitating or alleviating factors: None    Therapies tried and outcome: wrap and luberderm cream     being treated by the lymphedema clinic home health nurse was coming out to help with wound care. Wounds to the right lower extremity resolved. Patient has only been placing lotion on them recently. Over the last 5 days she's noticed more redness swelling and open sore    Patient has a family history of Charcot Emilee tooth muscular atrophy. She is requesting neurology consultation for formal diagnosis of type she has lower extremity weakness and ambulatory again.       History of tubular adenoma to the rectum. Repeat colonoscopy declined. No problems with bleeding or stooling issues           -------------------------------------    Problem list and histories reviewed & adjusted, as indicated.  Additional history: as documented    Labs reviewed in EPIC    Reviewed and updated as needed this visit by clinical staff  Tobacco  Allergies       Reviewed and updated as needed this visit by Provider         ROS:  C: NEGATIVE for fever, chills, change in weight  INTEGUMENTARY/SKIN: As previous  E/M: NEGATIVE for ear, mouth and throat problems  R: NEGATIVE for significant cough or SOB  CV: NEGATIVE for chest pain, palpitations or peripheral edema  MUSCULOSKELETAL: LE weakness and swelling bilaterally right with celluitis  Bilateral venous stasis changes  ROS otherwise negative    OBJECTIVE:                                                    /80  Pulse 72  Temp 97.3  F (36.3  C) (Tympanic)  Wt 266 lb 9.6 oz (120.9 kg)   SpO2 99%  BMI 41.14 kg/m2  Body mass index is 41.14 kg/(m^2).   GENERAL: healthy, alert, well nourished, well hydrated, no distress  HENT: ear canals- normal; TMs- normal; Nose- normal; Mouth- no ulcers, no lesions  NECK: no tenderness, no adenopathy, no asymmetry, no masses, no stiffness; thyroid- normal to palpation  RESP: lungs clear to auscultation - no rales, no rhonchi, no wheezes  CV: regular rates and rhythm, normal S1 S2, no S3 or S4 and no murmur, no click or rub -  ABDOMEN: soft, no tenderness, no  hepatosplenomegaly, no masses, normal bowel sounds  MS: extremities- lower extremity edema bilaterally. 2+. Venous stasis changes noted to the anterior shin bilaterally. Small pea-sized open lesion with    Diagnostic test results:  Results for orders placed or performed in visit on 04/03/17   US Lower Extremity Venous Duplex Right    Narrative    US LOWER EXTREMITY VENOUS DUPLEX RIGHT  4/3/2017 2:31 PM     HISTORY:  Pain and swelling in the right leg.    COMPARISON: None.    FINDINGS: Color Doppler and spectral waveform analysis performed.    No evidence for DVT in the right lower extremity.    VAISHNAVI CONRAD MD        ASSESSMENT/PLAN:                                                    1. Cellulitis of right lower extremity  RESTART  antibioticstoday    - cephALEXin (KEFLEX) 500 MG capsule; Take 1 capsule (500 mg) by mouth 3 times daily  Dispense: 30 capsule; Refill: 0    2. Venous stasis dermatitis of right lower extremity   wrap  - cephALEXin (KEFLEX) 500 MG capsule; Take 1 capsule (500 mg) by mouth 3 times daily  Dispense: 30 capsule; Refill: 0      Follow up with Provider -  Lymphedema clinic as planned  Call or return to the clinic with any worsening of symptoms or no resolution. Patient/Parent verbalized understanding and is in agreement. Medication side effects reviewed.   Current Outpatient Prescriptions   Medication Sig Dispense Refill     cephALEXin (KEFLEX) 500 MG capsule Take 1 capsule (500 mg)  by mouth 3 times daily 30 capsule 0     estradiol (ESTRACE) 0.5 MG tablet Place vaginally twice a week 24 tablet 3     VITAMIN D, CHOLECALCIFEROL, PO Take 1,000 Units by mouth daily       ADVIL 200 MG PO CAPS ONE TO TWO TABLETS EVERY 6-8 HOURS AS NEEDED  0        See Patient Instructions    JOANN Dorsey [facility]

## 2017-05-11 NOTE — NURSING NOTE
"Chief Complaint   Patient presents with     Cellulitis     recheck right leg        Initial There were no vitals taken for this visit. Estimated body mass index is 41.66 kg/(m^2) as calculated from the following:    Height as of 4/10/17: 5' 7.5\" (1.715 m).    Weight as of 4/10/17: 270 lb (122.5 kg).  Medication Reconciliation: complete    Health Maintenance that is potentially due pending provider review:  NONE    n/a    "

## 2017-05-11 NOTE — MR AVS SNAPSHOT
After Visit Summary   5/11/2017    Melania Alicea    MRN: [id number]           Patient Information     Date Of Birth          1945        Visit Information        Provider Department      5/11/2017 1:00 PM Betty Arenas APRN [facility]        Today's Diagnoses     Cellulitis of right lower extremity        Venous stasis dermatitis of right lower extremity          Care Instructions        Discharge Instructions for Cellulitis  You have been diagnosed with cellulitis. This is an infection in the deepest layer of the skin. In some cases, the infection also affects the muscle. Cellulitis is caused by bacteria. The bacteria can enter the body through broken skin. This can happen with a cut, scratch, animal bite, or an insect bite that has been scratched. You may have been treated in the hospital with antibiotics and fluids. You will likely be given a prescription for antibiotics to take at home. This sheet will help you take care of yourself at home.  Home Care  When you are home:    Take the prescribed antibiotic medication you are given as directed until it is gone. Take it even if you feel better. It treats the infection and stops it from returning. Not taking all of the medication can make future infections hard to treat.    Keep the infected area clean.    When possible, raise the infected area above the level of your heart. This helps keep swelling down.    Talk to your doctor if you are in pain. Ask what kind of over-the-counter medication you can take for pain.    Apply clean bandages as advised.    Take your temperature once a day for a week.    Wash your hands often to prevent spreading the infection.  In the future, wash your hands before and after you touch cuts, scratches, or bandages. This will help prevent infection.   When to Call Your Doctor  Call your doctor immediately if you have any of the following:    Vomiting    Fever of100.4 F (38 C) or higher, or as  directed by your health care provider    Shaking chills    Redness that gets worse in or around the infected area    Swelling of the infected area    Pain that gets worse in or around the infected area    Difficulty or pain when moving the joints above or below the infected area    Discharge or pus draining from the area     1501-1794 [employer]. [address] [place] [address]. All rights reserved. This information is not intended as a substitute for professional medical care. Always follow your healthcare professional's instructions.        Leg Swelling (1 Leg Only)  Swelling of the arms, feet, ankles, and legs is called edema. It is caused by extra fluid collecting in the tissues. Because of gravity, extra fluid in the body settles to the lowest part. That is why the legs and feet are most affected. You have swelling in only 1 leg.  Some of the causes for swelling in only one leg include:    Infection in the foot or leg    Long-term problem with a vein not working well (venous insufficiency)    Swollen, twisted vein in the leg (varicose veins)    Insect bite or sting on the foot or leg    Injury or recent surgery on the foot or leg    Blood clot in a deep vein of the leg (deep vein thrombosis or DVT)  Medical treatment will depend on what is causing your swelling.  Home care  Follow these guidelines when caring for yourself at home:    Don t wear tight clothing.    Keep your legs up while lying or sitting.    Take any medicines as directed.    If infection, injury, or recent surgery is the cause of your swelling, stay off your legs as much as possible until your symptoms get better.    If you have venous insufficiency or varicose veins, don t sit or  one place for long periods of time. Take breaks and walk around every few hours. Talk with your healthcare provider about wearing support stockings to help lessen swelling during the day.  Follow-up care  Follow up with your health  care provider as advised.  Call 911  Call 911 if any of these occur:    Shortness of breath or trouble breathing    Chest pain    Coughing up blood    Fainting or loss of consciousness   When to seek medical advice  Call your healthcare provider right away if any of these occur:    Increased pain, swelling, warmth, or redness of the leg, ankle, or foot    Fever of 100.4 F (38 C) or higher, or as directed by your healthcare provider    Weakness or dizziness    9106-3888 [employer]. [address] [place] [address]. All rights reserved. This information is not intended as a substitute for professional medical care. Always follow your healthcare professional's instructions.              Follow-ups after your visit        Your next 10 appointments already scheduled     May 19, 2017  1:45 PM CDT   [facility]hedema [facility]   [facility]    [address] [place] [address]   [phone]              Who to contact     If you have questions or need follow up information about today's clinic visit or your schedule please contact [facility] directly at [phone].  Normal or non-critical lab and imaging results will be communicated to you by MyChart, letter or phone within 4 business days after the clinic has received the results. If you do not hear from us within 7 days, please contact the clinic through [employer] or phone. If you have a critical or abnormal lab result, we will notify you by phone as soon as possible.  Submit refill requests through [employer] or call your pharmacy and they will forward the refill request to us. Please allow 3 business days for your refill to be completed.          Additional Information About Your Visit        [employer] Information     [employer] gives you secure access to your electronic health record. If you see a primary care provider, you can also send messages to your care  team and make appointments. If you have questions, please call your primary care clinic.  If you do not have a primary care provider, please call [phone] and they will assist you.        Care EveryWhere ID     This is your Care EveryWhere ID. This could be used by other organizations to access your [place] medical records  [id number]        Your Vitals Were     Pulse Temperature Pulse Oximetry BMI (Body Mass Index)          72 97.3  F (36.3  C) (Tympanic) 99% 41.14 kg/m2         Blood Pressure from Last 3 Encounters:   05/11/17 132/80   04/10/17 149/87   04/05/17 110/70    Weight from Last 3 Encounters:   05/11/17 266 lb 9.6 oz (120.9 kg)   04/10/17 270 lb (122.5 kg)   04/05/17 273 lb 9.6 oz (124.1 kg)              Today, you had the following     No orders found for display         Today's Medication Changes          These changes are accurate as of: 5/11/17  1:45 PM.  If you have any questions, ask your nurse or doctor.               Stop taking these medicines if you haven't already. Please contact your care team if you have questions.     acetaminophen-codeine 300-30 MG per tablet   Commonly known as:  TYLENOL #3   Stopped by:  Betty Arenas APRN CNP           MEDINEY WOUND/BURN DRESSING EX   Stopped by:  Betty Arenas APRN CNP           triamcinolone 0.5 % Oint ointment   Commonly known as:  KENALOG   Stopped by:  Betty Arenas APRN CNP           TRIDERMA FORTE Crea   Stopped by:  Betty Arenas APRN CNP                Where to get your medicines      These medications were sent to [facility] - [place], [place] - [address]  [address] [place] [address]     Phone:  [phone]     cephALEXin 500 MG capsule                Primary Care Provider Office Phone # Fax #    JOANN Dorsey [phone]       [facility] [address] [facility] [place] [address]        Thank you!     Thank you for choosing [facility]   for your care. Our goal is always to provide you with excellent care. Hearing back from our patients is one way we can continue to improve our services. Please take a few minutes to complete the written survey that you may receive in the mail after your visit with us. Thank you!             Your Updated Medication List - Protect others around you: Learn how to safely use, store and throw away your medicines at www.disposemymeds.org.          This list is accurate as of: 5/11/17  1:45 PM.  Always use your most recent med list.                   Brand Name Dispense Instructions for use    ADVIL 200 MG capsule   Generic drug:  ibuprofen      ONE TO TWO TABLETS EVERY 6-8 HOURS AS NEEDED       cephALEXin 500 MG capsule    KEFLEX    30 capsule    Take 1 capsule (500 mg) by mouth 3 times daily       estradiol 0.5 MG tablet    ESTRACE    24 tablet    Place vaginally twice a week       VITAMIN D (CHOLECALCIFEROL) PO      Take 1,000 Units by mouth daily

## 2017-05-11 NOTE — PATIENT INSTRUCTIONS
Discharge Instructions for Cellulitis  You have been diagnosed with cellulitis. This is an infection in the deepest layer of the skin. In some cases, the infection also affects the muscle. Cellulitis is caused by bacteria. The bacteria can enter the body through broken skin. This can happen with a cut, scratch, animal bite, or an insect bite that has been scratched. You may have been treated in the hospital with antibiotics and fluids. You will likely be given a prescription for antibiotics to take at home. This sheet will help you take care of yourself at home.  Home Care  When you are home:    Take the prescribed antibiotic medication you are given as directed until it is gone. Take it even if you feel better. It treats the infection and stops it from returning. Not taking all of the medication can make future infections hard to treat.    Keep the infected area clean.    When possible, raise the infected area above the level of your heart. This helps keep swelling down.    Talk to your doctor if you are in pain. Ask what kind of over-the-counter medication you can take for pain.    Apply clean bandages as advised.    Take your temperature once a day for a week.    Wash your hands often to prevent spreading the infection.  In the future, wash your hands before and after you touch cuts, scratches, or bandages. This will help prevent infection.   When to Call Your Doctor  Call your doctor immediately if you have any of the following:    Vomiting    Fever of100.4 F (38 C) or higher, or as directed by your health care provider    Shaking chills    Redness that gets worse in or around the infected area    Swelling of the infected area    Pain that gets worse in or around the infected area    Difficulty or pain when moving the joints above or below the infected area    Discharge or pus draining from the area     5880-8378 [employer]. [address] [place] [address], [place] [address]. All rights reserved. This  information is not intended as a substitute for professional medical care. Always follow your healthcare professional's instructions.        Leg Swelling   Swelling of the arms, feet, ankles, and legs is called edema. It is caused by extra fluid collecting in the tissues. Because of gravity, extra fluid in the body settles to the lowest part. That is why the legs and feet are most affected. You have swelling in only 1 leg.  Some of the causes for swelling in only one leg include:    Infection in the foot or leg    Long-term problem with a vein not working well (venous insufficiency)    Swollen, twisted vein in the leg (varicose veins)    Insect bite or sting on the foot or leg    Injury or recent surgery on the foot or leg    Blood clot in a deep vein of the leg (deep vein thrombosis or DVT)  Medical treatment will depend on what is causing your swelling.  Home care  Follow these guidelines when caring for yourself at home:    Don t wear tight clothing.    Keep your legs up while lying or sitting.    Take any medicines as directed.    If infection, injury, or recent surgery is the cause of your swelling, stay off your legs as much as possible until your symptoms get better.    If you have venous insufficiency or varicose veins, don t sit or  one place for long periods of time. Take breaks and walk around every few hours. Talk with your healthcare provider about wearing support stockings to help lessen swelling during the day.  Follow-up care  Follow up with your health care provider as advised.  Call 911  Call 911 if any of these occur:    Shortness of breath or trouble breathing    Chest pain    Coughing up blood    Fainting or loss of consciousness   When to seek medical advice  Call your healthcare provider right away if any of these occur:    Increased pain, swelling, warmth, or redness of the leg, ankle, or foot    Fever of 100.4 F (38 C) or higher, or as directed by your healthcare provider    Weakness  or dizziness    6272-6046 [employer]. [address] [place] [address], [place] [address]. All rights reserved. This information is not intended as a substitute for professional medical care. Always follow your healthcare professional's instructions.

## 2017-05-19 ENCOUNTER — HOSPITAL ENCOUNTER (OUTPATIENT)
Dept: PHYSICAL THERAPY | Facility: CLINIC | Age: 72
Setting detail: THERAPIES SERIES
End: 2017-05-19
Attending: NURSE PRACTITIONER
Payer: MEDICARE

## 2017-05-19 PROCEDURE — 40000449 ZZHC STATISTIC PT VISIT, LYMPHEDEMA: Performed by: PHYSICAL THERAPIST

## 2017-05-19 PROCEDURE — 97140 MANUAL THERAPY 1/> REGIONS: CPT | Mod: GP | Performed by: PHYSICAL THERAPIST

## 2017-05-22 ENCOUNTER — OFFICE VISIT (OUTPATIENT)
Dept: FAMILY MEDICINE | Facility: CLINIC | Age: 72
End: 2017-05-22
Payer: COMMERCIAL

## 2017-05-22 VITALS
BODY MASS INDEX: 40.89 KG/M2 | TEMPERATURE: 99.3 F | WEIGHT: 265 LBS | HEART RATE: 66 BPM | DIASTOLIC BLOOD PRESSURE: 88 MMHG | RESPIRATION RATE: 16 BRPM | SYSTOLIC BLOOD PRESSURE: 130 MMHG

## 2017-05-22 DIAGNOSIS — E66.01 MORBID OBESITY DUE TO EXCESS CALORIES (H): ICD-10-CM

## 2017-05-22 DIAGNOSIS — R60.0 LEG EDEMA, RIGHT: Primary | ICD-10-CM

## 2017-05-22 PROCEDURE — 99214 OFFICE O/P EST MOD 30 MIN: CPT | Performed by: FAMILY MEDICINE

## 2017-05-22 RX ORDER — FUROSEMIDE 20 MG
20 TABLET ORAL 2 TIMES DAILY
Qty: 60 TABLET | Refills: 0 | Status: SHIPPED | OUTPATIENT
Start: 2017-05-22 | End: 2017-07-06

## 2017-05-22 NOTE — NURSING NOTE
"Chief Complaint   Patient presents with     Cellulitis     right leg - ongoing       Initial /88  Pulse 66  Temp 99.3  F (37.4  C) (Tympanic)  Resp 16  Wt 265 lb (120.2 kg)  Breastfeeding? No  BMI 40.89 kg/m2 Estimated body mass index is 40.89 kg/(m^2) as calculated from the following:    Height as of 4/10/17: 5' 7.5\" (1.715 m).    Weight as of this encounter: 265 lb (120.2 kg).  Medication Reconciliation: complete    Health Maintenance that is potentially due pending provider review:  Dexa    Pt will schedule  appt.    "

## 2017-05-22 NOTE — PROGRESS NOTES
SUBJECTIVE:                                                    Melania Alicea is a 71 year old female who presents to clinic today for the following health issues:       Cellulitis       Duration: 2 months    Description (location/character/radiation): right leg    Intensity:  moderate    Accompanying signs and symptoms: none    History (similar episodes/previous evaluation): previous evaluations, treated with Keflex 3 different times.    Precipitating or alleviating factors: None    Therapies tried and outcome: Keflex       S: Melania Alicea is a 71 year old female with 2 months of R leg s welling.  Started with redness/heat/swelling.  Pain.  Cellulitis.  Has received multiple rounds of keflex.  Has improved, but swellign and redness still there.  Wrapping, lymphedema clinic.  Stubborn to resolve.     Much improved with wrapping, but some weeping and tense skin at times.  Not much edema on L leg    Ultrasound beginning of this was negative for DVT.     Morbid obesity contributing to swelling /edema     No cp or sob    No fever    Not keeping elevated as a rule at home    O:/88  Pulse 66  Temp 99.3  F (37.4  C) (Tympanic)  Resp 16  Wt 265 lb (120.2 kg)  Breastfeeding? No  BMI 40.89 kg/m2  GEN: Alert and oriented, in no acute distress  Has tense edema R lower leg 1/2 way up calf.  Not tender or hot.  Firm skin, indurated somewhat.  Evidence of some prior weeping, none today.    A: lymphedema R leg, not resolving      Morbid obesity       P: need to make sure clot hasn't developed given appearance and unilateral nature.  I don't think it's acutely infected right now.  Has been on a a lot of abx.      Will do diuretics, keep elevated, continue wraps, recheck ultrasound.  If redness/heat/pain worsening, will do levaquin or clinda or augmentin for recurrent cellulitis.  She likes and agrees with plan, will f/u in 1-2 weeks here, depending on how she's doing.

## 2017-05-22 NOTE — MR AVS SNAPSHOT
After Visit Summary   5/22/2017    Melania Alicea    MRN: [id number]           Patient Information     Date Of Birth          1945        Visit Information        Provider Department      5/22/2017 10:20 AM Ed Babb MD [facility]        Today's Diagnoses     Leg edema, right    -  1    Morbid obesity due to excess calories (H)           Follow-ups after your visit        Your next 10 appointments already scheduled     Jul 06, 2017  2:15 PM CDT   New Visit with Vanessa Yap MD   [facility]    [address] [facility] [address]   [phone]              Future tests that were ordered for you today     Open Future Orders        Priority Expected Expires Ordered    US Lower Extremity Venous Duplex Right Routine  5/22/2018 5/22/2017            Who to contact     If you have questions or need follow up information about today's clinic visit or your schedule please contact [facility] directly at [phone].  Normal or non-critical lab and imaging results will be communicated to you by [employer], letter or phone within 4 business days after the clinic has received the results. If you do not hear from us within 7 days, please contact the clinic through [employer] or phone. If you have a critical or abnormal lab result, we will notify you by phone as soon as possible.  Submit refill requests through [employer] or call your pharmacy and they will forward the refill request to us. Please allow 3 business days for your refill to be completed.          Additional Information About Your Visit        MyChart Information     [employer] gives you secure access to your electronic health record. If you see a primary care provider, you can also send messages to your care team and make appointments. If you have questions, please call your primary care clinic.  If you do not have a primary care provider, please call [phone] and  they will assist you.        Care EveryWhere ID     This is your Care EveryWhere ID. This could be used by other organizations to access your [place] medical records  [id number]        Your Vitals Were     Pulse Temperature Respirations Breastfeeding? BMI (Body Mass Index)       66 99.3  F (37.4  C) (Tympanic) 16 No 40.89 kg/m2        Blood Pressure from Last 3 Encounters:   05/22/17 130/88   05/11/17 132/80   04/10/17 149/87    Weight from Last 3 Encounters:   05/22/17 265 lb (120.2 kg)   05/11/17 266 lb 9.6 oz (120.9 kg)   04/10/17 270 lb (122.5 kg)                 Today's Medication Changes          These changes are accurate as of: 5/22/17 11:17 AM.  If you have any questions, ask your nurse or doctor.               Start taking these medicines.        Dose/Directions    furosemide 20 MG tablet   Commonly known as:  LASIX   Used for:  Leg edema, right   Started by:  Ed Babb MD        Dose:  20 mg   Take 1 tablet (20 mg) by mouth 2 times daily   Quantity:  60 tablet   Refills:  0            Where to get your medicines      These medications were sent to [facility] - [place], [place] - [address]  [address] [place] [address]     Phone:  [phone]     furosemide 20 MG tablet                Primary Care Provider Office Phone # Fax #    Bettykari KleinJOANN Maldonado [facility] [phone]       [facility] [address] [facility] [address]        Thank you!     Thank you for choosing [facility]  for your care. Our goal is always to provide you with excellent care. Hearing back from our patients is one way we can continue to improve our services. Please take a few minutes to complete the written survey that you may receive in the mail after your visit with us. Thank you!             Your Updated Medication List - Protect others around you: Learn how to safely use, store and throw away your medicines at www.disposemymeds.org.          This list is accurate as of:  5/22/17 11:17 AM.  Always use your most recent med list.                   Brand Name Dispense Instructions for use    ADVIL 200 MG capsule   Generic drug:  ibuprofen      ONE TO TWO TABLETS EVERY 6-8 HOURS AS NEEDED       estradiol 0.5 MG tablet    ESTRACE    24 tablet    Place vaginally twice a week       furosemide 20 MG tablet    LASIX    60 tablet    Take 1 tablet (20 mg) by mouth 2 times daily       VITAMIN D (CHOLECALCIFEROL) PO      Take 1,000 Units by mouth daily

## 2017-05-25 ENCOUNTER — RADIANT APPOINTMENT (OUTPATIENT)
Dept: ULTRASOUND IMAGING | Facility: CLINIC | Age: 72
End: 2017-05-25
Attending: FAMILY MEDICINE
Payer: COMMERCIAL

## 2017-05-25 DIAGNOSIS — R60.0 LEG EDEMA, RIGHT: ICD-10-CM

## 2017-05-25 PROCEDURE — 93971 EXTREMITY STUDY: CPT | Mod: RT

## 2017-06-06 ENCOUNTER — OFFICE VISIT (OUTPATIENT)
Dept: FAMILY MEDICINE | Facility: CLINIC | Age: 72
End: 2017-06-06
Payer: COMMERCIAL

## 2017-06-06 VITALS
RESPIRATION RATE: 17 BRPM | WEIGHT: 267 LBS | TEMPERATURE: 98.6 F | SYSTOLIC BLOOD PRESSURE: 128 MMHG | BODY MASS INDEX: 41.2 KG/M2 | HEART RATE: 67 BPM | DIASTOLIC BLOOD PRESSURE: 82 MMHG

## 2017-06-06 DIAGNOSIS — I87.2 VENOUS STASIS DERMATITIS OF RIGHT LOWER EXTREMITY: Primary | ICD-10-CM

## 2017-06-06 PROCEDURE — 99214 OFFICE O/P EST MOD 30 MIN: CPT | Performed by: NURSE PRACTITIONER

## 2017-06-06 PROCEDURE — 87070 CULTURE OTHR SPECIMN AEROBIC: CPT | Performed by: NURSE PRACTITIONER

## 2017-06-06 PROCEDURE — 87205 SMEAR GRAM STAIN: CPT | Performed by: NURSE PRACTITIONER

## 2017-06-06 RX ORDER — BETAMETHASONE DIPROPIONATE 0.5 MG/ML
LOTION, AUGMENTED TOPICAL
Qty: 180 ML | Refills: 2 | Status: SHIPPED | OUTPATIENT
Start: 2017-06-06 | End: 2017-06-08

## 2017-06-06 NOTE — MR AVS SNAPSHOT
After Visit Summary   6/6/2017    Melania Alicea    MRN: [id number]           Patient Information     Date Of Birth          1945        Visit Information        Provider Department      6/6/2017 3:00 PM Betty Arenas APRN [facility]        Today's Diagnoses     Venous stasis dermatitis of right lower extremity    -  1      Care Instructions      Chronic Venous Insufficiency: Treating Ulcers    If leg swelling due to chronic venous insufficiency isn t controlled, an ulcer (open wound) can form. Although ulcers vary in size and shape, they usually appear on the inside of the ankle.  Treating an Ulcer    Visit your doctor. Ulcers need frequent medical care. Special dressings may be applied. You may be given antibiotics to fight infection.    Your doctor may prescribe medications, such as aspirin or pentoxifylline, to help the ulcer heal.    Elevate your legs often to reduce swelling. The ulcer needs oxygen-rich blood to heal. This blood can t reach the ulcer until swelling is reduced.  When to Call Your Doctor  Contact your doctor right away if:    You have an increase in pain.    You develop a fever of 101 F (38.3 C) or higher.    The ulcer oozes discolored fluid or smells bad.    Swelling increases suddenly or the dressing feels tight.     0692-0309 [employer]. [address] [place] [address], [place] [address]. All rights reserved. This information is not intended as a substitute for professional medical care. Always follow your healthcare professional's instructions.        Leg Swelling (1 Leg Only)  Swelling of the arms, feet, ankles, and legs is called edema. It is caused by extra fluid collecting in the tissues. Because of gravity, extra fluid in the body settles to the lowest part. That is why the legs and feet are most affected. You have swelling in only 1 leg.  Some of the causes for swelling in only one leg include:    Infection in the foot or  leg    Long-term problem with a vein not working well (venous insufficiency)    Swollen, twisted vein in the leg (varicose veins)    Insect bite or sting on the foot or leg    Injury or recent surgery on the foot or leg    Blood clot in a deep vein of the leg (deep vein thrombosis or DVT)  Medical treatment will depend on what is causing your swelling.  Home care  Follow these guidelines when caring for yourself at home:    Don t wear tight clothing.    Keep your legs up while lying or sitting.    Take any medicines as directed.    If infection, injury, or recent surgery is the cause of your swelling, stay off your legs as much as possible until your symptoms get better.    If you have venous insufficiency or varicose veins, don t sit or  one place for long periods of time. Take breaks and walk around every few hours. Talk with your healthcare provider about wearing support stockings to help lessen swelling during the day.  Follow-up care  Follow up with your health care provider as advised.  Call 911  Call 911 if any of these occur:    Shortness of breath or trouble breathing    Chest pain    Coughing up blood    Fainting or loss of consciousness   When to seek medical advice  Call your healthcare provider right away if any of these occur:    Increased pain, swelling, warmth, or redness of the leg, ankle, or foot    Fever of 100.4 F (38 C) or higher, or as directed by your healthcare provider    Weakness or dizziness    3848-8263 [employer]. [address] [place] [address]. All rights reserved. This information is not intended as a substitute for professional medical care. Always follow your healthcare professional's instructions.                Follow-ups after your visit        Your next 10 appointments already scheduled     Jul 06, 2017  2:15 PM CDT   [facility] Visit [facility]essa [facility]ap MD   [facility]    [address] [facility]  [address]   [phone]            Jul 20, 2017 10:15 AM CDT   SHORT with Kristi Merchant MD   [facility]    [address] [place]  [facility] [address]   [phone]              Who to contact     If you have questions or need follow up information about today's clinic visit or your schedule please contact [facility] directly at [phone].  Normal or non-critical lab and imaging results will be communicated to you by Kid Bunchhart, letter or phone within 4 business days after the clinic has received the results. If you do not hear from us within 7 days, please contact the clinic through [employer] or phone. If you have a critical or abnormal lab result, we will notify you by phone as soon as possible.  Submit refill requests through [employer] or call your pharmacy and they will forward the refill request to us. Please allow 3 business days for your refill to be completed.          Additional Information About Your Visit        Kid Bunchhart Information     [employer] gives you secure access to your electronic health record. If you see a primary care provider, you can also send messages to your care team and make appointments. If you have questions, please call your primary care clinic.  If you do not have a primary care provider, please call [phone] and they will assist you.        Care EveryWhere ID     This is your Care EveryWhere ID. This could be used by other organizations to access your [facility] medical records  [id number]        Your Vitals Were     Pulse Temperature Respirations BMI (Body Mass Index)          67 98.6  F (37  C) (Tympanic) 17 41.2 kg/m2         Blood Pressure from Last 3 Encounters:   06/06/17 128/82   05/22/17 130/88   05/11/17 132/80    Weight from Last 3 Encounters:   06/06/17 267 lb (121.1 kg)   05/22/17 265 lb (120.2 kg)   05/11/17 266 lb 9.6 oz (120.9 kg)              Today, you had the following     No orders found  for display         Today's Medication Changes          These changes are accurate as of: 6/6/17  3:45 PM.  If you have any questions, ask your nurse or doctor.               Start taking these medicines.        Dose/Directions    betamethasone (augmented) 0.05 % lotion   Commonly known as:  DIPROLENE   Used for:  Venous stasis dermatitis of right lower extremity   Started by:  Betty Arenas APRN CNP        Apply sparingly to affected area twice daily as needed.  Do not apply to face.   Quantity:  180 mL   Refills:  2            Where to get your medicines      These medications were sent to [facility] - [place], [place] - [address]  [address] [place] [address]     Phone:  [phone]     betamethasone (augmented) 0.05 % lotion                Primary Care Provider Office Phone # Fax #    JOANN Dorsey [phone]       [facility] [address] [facility] [address]        Thank you!     Thank you for choosing [facility]  for your care. Our goal is always to provide you with excellent care. Hearing back from our patients is one way we can continue to improve our services. Please take a few minutes to complete the written survey that you may receive in the mail after your visit with us. Thank you!             Your Updated Medication List - Protect others around you: Learn how to safely use, store and throw away your medicines at www.disposemymeds.org.          This list is accurate as of: 6/6/17  3:45 PM.  Always use your most recent med list.                   Brand Name Dispense Instructions for use    ADVIL 200 MG capsule   Generic drug:  ibuprofen      ONE TO TWO TABLETS EVERY 6-8 HOURS AS NEEDED       betamethasone (augmented) 0.05 % lotion    DIPROLENE    180 mL    Apply sparingly to affected area twice daily as needed.  Do not apply to face.       estradiol 0.5 MG tablet    ESTRACE    24 tablet    Place vaginally twice a week        furosemide 20 MG tablet    LASIX    60 tablet    Take 1 tablet (20 mg) by mouth 2 times daily       VITAMIN D (CHOLECALCIFEROL) PO      Take 1,000 Units by mouth daily

## 2017-06-06 NOTE — PATIENT INSTRUCTIONS
Chronic Venous Insufficiency: Treating Ulcers    If leg swelling due to chronic venous insufficiency isn t controlled, an ulcer (open wound) can form. Although ulcers vary in size and shape, they usually appear on the inside of the ankle.  Treating an Ulcer    Visit your doctor. Ulcers need frequent medical care. Special dressings may be applied. You may be given antibiotics to fight infection.    Your doctor may prescribe medications, such as aspirin or pentoxifylline, to help the ulcer heal.    Elevate your legs often to reduce swelling. The ulcer needs oxygen-rich blood to heal. This blood can t reach the ulcer until swelling is reduced.  When to Call Your Doctor  Contact your doctor right away if:    You have an increase in pain.    You develop a fever of 101 F (38.3 C) or higher.    The ulcer oozes discolored fluid or smells bad.    Swelling increases suddenly or the dressing feels tight.     3881-6448 [employer]. [address] [place] [address]. All rights reserved. This information is not intended as a substitute for professional medical care. Always follow your healthcare professional's instructions.        Leg Swelling (1 Leg Only)  Swelling of the arms, feet, ankles, and legs is called edema. It is caused by extra fluid collecting in the tissues. Because of gravity, extra fluid in the body settles to the lowest part. That is why the legs and feet are most affected. You have swelling in only 1 leg.  Some of the causes for swelling in only one leg include:    Infection in the foot or leg    Long-term problem with a vein not working well (venous insufficiency)    Swollen, twisted vein in the leg (varicose veins)    Insect bite or sting on the foot or leg    Injury or recent surgery on the foot or leg    Blood clot in a deep vein of the leg (deep vein thrombosis or DVT)  Medical treatment will depend on what is causing your swelling.  Home care  Follow these guidelines when caring  for yourself at home:    Don t wear tight clothing.    Keep your legs up while lying or sitting.    Take any medicines as directed.    If infection, injury, or recent surgery is the cause of your swelling, stay off your legs as much as possible until your symptoms get better.    If you have venous insufficiency or varicose veins, don t sit or  one place for long periods of time. Take breaks and walk around every few hours. Talk with your healthcare provider about wearing support stockings to help lessen swelling during the day.  Follow-up care  Follow up with your health care provider as advised.  Call 911  Call 911 if any of these occur:    Shortness of breath or trouble breathing    Chest pain    Coughing up blood    Fainting or loss of consciousness   When to seek medical advice  Call your healthcare provider right away if any of these occur:    Increased pain, swelling, warmth, or redness of the leg, ankle, or foot    Fever of 100.4 F (38 C) or higher, or as directed by your healthcare provider    Weakness or dizziness    2099-8627 [employer]. [address] [place] [address]. All rights reserved. This information is not intended as a substitute for professional medical care. Always follow your healthcare professional's instructions.

## 2017-06-06 NOTE — NURSING NOTE
"Chief Complaint   Patient presents with     Derm Problem     lower leg cellulitus       Initial There were no vitals taken for this visit. Estimated body mass index is 40.89 kg/(m^2) as calculated from the following:    Height as of 4/10/17: 5' 7.5\" (1.715 m).    Weight as of 5/22/17: 265 lb (120.2 kg).  Medication Reconciliation: complete    Health Maintenance that is potentially due pending provider review:  NONE    n/a      "

## 2017-06-06 NOTE — PROGRESS NOTES
SUBJECTIVE:                                                    Melania Alicea is a 71 year old female who presents to clinic today for the following health issues:      Venous stasis dermatitis        Duration: recheck has had for 2 months    Description (location/character/radiation): lower right leg    Intensity:  mild    Accompanying signs and symptoms: drainage    History (similar episodes/previous evaluation): cellulitus in the past    Precipitating or alleviating factors: None    Therapies tried and outcome: keflex and daria boot     New areas of drainage  RLE itching and red.   Wondering if further meds are needed.  New compression garment.     -------------------------------------    Problem list and histories reviewed & adjusted, as indicated.  Additional history: as documented    Labs reviewed in EPIC    Reviewed and updated as needed this visit by clinical staff  Allergies       Reviewed and updated as needed this visit by Provider         ROS:  C: NEGATIVE for fever, chills, change in weight  INTEGUMENTARY/SKIN: see above   E/M: NEGATIVE for ear, mouth and throat problems  R: NEGATIVE for significant cough or SOB  CV: NEGATIVE for chest pain, palpitations or peripheral edema  ROS otherwise negative    OBJECTIVE:                                                    /82  Pulse 67  Temp 98.6  F (37  C) (Tympanic)  Resp 17  Wt 267 lb (121.1 kg)  BMI 41.2 kg/m2  Body mass index is 41.2 kg/(m^2).   GENERAL: healthy, alert, well nourished, well hydrated, no distress  HENT: ear canals- normal; TMs- normal; Nose- normal; Mouth- no ulcers, no lesions  NECK: no tenderness, no adenopathy, no asymmetry, no masses, no stiffness; thyroid- normal to palpation  RESP: lungs clear to auscultation - no rales, no rhonchi, no wheezes  CV: regular rates and rhythm, normal S1 S2, no S3 or S4 and no murmur, no click or rub -  ABDOMEN: soft, no tenderness, no  hepatosplenomegaly, no masses, normal bowel sounds  MS:  extremities- no gross deformities noted, 1+ RLE  edema  With erythema and venous stasis dermatitis.   Drainage from 2 open blistered areas. Cultured today   Diagnostic test results:  Results for orders placed or performed in visit on 06/06/17   Wound Culture Aerobic Bacterial   Result Value Ref Range    Specimen Description Leg Wound     Culture Micro Moderate growth Normal skin avril     Micro Report Status FINAL 06/08/2017    Gram stain   Result Value Ref Range    Specimen Description Leg Wound     Gram Stain No organisms seen  No WBCs seen       Micro Report Status FINAL 06/07/2017         ASSESSMENT/PLAN:                                                    1. Venous stasis dermatitis of right lower extremity  Compression dressing on in AM off in PM  Will culture drainage and call with results.   Steroid cream for comfort as needed.   - Wound Culture Aerobic Bacterial  - Gram stain      Follow up with Provider - Call or return to the clinic with any worsening of symptoms or no resolution. Patient/Parent verbalized understanding and is in agreement. Medication side effects reviewed.   Current Outpatient Prescriptions   Medication Sig Dispense Refill     furosemide (LASIX) 20 MG tablet Take 1 tablet (20 mg) by mouth 2 times daily 60 tablet 0     estradiol (ESTRACE) 0.5 MG tablet Place vaginally twice a week 24 tablet 3     VITAMIN D, CHOLECALCIFEROL, PO Take 1,000 Units by mouth daily       ADVIL 200 MG PO CAPS ONE TO TWO TABLETS EVERY 6-8 HOURS AS NEEDED  0     augmented betamethasone dipropionate (DIPROLENE-AF) 0.05 % cream Apply topically 2 times daily 50 g 0        See Patient Instructions    JOANN Dorsey [facility]

## 2017-06-07 ENCOUNTER — MYC MEDICAL ADVICE (OUTPATIENT)
Dept: FAMILY MEDICINE | Facility: CLINIC | Age: 72
End: 2017-06-07

## 2017-06-07 DIAGNOSIS — I87.2 VENOUS STASIS DERMATITIS OF RIGHT LOWER EXTREMITY: Primary | ICD-10-CM

## 2017-06-07 LAB
GRAM STN SPEC: NORMAL
MICRO REPORT STATUS: NORMAL
SPECIMEN SOURCE: NORMAL

## 2017-06-08 ENCOUNTER — MYC MEDICAL ADVICE (OUTPATIENT)
Dept: FAMILY MEDICINE | Facility: CLINIC | Age: 72
End: 2017-06-08

## 2017-06-08 LAB
BACTERIA SPEC CULT: NORMAL
MICRO REPORT STATUS: NORMAL
SPECIMEN SOURCE: NORMAL

## 2017-06-08 RX ORDER — BETAMETHASONE DIPROPIONATE 0.5 MG/G
CREAM TOPICAL 2 TIMES DAILY
Qty: 50 G | Refills: 0 | Status: SHIPPED | OUTPATIENT
Start: 2017-06-08 | End: 2017-08-09

## 2017-07-06 ENCOUNTER — OFFICE VISIT (OUTPATIENT)
Dept: NEUROLOGY | Facility: CLINIC | Age: 72
End: 2017-07-06
Payer: COMMERCIAL

## 2017-07-06 VITALS
WEIGHT: 265.8 LBS | TEMPERATURE: 98.2 F | BODY MASS INDEX: 41.02 KG/M2 | SYSTOLIC BLOOD PRESSURE: 131 MMHG | HEART RATE: 73 BPM | DIASTOLIC BLOOD PRESSURE: 72 MMHG

## 2017-07-06 DIAGNOSIS — R29.898 LEG WEAKNESS, BILATERAL: Primary | ICD-10-CM

## 2017-07-06 PROCEDURE — 99204 OFFICE O/P NEW MOD 45 MIN: CPT | Performed by: PSYCHIATRY & NEUROLOGY

## 2017-07-06 ASSESSMENT — PAIN SCALES - GENERAL: PAINLEVEL: MILD PAIN (2)

## 2017-07-06 NOTE — PROGRESS NOTES
"INITIAL NEUROLOGY CONSULTATION    DATE OF VISIT: 7/6/2017  MRN: [id number]  PATIENT NAME: Melania Alicea  YOB: 1945    REFERRING PROVIDER: No ref. provider found    Chief Complaint   Patient presents with     Consult     Possible CMT.  Referal by Betty Arenas CNP.       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 The patient tells me that she thinks she has this disease because it is prevalent in her family (she says that her mother and most of the maternal aunts and uncles had it). She says that her daughter also \"has it in the wrists.\" When I ask for clarification, she says her daughter has wrist weakness.     The patient says that she has difficulty with balance, difficulty getting up from a chair. Steps are difficult. She ambulates with a cane. She denies prominent muscle weakness in the upper extremities. She says mainly the legs are affected. She does have some leg pain, especially at night. She describes this as aching. She feels that her arthritis also flares with the changes in the weather. \"IBuprofen is my friend.\"    She feels that if she were to fall, she would not be able to get up. No recent falls (most recent was 4 years ago by her estimation). She feels that she has no muscles left in the legs, though they have not changed in size. She does endorse Left leg numbness with prolonged standing. She tells me that the weakness started around age 48. She enjoys gardening but has to bend to do the work from a standing position. Rare lower back pain.     The patient has additional history of recent cellulitis in the Rt lower leg. The patient says she had some problems with dizziness around Easter time. She had room-spinning for an entire day that then dissipated, except for when laying in bed at night for several nights. This has resolved.     The patient " has never been seen by a neurologist, and no genetic testing. In reviewing her chart, it seems that the patient's primary care provider has referred her to neurology on several occasions.       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 Difficulty with stairs, getting up from chairs, weakness in LE's but more so in the left.       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 Cunningham, [place], [facility]         Current Outpatient Prescriptions on File Prior to Visit:  augmented betamethasone dipropionate (DIPROLENE-AF) 0.05 % cream Apply topically 2 times daily   estradiol (ESTRACE) 0.5 MG tablet Place vaginally twice a week   VITAMIN D, CHOLECALCIFEROL, PO Take 1,000 Units by mouth daily   ADVIL 200 MG PO CAPS ONE TO TWO TABLETS EVERY 6-8 HOURS AS NEEDED     No current facility-administered medications on file prior to visit.   Allergies   Allergen Reactions     Nickel Hives     Jewelry       Sulfa Drugs Rash        Problem (# of Occurrences) Relation (Name,Age of Onset)    CEREBROVASCULAR DISEASE (1) Father    Hypertension (1) Mother    Musculoskeletal Disorder (2) Mother: CMT- Charcot Emilee Tooth, Daughter: CMT- Charcot Emilee Tooth    Respiratory (1) Brother: Emphysema        Social History   Substance Use Topics     Smoking status: Never Smoker     Smokeless tobacco: Never Used     Alcohol use No       REVIEW OF SYSTEMS:                                                       10-point review of systems is negative except as mentioned above in HPI.     EXAM:                                                      Physical Exam:   Vitals: /72 (BP Location: Right arm, Patient Position: Chair, Cuff Size: Adult Large)  Pulse 73  Temp 98.2  F (36.8  C) (Oral)  Wt 265 lb 12.8 oz (120.6 kg)  BMI 41.02 kg/m2  BMI= Body mass index is 41.02 kg/(m^2).  GENERAL: NAD.   Neurologic:  MENTAL STATUS: Alert, attentive. Speech is fluent. Normal comprehension. Normal concentration. Adequate fund of knowledge.   CRANIAL NERVES: Discs flat. Visual fields intact to confrontation. Pupils equally, round and reactive to light. Facial sensation and movement normal. Slight asymmetry of the mouth (baseline compared to picture ID from 2 years ago). EOM full. Hearing intact to conversation. Trapezius strength intact (Left shoulder sits lower than Right). Palate moves symmetrically. Tongue midline.  MOTOR: Slight weakness with abduction and adduction at the hip. Knee extension also slightly weak (though question of give-way). Otherwise strength is 5/5 in proximal and distal muscle groups of upper and lower extremities. Tone and bulk normal.   DTRs: Intact and symmetric in UEs and patellae (1+). Absent ankle jerks. Babinski down-going on Left, equivocal on Right.   SENSATION: Normal light touch and pinprick in the hands. Decreased pinprick plantar surfaces bilaterally. Intact proprioception. Vibration: Absent at both ankles.   COORDINATION: Normal finger nose finger. Finger tapping normal. Knee heel shin normal.  STATION AND GAIT: Patient is very anxious when asked to try Romberg (tends to grab the cane); It appears negative. She can stand on either foot alone for several seconds.   EXTR: Edema in RLE below knee(Leg is wrapped), Healing scabs on Left shin with surrounding areas of erythema. Arches appear normal.      ASSESSMENT and PLAN:                                                      Assessment  and Plan:     ICD-10-CM    1. Leg weakness, bilateral R29.898 ORTHO  REFERRAL        Ms. Alicea is a pleasant 70 yo obese woman here for evaluation for possible CMT. The patient has 20+ years of subjective leg weakness, progressive. She is minimally weak on examination of the proximal leg muscles. I do not some features of sensory neuropathy. No obvious muscle atrophy though her calf exam is confounded by edema 2/2 Right lower extremity cellulitis. Her arches appear normal. I would like to have her undergo electrodiagnostic testing. I do not doubt that there could be an underlying neuropathic or neuromuscular problem, however I am not convinced it is of the hereditary type. Of course, there are many phenotypes of CMT, so it is worth looking into. I do suspect that her body habitus and some deconditioning could be playing a role. I will have the patient follow-up with me after the NCS/EMG. The patient understands and agrees with the plan.     Patient Instructions:  Nerve conduction studies/ electromyogram.  Continue to use caution when walking: Make sure you always have your cane.   Return to clinic after the nerve conduction studies.    Total Time: 45 minutes were spent with the patient. More than 50% of the time spent on counseling (as described above in Assessment and Plan) /coordinating the care.    Vanessa Yap MD  Neurology    CC: Betty Arenas CNP

## 2017-07-06 NOTE — NURSING NOTE
"Chief Complaint   Patient presents with     Consult     Possible CMT.  Referal by Betty Arenas CNP.       Initial /72 (BP Location: Right arm, Patient Position: Chair, Cuff Size: Adult Large)  Pulse 73  Temp 98.2  F (36.8  C) (Oral)  Wt 265 lb 12.8 oz (120.6 kg)  BMI 41.02 kg/m2 Estimated body mass index is 41.02 kg/(m^2) as calculated from the following:    Height as of 4/10/17: 5' 7.5\" (1.715 m).    Weight as of this encounter: 265 lb 12.8 oz (120.6 kg).  Medication Reconciliation: complete    Patient prefers to be contacted: Latasha Vasquez to leave detailed message on voicemail: n/a    Sylvia SEO-CMA    "

## 2017-07-06 NOTE — MR AVS SNAPSHOT
After Visit Summary   7/6/2017    Melania Alicea    MRN: [id number]           Patient Information     Date Of Birth          1945        Visit Information        Provider Department      7/6/2017 2:15 PM Vanessa Yap MD [facility]        Today's Diagnoses     Leg weakness, bilateral    -  1      Care Instructions    Plan:    Nerve conduction studies/ electromyogram.  Continue to use caution when walking: Make sure you always have your cane.   Return to clinic after the nerve conduction studies.          Follow-ups after your visit        Additional Services     ORTHO  REFERRAL       [facility] is referring you to the Orthopedic  Services at [facility].       The  Representative will assist you in the coordination of your Orthopedic and Musculoskeletal Care as prescribed by your physician.    The  Representative will call you within 1 business day to help schedule your appointment, or you may contact the  Representative at:    All areas ~ [phone]     Type of Referral : Non Surgical - Dr. Hudson for electromyogram. Patient says that she has CMT. No previous work-up.       Timeframe requested: Routine    Coverage of these services is subject to the terms and limitations of your health insurance plan.  Please call member services at your health plan with any benefit or coverage questions.      If X-rays, CT or MRI's have been performed, please contact the facility where they were done to arrange for , prior to your scheduled appointment.  Please bring this referral request to your appointment and present it to your specialist.                  Follow-up notes from your care team     Return in about 2 months (around 9/6/2017).      Your next 10 appointments already scheduled     Jul 20, 2017 10:15 AM CDT   SHORT with Kristi Merchant MD   [facility] ([place]  [facility])    [address]  [facility] [address]   [phone]              Who to contact     If you have questions or need follow up information about today's clinic visit or your schedule please contact [facility] directly at [phone].  Normal or non-critical lab and imaging results will be communicated to you by MyChart, letter or phone within 4 business days after the clinic has received the results. If you do not hear from us within 7 days, please contact the clinic through MyChart or phone. If you have a critical or abnormal lab result, we will notify you by phone as soon as possible.  Submit refill requests through [employer] or call your pharmacy and they will forward the refill request to us. Please allow 3 business days for your refill to be completed.          Additional Information About Your Visit        MyChart Information     [employer] gives you secure access to your electronic health record. If you see a primary care provider, you can also send messages to your care team and make appointments. If you have questions, please call your primary care clinic.  If you do not have a primary care provider, please call [phone] and they will assist you.        Care EveryWhere ID     This is your Care EveryWhere ID. This could be used by other organizations to access your [place] medical records  [id number]        Your Vitals Were     Pulse Temperature BMI (Body Mass Index)             73 98.2  F (36.8  C) (Oral) 41.02 kg/m2          Blood Pressure from Last 3 Encounters:   07/06/17 131/72   06/06/17 128/82   05/22/17 130/88    Weight from Last 3 Encounters:   07/06/17 265 lb 12.8 oz (120.6 kg)   06/06/17 267 lb (121.1 kg)   05/22/17 265 lb (120.2 kg)              We Performed the Following     ORTHO  REFERRAL        Primary Care Provider Office Phone # Fax #    JOANN Dorsey [phone]       [facility] [address]  [facility] [address]        Equal Access to Services     AYE DUNCAN : Hadii aad ku hadgloriaperry Larry, wajennifercourtney morankelseyha, susandiallo lingmakyle johnson. So [facility] [phone].    ATENCIÓN: Si habla español, tiene a cohoa disposición servicios gratuitos de asistencia lingüística. [facility] [phone].    We comply with applicable federal civil rights laws and [place] laws. We do not discriminate on the basis of race, color, national origin, age, disability sex, sexual orientation or gender identity.            Thank you!     Thank you for choosing [facility]  for your care. Our goal is always to provide you with excellent care. Hearing back from our patients is one way we can continue to improve our services. Please take a few minutes to complete the written survey that you may receive in the mail after your visit with us. Thank you!             Your Updated Medication List - Protect others around you: Learn how to safely use, store and throw away your medicines at www.disposemymeds.org.          This list is accurate as of: 7/6/17  2:50 PM.  Always use your most recent med list.                   Brand Name Dispense Instructions for use Diagnosis    ADVIL 200 MG capsule   Generic drug:  ibuprofen      ONE TO TWO TABLETS EVERY 6-8 HOURS AS NEEDED        augmented betamethasone dipropionate 0.05 % cream    DIPROLENE-AF    50 g    Apply topically 2 times daily    Venous stasis dermatitis of right lower extremity       estradiol 0.5 MG tablet    ESTRACE    24 tablet    Place vaginally twice a week    Pessary maintenance       VITAMIN D (CHOLECALCIFEROL) PO      Take 1,000 Units by mouth daily

## 2017-07-06 NOTE — PATIENT INSTRUCTIONS
Plan:    Nerve conduction studies/ electromyogram.  Continue to use caution when walking: Make sure you always have your cane.   Return to clinic after the nerve conduction studies.

## 2017-07-12 ENCOUNTER — TRANSFERRED RECORDS (OUTPATIENT)
Dept: HEALTH INFORMATION MANAGEMENT | Facility: CLINIC | Age: 72
End: 2017-07-12

## 2017-07-19 ENCOUNTER — TELEPHONE (OUTPATIENT)
Dept: OBGYN | Facility: CLINIC | Age: 72
End: 2017-07-19

## 2017-07-19 NOTE — TELEPHONE ENCOUNTER
Panel Management Review      Patient has the following on her problem list: None      Composite cancer screening  Chart review shows that this patient is due/due soon for the following Mammogram  Summary:    Patient is due/failing the following:   MAMMOGRAM    Action needed:   Patient needs office visit for Mammogram.    Type of outreach:    Sent [employer] message.    Questions for provider review:    None                                                                                                                                    HORACIO SALCEDO MA       Chart routed to none .

## 2017-07-20 ENCOUNTER — TELEPHONE (OUTPATIENT)
Dept: OBGYN | Facility: CLINIC | Age: 72
End: 2017-07-20

## 2017-07-20 ENCOUNTER — OFFICE VISIT (OUTPATIENT)
Dept: OBGYN | Facility: CLINIC | Age: 72
End: 2017-07-20
Payer: COMMERCIAL

## 2017-07-20 VITALS
HEART RATE: 74 BPM | DIASTOLIC BLOOD PRESSURE: 71 MMHG | HEIGHT: 68 IN | BODY MASS INDEX: 40.19 KG/M2 | SYSTOLIC BLOOD PRESSURE: 136 MMHG | WEIGHT: 265.2 LBS

## 2017-07-20 DIAGNOSIS — Z46.89 PESSARY MAINTENANCE: ICD-10-CM

## 2017-07-20 DIAGNOSIS — N81.3 UTEROVAGINAL PROLAPSE, COMPLETE: Primary | ICD-10-CM

## 2017-07-20 PROCEDURE — 99213 OFFICE O/P EST LOW 20 MIN: CPT | Performed by: OBSTETRICS & GYNECOLOGY

## 2017-07-20 RX ORDER — ESTRADIOL 0.5 MG/1
TABLET ORAL
Qty: 24 TABLET | Refills: 3 | Status: SHIPPED | OUTPATIENT
Start: 2017-07-20 | End: 2018-06-18

## 2017-07-20 NOTE — NURSING NOTE
"Chief Complaint   Patient presents with     RECHECK     pessary check       Initial /71 (BP Location: Left arm, Patient Position: Chair, Cuff Size: Adult Large)  Pulse 74  Ht 5' 7.5\" (1.715 m)  Wt 265 lb 3.2 oz (120.3 kg)  BMI 40.92 kg/m2 Estimated body mass index is 40.92 kg/(m^2) as calculated from the following:    Height as of this encounter: 5' 7.5\" (1.715 m).    Weight as of this encounter: 265 lb 3.2 oz (120.3 kg).  Medication Reconciliation: complete     Sandra Zaidi CMA      "

## 2017-07-20 NOTE — TELEPHONE ENCOUNTER
Prior Authorization for this patient - Will complete letter and wait for response    Alivia Behrendt  Specialty CSS

## 2017-07-20 NOTE — PROGRESS NOTES
Melania is a 71 year old  female who presents for pessary recheck;  she currently has a gellhorn pessary which she keeps in place continuous;  she reports no problems with her pessary, no discharge, no bleeding.. She is using vaginal estrogen once a week.     ROS: 10 point ROS neg other than the symptoms noted above in the HPI.  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 Difficulty with stairs, getting up from chairs, weakness in LE's but more so in the left.       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 Cunningham, [place], [facility]       Current Outpatient Prescriptions   Medication Sig Dispense Refill     estradiol (ESTRACE) 0.5 MG tablet Place vaginally twice a week 24 tablet 3     augmented betamethasone dipropionate (DIPROLENE-AF) 0.05 % cream Apply topically 2 times daily 50 g 0     VITAMIN D, CHOLECALCIFEROL, PO Take 1,000 Units by mouth daily       ADVIL 200 MG PO CAPS ONE TO TWO TABLETS EVERY 6-8 HOURS AS NEEDED  0     [DISCONTINUED] estradiol (ESTRACE) 0.5 MG tablet Place vaginally twice a week 24 tablet 3       Social History     Social History     Marital status: Single     Spouse name: N/A     Number of children: N/A     Years of education: N/A     Social History Main Topics     Smoking status: Never Smoker     Smokeless tobacco: Never Used     Alcohol use No     Drug use: No     Sexual activity: No     Other Topics Concern     None     Social  "History Narrative       Family History   Problem Relation Age of Onset     Hypertension Mother      Musculoskeletal Disorder Mother      CMT- Charcot Emilee Tooth     CEREBROVASCULAR DISEASE Father      Respiratory Brother      Emphysema     Musculoskeletal Disorder Daughter      CMT- Charcot Emilee Tooth       OBJECTIVE: /71 (BP Location: Left arm, Patient Position: Chair, Cuff Size: Adult Large)  Pulse 74  Ht 5' 7.5\" (1.715 m)  Wt 265 lb 3.2 oz (120.3 kg)  BMI 40.92 kg/m2  No LMP recorded. Patient is postmenopausal.  The pessary was removed with no  difficulty, cleaned in warm water and  replaced, after inspection of the vulva and vagina, which showed normal mucosa.    ASSESSMENT / PLAN:  (Z46.89) Pessary maintenance  Comment: normal exam  Plan: estradiol (ESTRACE) 0.5 MG tablet        Refilled, risks/benefits/altneratives discussed, accepted a trial of the trimosan  Kristi Merchant MD        "

## 2017-07-20 NOTE — MR AVS SNAPSHOT
After Visit Summary   7/20/2017    Melania Alicea    MRN: [id number]           Patient Information     Date Of Birth          1945        Visit Information        Provider Department      7/20/2017 10:15 AM Kristi Merchant MD [facility]        Today's Diagnoses     Pessary maintenance           Follow-ups after your visit        Your next 10 appointments already scheduled     Aug 24, 2017 11:00 AM CDT   Return Visit with Vanessa Yap MD   [facility]    [address] [facility] [address] [phone]              Who to contact     If you have questions or need follow up information about today's clinic visit or your schedule please contact [facility] directly at [phone].  Normal or non-critical lab and imaging results will be communicated to you by MyChart, letter or phone within 4 business days after the clinic has received the results. If you do not hear from us within 7 days, please contact the clinic through MyChart or phone. If you have a critical or abnormal lab result, we will notify you by phone as soon as possible.  Submit refill requests through [employer] or call your pharmacy and they will forward the refill request to us. Please allow 3 business days for your refill to be completed.          Additional Information About Your Visit        MyChart Information     [employer] gives you secure access to your electronic health record. If you see a primary care provider, you can also send messages to your care team and make appointments. If you have questions, please call your primary care clinic.  If you do not have a primary care provider, please call [phone] and they will assist you.        Care EveryWhere ID     This is your Care EveryWhere ID. This could be used by other organizations to access your [place] medical records  [id number]        Your Vitals Were     Pulse Height BMI  "(Body Mass Index)             74 5' 7.5\" (1.715 m) 40.92 kg/m2          Blood Pressure from Last 3 Encounters:   07/20/17 136/71   07/06/17 131/72   06/06/17 128/82    Weight from Last 3 Encounters:   07/20/17 265 lb 3.2 oz (120.3 kg)   07/06/17 265 lb 12.8 oz (120.6 kg)   06/06/17 267 lb (121.1 kg)              Today, you had the following     No orders found for display         Where to get your medicines      These medications were sent to [facility] - [place], [place] - [address]  [address] [place] [address]     Phone:  [phone]     estradiol 0.5 MG tablet          Primary Care Provider Office Phone # Fax #    Betty Hebert JOANN Arenas [facility] [phone]       [facility] [address] [place] [address]        Equal Access to Services     ALICIA DUNCAN : Hadii rashad ku hadasho Soomaali, waaxda luqadaha, qaybta kaalmada adeegyada, kyle pederson hayliliam ling . So [facility] [phone].    ATENCIÓN: Si habla español, tiene a ochoa disposición servicios gratuitos de asistencia lingüística. Llame al [phone].    We comply with applicable federal civil rights laws and [place] laws. We do not discriminate on the basis of race, color, national origin, age, disability sex, sexual orientation or gender identity.            Thank you!     Thank you for choosing [facility]  for your care. Our goal is always to provide you with excellent care. Hearing back from our patients is one way we can continue to improve our services. Please take a few minutes to complete the written survey that you may receive in the mail after your visit with us. Thank you!             Your Updated Medication List - Protect others around you: Learn how to safely use, store and throw away your medicines at www.disposemymeds.org.          This list is accurate as of: 7/20/17 12:20 PM.  Always use your most recent med list.                   Brand Name Dispense Instructions for use Diagnosis "    ADVIL 200 MG capsule   Generic drug:  ibuprofen      ONE TO TWO TABLETS EVERY 6-8 HOURS AS NEEDED        augmented betamethasone dipropionate 0.05 % cream    DIPROLENE-AF    50 g    Apply topically 2 times daily    Venous stasis dermatitis of right lower extremity       estradiol 0.5 MG tablet    ESTRACE    24 tablet    Place vaginally twice a week    Pessary maintenance       VITAMIN D (CHOLECALCIFEROL) PO      Take 1,000 Units by mouth daily

## 2017-07-24 RX ORDER — ESTRADIOL 10 UG/1
10 INSERT VAGINAL
Qty: 26 TABLET | Refills: 3 | Status: SHIPPED | OUTPATIENT
Start: 2017-07-24 | End: 2017-08-24 | Stop reason: ALTCHOICE

## 2017-07-24 NOTE — TELEPHONE ENCOUNTER
PA for Estradiol has been denied, do you want to prescribe Vagifem?  This RX is on the patient formulary list.      Please advise -    Risa Soria  Clinic Station

## 2017-07-24 NOTE — TELEPHONE ENCOUNTER
Please put in for vagifem   Thanks   Kristi Merchant MD     Please review prescription in cue and complete and sign.  Verify directions, quantity, refills etc.    Yael Lira   Ob/Gyn Clinic  RN

## 2017-08-07 ENCOUNTER — MYC MEDICAL ADVICE (OUTPATIENT)
Dept: NEUROLOGY | Facility: CLINIC | Age: 72
End: 2017-08-07

## 2017-08-07 ENCOUNTER — TELEPHONE (OUTPATIENT)
Dept: NEUROLOGY | Facility: CLINIC | Age: 72
End: 2017-08-07

## 2017-08-07 DIAGNOSIS — G62.9 PERIPHERAL POLYNEUROPATHY: Primary | ICD-10-CM

## 2017-08-07 DIAGNOSIS — E07.9 THYROID DYSFUNCTION: ICD-10-CM

## 2017-08-07 DIAGNOSIS — E61.0 COPPER DEFICIENCY: ICD-10-CM

## 2017-08-07 NOTE — TELEPHONE ENCOUNTER
Please let Melania know that there is evidence of neuropathy on her nerve conduction studies. I recommend some additional blood work. Please let me know if she prefers doing this before her follow-up in a couple of weeks, or after discussion with me in clinic.     Thank you,  Vanessa Yap

## 2017-08-07 NOTE — TELEPHONE ENCOUNTER
Ms. Alicea would like to have her labs drawn on 8/9, before her appointment.  I've advised her to be fasting for possible B12 testing.

## 2017-08-09 ENCOUNTER — OFFICE VISIT (OUTPATIENT)
Dept: FAMILY MEDICINE | Facility: CLINIC | Age: 72
End: 2017-08-09
Payer: COMMERCIAL

## 2017-08-09 VITALS
DIASTOLIC BLOOD PRESSURE: 69 MMHG | TEMPERATURE: 97.2 F | OXYGEN SATURATION: 99 % | RESPIRATION RATE: 14 BRPM | BODY MASS INDEX: 41.51 KG/M2 | HEART RATE: 66 BPM | WEIGHT: 269 LBS | SYSTOLIC BLOOD PRESSURE: 139 MMHG

## 2017-08-09 DIAGNOSIS — I87.2 VENOUS STASIS DERMATITIS OF RIGHT LOWER EXTREMITY: ICD-10-CM

## 2017-08-09 DIAGNOSIS — E07.9 THYROID DYSFUNCTION: ICD-10-CM

## 2017-08-09 DIAGNOSIS — G62.9 PERIPHERAL POLYNEUROPATHY: ICD-10-CM

## 2017-08-09 LAB
ALBUMIN SERPL-MCNC: 3.6 G/DL (ref 3.4–5)
ALP SERPL-CCNC: 95 U/L (ref 40–150)
ALT SERPL W P-5'-P-CCNC: 17 U/L (ref 0–50)
ANION GAP SERPL CALCULATED.3IONS-SCNC: 6 MMOL/L (ref 3–14)
AST SERPL W P-5'-P-CCNC: 12 U/L (ref 0–45)
BILIRUB SERPL-MCNC: 0.5 MG/DL (ref 0.2–1.3)
BUN SERPL-MCNC: 14 MG/DL (ref 7–30)
CALCIUM SERPL-MCNC: 9 MG/DL (ref 8.5–10.1)
CHLORIDE SERPL-SCNC: 106 MMOL/L (ref 94–109)
CO2 SERPL-SCNC: 26 MMOL/L (ref 20–32)
CREAT SERPL-MCNC: 0.6 MG/DL (ref 0.52–1.04)
CRP SERPL-MCNC: 5 MG/L (ref 0–8)
ERYTHROCYTE [DISTWIDTH] IN BLOOD BY AUTOMATED COUNT: 14.9 % (ref 10–15)
ERYTHROCYTE [SEDIMENTATION RATE] IN BLOOD BY WESTERGREN METHOD: 16 MM/H (ref 0–30)
FOLATE SERPL-MCNC: 30 NG/ML
GFR SERPL CREATININE-BSD FRML MDRD: NORMAL ML/MIN/1.7M2
GLUCOSE SERPL-MCNC: 87 MG/DL (ref 70–99)
HCT VFR BLD AUTO: 40.5 % (ref 35–47)
HGB BLD-MCNC: 13.2 G/DL (ref 11.7–15.7)
MCH RBC QN AUTO: 29.1 PG (ref 26.5–33)
MCHC RBC AUTO-ENTMCNC: 32.6 G/DL (ref 31.5–36.5)
MCV RBC AUTO: 89 FL (ref 78–100)
PLATELET # BLD AUTO: 216 10E9/L (ref 150–450)
POTASSIUM SERPL-SCNC: 4 MMOL/L (ref 3.4–5.3)
PROT SERPL-MCNC: 6.9 G/DL (ref 6.8–8.8)
RBC # BLD AUTO: 4.53 10E12/L (ref 3.8–5.2)
SODIUM SERPL-SCNC: 138 MMOL/L (ref 133–144)
TSH SERPL DL<=0.005 MIU/L-ACNC: 3.34 MU/L (ref 0.4–4)
VIT B12 SERPL-MCNC: 289 PG/ML (ref 193–986)
WBC # BLD AUTO: 5.9 10E9/L (ref 4–11)

## 2017-08-09 PROCEDURE — 82607 VITAMIN B-12: CPT | Performed by: PSYCHIATRY & NEUROLOGY

## 2017-08-09 PROCEDURE — 85652 RBC SED RATE AUTOMATED: CPT | Performed by: PSYCHIATRY & NEUROLOGY

## 2017-08-09 PROCEDURE — 85027 COMPLETE CBC AUTOMATED: CPT | Performed by: PSYCHIATRY & NEUROLOGY

## 2017-08-09 PROCEDURE — 84443 ASSAY THYROID STIM HORMONE: CPT | Performed by: PSYCHIATRY & NEUROLOGY

## 2017-08-09 PROCEDURE — 84425 ASSAY OF VITAMIN B-1: CPT | Mod: 90 | Performed by: PSYCHIATRY & NEUROLOGY

## 2017-08-09 PROCEDURE — 84207 ASSAY OF VITAMIN B-6: CPT | Mod: 90 | Performed by: PSYCHIATRY & NEUROLOGY

## 2017-08-09 PROCEDURE — 86038 ANTINUCLEAR ANTIBODIES: CPT | Performed by: PSYCHIATRY & NEUROLOGY

## 2017-08-09 PROCEDURE — 86235 NUCLEAR ANTIGEN ANTIBODY: CPT | Performed by: PSYCHIATRY & NEUROLOGY

## 2017-08-09 PROCEDURE — 86140 C-REACTIVE PROTEIN: CPT | Performed by: PSYCHIATRY & NEUROLOGY

## 2017-08-09 PROCEDURE — 80053 COMPREHEN METABOLIC PANEL: CPT | Performed by: PSYCHIATRY & NEUROLOGY

## 2017-08-09 PROCEDURE — 84165 PROTEIN E-PHORESIS SERUM: CPT | Performed by: PSYCHIATRY & NEUROLOGY

## 2017-08-09 PROCEDURE — 82746 ASSAY OF FOLIC ACID SERUM: CPT | Performed by: PSYCHIATRY & NEUROLOGY

## 2017-08-09 PROCEDURE — 36415 COLL VENOUS BLD VENIPUNCTURE: CPT | Performed by: PSYCHIATRY & NEUROLOGY

## 2017-08-09 PROCEDURE — 00000402 ZZHCL STATISTIC TOTAL PROTEIN: Performed by: PSYCHIATRY & NEUROLOGY

## 2017-08-09 PROCEDURE — 99214 OFFICE O/P EST MOD 30 MIN: CPT | Performed by: NURSE PRACTITIONER

## 2017-08-09 PROCEDURE — 83921 ORGANIC ACID SINGLE QUANT: CPT | Mod: 90 | Performed by: PSYCHIATRY & NEUROLOGY

## 2017-08-09 PROCEDURE — 99000 SPECIMEN HANDLING OFFICE-LAB: CPT | Performed by: PSYCHIATRY & NEUROLOGY

## 2017-08-09 RX ORDER — BETAMETHASONE DIPROPIONATE 0.5 MG/G
CREAM TOPICAL 2 TIMES DAILY
Qty: 50 G | Refills: 11 | Status: SHIPPED | OUTPATIENT
Start: 2017-08-09 | End: 2019-02-19

## 2017-08-09 ASSESSMENT — PAIN SCALES - GENERAL: PAINLEVEL: NO PAIN (0)

## 2017-08-09 NOTE — PROGRESS NOTES
SUBJECTIVE:                                                    Melania Alicea is a 71 year old female who presents to clinic today for the following health issues:      RECHECK LEG       Duration: ongoing     Description (location/character/radiation): right leg     Intensity:  mild    Accompanying signs and symptoms: doing much better     History (similar episodes/previous evaluation): Venous stasis dermatitis    Precipitating or alleviating factors: None    Therapies tried and outcome: augmented betamethasone dipropionate (DIPROLENE-AF) 0.05 % cream     RLE   EMG done thru [facility] on 7/17 Dr Yap has results and has follow up appt 8/24/3017.       -------------------------------------    Problem list and histories reviewed & adjusted, as indicated.  Additional history: as documented    Labs reviewed in EPIC    Reviewed and updated as needed this visit by clinical staffAllergies       Reviewed and updated as needed this visit by Provider         ROS:  C: NEGATIVE for fever, chills, change in weight  E/M: NEGATIVE for ear, mouth and throat problems  R: NEGATIVE for significant cough or SOB  CV: NEGATIVE for chest pain, palpitations or peripheral edema  MUSCULOSKELETAL: RLE dermatitis   ROS otherwise negative    OBJECTIVE:                                                    /69  Pulse 66  Temp 97.2  F (36.2  C) (Tympanic)  Resp 14  Wt 269 lb (122 kg)  SpO2 99%  BMI 41.51 kg/m2  Body mass index is 41.51 kg/(m^2).   GENERAL: healthy, alert, well nourished, well hydrated, no distress  HENT: ear canals- normal; TMs- normal; Nose- normal; Mouth- no ulcers, no lesions  NECK: no tenderness, no adenopathy, no asymmetry, no masses, no stiffness; thyroid- normal to palpation  RESP: lungs clear to auscultation - no rales, no rhonchi, no wheezes  CV: regular rates and rhythm, normal S1 S2, no S3 or S4 and no murmur, no click or rub -  ABDOMEN: soft, no tenderness, no  hepatosplenomegaly, no masses, normal  bowel sounds  MS: extremities- no gross deformities noted, bilateral LE improved. edema  No compression stocking in place today.  Small amount of dermatitis present on exam.   Improved from previous.   Diagnostic test results:  Results for orders placed or performed in visit on 06/06/17   Wound Culture Aerobic Bacterial   Result Value Ref Range    Specimen Description Leg Wound     Culture Micro Moderate growth Normal skin avril     Micro Report Status FINAL 06/08/2017    Gram stain   Result Value Ref Range    Specimen Description Leg Wound     Gram Stain No organisms seen  No WBCs seen       Micro Report Status FINAL 06/07/2017           ASSESSMENT/PLAN:                                                    1. Venous stasis dermatitis of right lower extremity  Continue LE compression stocking  Refilled topical steroid today.   No wound culture needed today.   - augmented betamethasone dipropionate (DIPROLENE-AF) 0.05 % cream; Apply topically 2 times daily  Dispense: 50 g; Refill: 11      Follow up with Provider - Call or return to the clinic with any worsening of symptoms or no resolution. Patient/Parent verbalized understanding and is in agreement. Medication side effects reviewed.   Current Outpatient Prescriptions   Medication Sig Dispense Refill     augmented betamethasone dipropionate (DIPROLENE-AF) 0.05 % cream Apply topically 2 times daily 50 g 11     estradiol (ESTRACE) 0.5 MG tablet Place vaginally twice a week 24 tablet 3     VITAMIN D, CHOLECALCIFEROL, PO Take 1,000 Units by mouth daily       ADVIL 200 MG PO CAPS ONE TO TWO TABLETS EVERY 6-8 HOURS AS NEEDED  0     estradiol (VAGIFEM) 10 MCG TABS vaginal tablet Place 1 tablet (10 mcg) vaginally twice a week 26 tablet 3      Follow up with neurology as planned.   See Patient Instructions    JO[facility]

## 2017-08-09 NOTE — MR AVS SNAPSHOT
After Visit Summary   8/9/2017    Melania Alicea    MRN: [id number]           Patient Information     Date Of Birth          1945        Visit Information        Provider Department      8/9/2017 9:20 AM Betty Arenas APRN [facility]        Today's Diagnoses     Venous stasis dermatitis of right lower extremity          Care Instructions      Understanding Chronic Venous Insufficiency  Problems with the veins in the legs may lead to chronic venous insufficiency (CVI). CVI means that there is a long-term problem with the veins not being able to pump blood back to your heart. When this happens, blood stays in the legs and causes swelling and aching.   Two problems that may lead to chronic venous insufficiency are:    Damaged valves. Valves keep blood flowing from the legs through the blood vessels and back to the heart. When the valves are damaged, blood does not flow as well.     Deep vein thrombosis (DVT). Blood clots may form in the deep veins of the legs. This may cause pain, redness, and swelling in the legs. It may also block the flow of blood back to the heart. Seek immediate medical care if you have these symptoms.    A blood clot in the leg can also break off and travel to the lungs. This is called pulmonary embolism (PE). In the lungs, the clot can cut off the flow of blood. This may cause chest pain, trouble breathing, sweating, a fast heartbeat, coughing (may cough up blood), and fainting. It is a medical emergency and may cause death. Call 911 if you have these symptoms.    Healthcare providers call the two conditions, DVT and PE, venous thromboembolism (VTE).  CVI can t be cured, but you can control leg swelling to reduce the likelihood of ulcers (sores).  Recognizing the symptoms  Be aware of the following:    If you stand or sit with your feet down for long periods, your legs may ache or feel heavy.    Swollen ankles are possibly the most common symptom of  CVI.    As swelling increases, the skin over your ankles may show red spots or a brownish tinge. The skin may feel leathery or scaly, and may start to itch.    If swelling is not controlled, an ulcer (open wound) may form.  What you can do  Reduce your risk of developing ulcers by doing the following:    Increase blood flow back to your heart by elevating your legs, exercising daily, and wearing elastic stockings.    Boost blood flow in your legs by losing excess weight.    If you must stand or sit in one place for a period of time, keep your blood moving by wiggling your toes, shifting your body position, and rising up on the balls of your feet.    Date Last Reviewed: 5/1/2016 2000-2017 [employer]. [address] [place] [address]. All rights reserved. This information is not intended as a substitute for professional medical care. Always follow your healthcare professional's instructions.                Follow-ups after your visit        Your next 10 appointments already scheduled     Aug 24, 2017 11:00 AM CDT   Return Visit with Vanessa Yap MD   [facility]    [address] [place] [id number] [phone]              Who to contact     If you have questions or need follow up information about today's clinic visit or your schedule please contact [facility] directly at [phone].  Normal or non-critical lab and imaging results will be communicated to you by MyChart, letter or phone within 4 business days after the clinic has received the results. If you do not hear from us within 7 days, please contact the clinic through MyChart or phone. If you have a critical or abnormal lab result, we will notify you by phone as soon as possible.  Submit refill requests through Airbrite or call your pharmacy and they will forward the refill request to us. Please allow 3 business days for your refill to be completed.           Additional Information About Your Visit        MyChart Information     [employer] gives you secure access to your electronic health record. If you see a primary care provider, you can also send messages to your care team and make appointments. If you have questions, please call your primary care clinic.  If you do not have a primary care provider, please call [phone] and they will assist you.        Care EveryWhere ID     This is your Care EveryWhere ID. This could be used by other organizations to access your [facility] medical records  [id number]        Your Vitals Were     Pulse Temperature Respirations Pulse Oximetry BMI (Body Mass Index)       66 97.2  F (36.2  C) (Tympanic) 14 99% 41.51 kg/m2        Blood Pressure from Last 3 Encounters:   08/09/17 139/69   07/20/17 136/71   07/06/17 131/72    Weight from Last 3 Encounters:   08/09/17 269 lb (122 kg)   07/20/17 265 lb 3.2 oz (120.3 kg)   07/06/17 265 lb 12.8 oz (120.6 kg)              Today, you had the following     No orders found for display         Where to get your medicines      These medications were sent to [facility] - [place], [place] - [address]  [address] [place] [address]     Phone:  [phone]     augmented betamethasone dipropionate 0.05 % cream          Primary Care Provider Office Phone # Fax #    Bettykari KleinJOANN Maldonado [facility] [phone]       [address] [place] [address]        Equal Access to Services     ALICIA DUNCAN : Hadii rashad barton hadasho [place]ch[place]ali, waaxda luqadaha, qaybta kaalmada kyle mart . So [facility] [phone].    ATENCIÓN: Si habla español, tiene a ochoa disposición servicios gratuitos de asistencia lingüística. Llbenito al [phone].    We comply with applicable federal civil rights laws and [place] laws. We do not discriminate on the basis of race, color, national origin, age, disability sex, sexual orientation or gender identity.            Thank you!     Thank  you for choosing [facility]  for your [facility]. Our goal is always to provide you with excellent care. Hearing back from our patients is one way we can continue to improve our services. Please take a few minutes to complete the written survey that you may receive in the mail after your visit with us. Thank you!             Your Updated Medication List - Protect others around you: Learn how to safely use, store and throw away your medicines at www.disposemymeds.org.          This list is accurate as of: 8/9/17  9:47 AM.  Always use your most recent med list.                   Brand Name Dispense Instructions for use Diagnosis    ADVIL 200 MG capsule   Generic drug:  ibuprofen      ONE TO TWO TABLETS EVERY 6-8 HOURS AS NEEDED        augmented betamethasone dipropionate 0.05 % cream    DIPROLENE-AF    50 g    Apply topically 2 times daily    Venous stasis dermatitis of right lower extremity       estradiol 0.5 MG tablet    ESTRACE    24 tablet    Place vaginally twice a week    Pessary maintenance       estradiol 10 MCG Tabs vaginal tablet    VAGIFEM    26 tablet    Place 1 tablet (10 mcg) vaginally twice a week    Uterovaginal prolapse, complete       VITAMIN D (CHOLECALCIFEROL) PO      Take 1,000 Units by mouth daily

## 2017-08-09 NOTE — LETTER
August 9, 2017        To Whom it may Concern;          Melania Alicea is unable to walk to get her mail on a daily basis due to ongoing health conditions. 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           Betty Arenas [facility]  [phone]  [address] [address]

## 2017-08-09 NOTE — PATIENT INSTRUCTIONS
Understanding Chronic Venous Insufficiency  Problems with the veins in the legs may lead to chronic venous insufficiency (CVI). CVI means that there is a long-term problem with the veins not being able to pump blood back to your heart. When this happens, blood stays in the legs and causes swelling and aching.   Two problems that may lead to chronic venous insufficiency are:    Damaged valves. Valves keep blood flowing from the legs through the blood vessels and back to the heart. When the valves are damaged, blood does not flow as well.     Deep vein thrombosis (DVT). Blood clots may form in the deep veins of the legs. This may cause pain, redness, and swelling in the legs. It may also block the flow of blood back to the heart. Seek immediate medical care if you have these symptoms.    A blood clot in the leg can also break off and travel to the lungs. This is called pulmonary embolism (PE). In the lungs, the clot can cut off the flow of blood. This may cause chest pain, trouble breathing, sweating, a fast heartbeat, coughing (may cough up blood), and fainting. It is a medical emergency and may cause death. Call 911 if you have these symptoms.    Healthcare providers call the two conditions, DVT and PE, venous thromboembolism (VTE).  CVI can t be cured, but you can control leg swelling to reduce the likelihood of ulcers (sores).  Recognizing the symptoms  Be aware of the following:    If you stand or sit with your feet down for long periods, your legs may ache or feel heavy.    Swollen ankles are possibly the most common symptom of CVI.    As swelling increases, the skin over your ankles may show red spots or a brownish tinge. The skin may feel leathery or scaly, and may start to itch.    If swelling is not controlled, an ulcer (open wound) may form.  What you can do  Reduce your risk of developing ulcers by doing the following:    Increase blood flow back to your heart by elevating your legs, exercising daily,  and wearing elastic stockings.    Boost blood flow in your legs by losing excess weight.    If you must stand or sit in one place for a period of time, keep your blood moving by wiggling your toes, shifting your body position, and rising up on the balls of your feet.    Date Last Reviewed: 5/1/2016 2000-2017 [employer]. [address] [place] [address], [place] [address]. All rights reserved. This information is not intended as a substitute for professional medical care. Always follow your healthcare professional's instructions.

## 2017-08-10 LAB
ALBUMIN SERPL ELPH-MCNC: 3.7 G/DL (ref 3.7–5.1)
ALPHA1 GLOB SERPL ELPH-MCNC: 0.3 G/DL (ref 0.2–0.4)
ALPHA2 GLOB SERPL ELPH-MCNC: 0.9 G/DL (ref 0.5–0.9)
ANA SER QL IA: NORMAL
B-GLOBULIN SERPL ELPH-MCNC: 0.9 G/DL (ref 0.6–1)
ENA SS-A IGG SER IA-ACNC: NORMAL AI (ref 0–0.9)
ENA SS-B IGG SER IA-ACNC: NORMAL AI (ref 0–0.9)
GAMMA GLOB SERPL ELPH-MCNC: 0.7 G/DL (ref 0.7–1.6)
M PROTEIN SERPL ELPH-MCNC: 0 G/DL
PROT PATTERN SERPL ELPH-IMP: NORMAL

## 2017-08-11 LAB — METHYLMALONATE SERPL-SCNC: 0.22

## 2017-08-12 LAB
VIT B1 BLD-MCNC: 97 UG/DL
VIT B6 SERPL-MCNC: 18.7 NG/ML

## 2017-08-14 NOTE — PROGRESS NOTES
Please advise Melania Alicea,  1945, that her lab results were normal except for low B12 and B6. I recommend she at least start a B complex vitamin and add cyanocobalamin 1000mcg daily.   [phone] (home)   Vanessa Yap

## 2017-08-17 NOTE — PROGRESS NOTES
Final visit with Physical Therapy not completed. Outcomes stated based on last completed visit. Pt did not follow up with further treatment with [facility] and therefore discharging this plan of care.    Thank you for the referral,  Raven Clifton, PT, DPT

## 2017-08-24 ENCOUNTER — OFFICE VISIT (OUTPATIENT)
Dept: NEUROLOGY | Facility: CLINIC | Age: 72
End: 2017-08-24
Payer: COMMERCIAL

## 2017-08-24 VITALS
BODY MASS INDEX: 42.13 KG/M2 | OXYGEN SATURATION: 96 % | DIASTOLIC BLOOD PRESSURE: 76 MMHG | WEIGHT: 273 LBS | SYSTOLIC BLOOD PRESSURE: 145 MMHG | HEART RATE: 73 BPM

## 2017-08-24 DIAGNOSIS — G62.9 SENSORY NEUROPATHY: Primary | ICD-10-CM

## 2017-08-24 LAB — CK SERPL-CCNC: 78 U/L (ref 30–225)

## 2017-08-24 PROCEDURE — 82550 ASSAY OF CK (CPK): CPT | Performed by: PSYCHIATRY & NEUROLOGY

## 2017-08-24 PROCEDURE — 36415 COLL VENOUS BLD VENIPUNCTURE: CPT | Performed by: PSYCHIATRY & NEUROLOGY

## 2017-08-24 PROCEDURE — 99215 OFFICE O/P EST HI 40 MIN: CPT | Performed by: PSYCHIATRY & NEUROLOGY

## 2017-08-24 ASSESSMENT — PAIN SCALES - GENERAL: PAINLEVEL: EXTREME PAIN (9)

## 2017-08-24 NOTE — PATIENT INSTRUCTIONS
Plan:    Labs today. We will notify you of the results.   Consider physical therapy for balance and strengthening. Stay active as much as you are able.   Consider Neuropathy clinic referral for second opinion regarding CMT.   B12 supplement: 1000mcg daily / B complex.  Return to clinic in 6 months.

## 2017-08-24 NOTE — MR AVS SNAPSHOT
After Visit Summary   8/24/2017    Melania Alicea    MRN: [id number]           Patient Information     Date Of Birth          1945        Visit Information        Provider Department      8/24/2017 11:00 AM Vanessa Yap MD [facility]        Today's Diagnoses     Sensory neuropathy (H)    -  1      Care Instructions    Plan:    Labs today. We will notify you of the results.   Consider physical therapy for balance and strengthening. Stay active as much as you are able.   Consider Neuropathy clinic referral for second opinion regarding CMT.   B12 supplement: 1000mcg daily.   Return to clinic in 6 months.           Follow-ups after your visit        Follow-up notes from your care team     Return in about 6 months (around 2/24/2018).      Who to contact     If you have questions or need follow up information about today's clinic visit or your schedule please contact [facility] directly at [phone].  Normal or non-critical lab and imaging results will be communicated to you by A [employer] Luckyhart, letter or phone within 4 business days after the clinic has received the results. If you do not hear from us within 7 days, please contact the clinic through [employer] Lucky[employer] or phone. If you have a critical or abnormal lab result, we will notify you by phone as soon as possible.  Submit refill requests through [employer] or call your pharmacy and they will forward the refill request to us. Please allow 3 business days for your refill to be completed.          Additional Information About Your Visit        MyChart Information     [employer] gives you secure access to your electronic health record. If you see a primary care provider, you can also send messages to your care team and make appointments. If you have questions, please call your primary care clinic.  If you do not have a primary care provider, please call [phone] and they will assist you.        Care EveryWhere ID     This is your Care  EveryWhere ID. This could be used by other organizations to access your [facility] medical records  [id number]        Your Vitals Were     Pulse Pulse Oximetry BMI (Body Mass Index)             73 96% 42.13 kg/m2          Blood Pressure from Last 3 Encounters:   08/24/17 145/76   08/09/17 139/69   07/20/17 136/71    Weight from Last 3 Encounters:   08/24/17 273 lb (123.8 kg)   08/09/17 269 lb (122 kg)   07/20/17 265 lb 3.2 oz (120.3 kg)              We Performed the Following     CK total          Today's Medication Changes          These changes are accurate as of: 8/24/17 11:56 AM.  If you have any questions, ask your nurse or doctor.               Stop taking these medicines if you haven't already. Please contact your care team if you have questions.     estradiol 10 MCG Tabs vaginal tablet   Commonly known as:  VAGIFEM   Stopped by:  Vanessa Yap MD                    Primary Care Provider Office Phone # Fax #    JOANN Dorsey [facility] [phone]       [address] [place] [address]        Equal Access to Services     [facility]: Hadii rashad barton hadasho Sobipin, waaxda luqadaha, qaybta kaalmada brittny, kyle ling . So [facility] [phone].    ATENCIÓN: Si habla español, tiene a ochoa disposición servicios gratuitos de asistencia lingüística. Demarcus al [phone].    We comply with applicable federal civil rights laws and [place] laws. We do not discriminate on the basis of race, color, national origin, age, disability sex, sexual orientation or gender identity.            Thank you!     Thank you for choosing [facility]  for your care. Our goal is always to provide you with excellent care. Hearing back from our patients is one way we can continue to improve our services. Please take a few minutes to complete the written survey that you may receive in the mail after your visit with us. Thank you!             Your Updated Medication List -  Protect others around you: Learn how to safely use, store and throw away your medicines at www.disposemymeds.org.          This list is accurate as of: 8/24/17 11:56 AM.  Always use your most recent med list.                   Brand Name Dispense Instructions for use Diagnosis    ADVIL 200 MG capsule   Generic drug:  ibuprofen      ONE TO TWO TABLETS EVERY 6-8 HOURS AS NEEDED        augmented betamethasone dipropionate 0.05 % cream    DIPROLENE-AF    50 g    Apply topically 2 times daily    Venous stasis dermatitis of right lower extremity       estradiol 0.5 MG tablet    ESTRACE    24 tablet    Place vaginally twice a week    Pessary maintenance       VITAMIN D (CHOLECALCIFEROL) PO      Take 1,000 Units by mouth daily

## 2017-08-24 NOTE — NURSING NOTE
"Chief Complaint   Patient presents with     RECHECK     CMT       Initial /76 (BP Location: Right arm, Patient Position: Chair, Cuff Size: Adult Large)  Pulse 73  Wt 273 lb (123.8 kg)  SpO2 96%  BMI 42.13 kg/m2 Estimated body mass index is 42.13 kg/(m^2) as calculated from the following:    Height as of 7/20/17: 5' 7.5\" (1.715 m).    Weight as of this encounter: 273 lb (123.8 kg).  Medication Reconciliation: complete    Patient prefers to be contacted: Latasha Vasquez to leave detailed message on voicemail: n/a    Sylvia SEO-CMA    "

## 2017-08-24 NOTE — PROGRESS NOTES
ESTABLISHED PATIENT NEUROLOGY NOTE    DATE OF VISIT: 8/24/2017  MRN: [id number]  PATIENT NAME: Melania Alicea  YOB: 1945    Chief Complaint   Patient presents with     RECHECK     CMT     SUBJECTIVE:                                                      HISTORY OF PRESENT ILLNESS:  Melania is here for follow up regarding her electromyogram results. 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 She was complaining of some loss of balance, paresthesias/pain and leg weakness for many years.      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 Motor nerve studies and electromyogram were normal. Labs for peripheral neuropathy were unremarkable except for a low-normal B12 level and slightly low B6 (probably reflective of recent dietary intake).     Today, the patient tells me that her only new problem is Right thumb pain. Otherwise no new concerns. She says she used to see a rheumatologist years ago. She has been told that the lesion on her Left shin is psoriasis. She continues to have some redness in the Right calf since the cellulitis. Usually, she wears compression stockings. The patient again tells me that she has not noticed any muscle bulk loss. She does have tightness in the hamstrings with walking at first when getting up. This gets better once she gets moving. No exposure to hepatitis, HIV. 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     She says that what bothers her the most is pain and balance problems with walking. She has aching in the lower calves and feet. She has some burning pain in the feet as well. She has trouble with walking on vinyl floors and finding comfortable shoes - again, the aching is in the muscles of the legs. Occasional similar pain in the  arms, but this is not predominant    She says she did physical therapy for CMT in the past and was better in terms of strength and balance. She stopped the home exercises when she developed the cellulitis in her leg. She does ride a recumbent bike.       Please see my consultation note from 7.6.17 for additional historical details.       CURRENT MEDICATIONS:     Current Outpatient Prescriptions on File Prior to Visit:  augmented betamethasone dipropionate (DIPROLENE-AF) 0.05 % cream Apply topically 2 times daily   estradiol (ESTRACE) 0.5 MG tablet Place vaginally twice a week   VITAMIN D, CHOLECALCIFEROL, PO Take 1,000 Units by mouth daily   ADVIL 200 MG PO CAPS ONE TO TWO TABLETS EVERY 6-8 HOURS AS NEEDED     No current facility-administered medications on file prior to visit.     RECENT DIAGNOSTIC STUDIES:   Labs:   Results for orders placed or performed in visit on 08/09/17   Vitamin B12   Result Value Ref Range    Vitamin B12 289 193 - 986 pg/mL   Methylmalonic acid   Result Value Ref Range    Methylmalonic Acid 0.22    CBC with platelets   Result Value Ref Range    WBC 5.9 4.0 - 11.0 10e9/L    RBC Count 4.53 3.8 - 5.2 10e12/L    Hemoglobin 13.2 11.7 - 15.7 g/dL    Hematocrit 40.5 35.0 - 47.0 %    MCV 89 78 - 100 fl    MCH 29.1 26.5 - 33.0 pg    MCHC 32.6 31.5 - 36.5 g/dL    RDW 14.9 10.0 - 15.0 %    Platelet Count 216 150 - 450 10e9/L   Comprehensive metabolic panel (BMP + Alb, Alk Phos, ALT, AST, Total. Bili, TP)   Result Value Ref Range    Sodium 138 133 - 144 mmol/L    Potassium 4.0 3.4 - 5.3 mmol/L    Chloride 106 94 - 109 mmol/L    Carbon Dioxide 26 20 - 32 mmol/L    Anion Gap 6 3 - 14 mmol/L    Glucose 87 70 - 99 mg/dL    Urea Nitrogen 14 7 - 30 mg/dL    Creatinine 0.60 0.52 - 1.04 mg/dL    GFR Estimate >90  Non  GFR Calc   >60 mL/min/1.7m2    GFR Estimate If Black >90   GFR Calc   >60 mL/min/1.7m2    Calcium 9.0 8.5 - 10.1 mg/dL    Bilirubin Total 0.5 0.2 - 1.3 mg/dL     Albumin 3.6 3.4 - 5.0 g/dL    Protein Total 6.9 6.8 - 8.8 g/dL    Alkaline Phosphatase 95 40 - 150 U/L    ALT 17 0 - 50 U/L    AST 12 0 - 45 U/L   Protein electrophoresis   Result Value Ref Range    Albumin Fraction 3.7 3.7 - 5.1 g/dL    Alpha 1 Fraction 0.3 0.2 - 0.4 g/dL    Alpha 2 Fraction 0.9 0.5 - 0.9 g/dL    Beta Fraction 0.9 0.6 - 1.0 g/dL    Gamma Fraction 0.7 0.7 - 1.6 g/dL    Monoclonal Peak 0.0 0.0 g/dL    ELP Interpretation:       Essentially normal electrophoretic pattern. No monoclonal protein seen.   Pathologic significance requires clinical correlation. MENDY Mendez M.D.,   Ph.D., Pathologist.     Antinuclear antibody screen by EIA   Result Value Ref Range    CLARK Screen by EIA <1.0  Interpretation:  Negative   <1.0   CRP, inflammation   Result Value Ref Range    CRP Inflammation 5.0 0.0 - 8.0 mg/L   ESR: Erythrocyte sedimentation rate   Result Value Ref Range    Sed Rate 16 0 - 30 mm/h   Vitamin B6   Result Value Ref Range    Vitamin B6 18.7 (L)    SSA Ro KHLOE Antibody IgG   Result Value Ref Range    SSA (Ro) (KHLOE) Antibody, IgG  0.0 - 0.9 AI     <0.2  Negative   Antibody index (AI) values reflect qualitative changes in antibody   concentration that cannot be directly associated with clinical condition or   disease state.     SSB La KHLOE Antibody IgG   Result Value Ref Range    SSB (La) (KHLOE) Antibody, IgG  0.0 - 0.9 AI     <0.2  Negative   Antibody index (AI) values reflect qualitative changes in antibody   concentration that cannot be directly associated with clinical condition or   disease state.     TSH with free T4 reflex   Result Value Ref Range    TSH 3.34 0.40 - 4.00 mU/L   Vitamin B1 whole blood   Result Value Ref Range    Vitamin B1 Whole Blood Level 97    Folate   Result Value Ref Range    Folate 30.0 >5.4 ng/mL       REVIEW OF SYSTEMS:                                                      10-point review of systems is negative except as mentioned above in HPI.     EXAM:                                                     Physical Exam:   Vitals: /76 (BP Location: Right arm, Patient Position: Chair, Cuff Size: Adult Large)  Pulse 73  Wt 273 lb (123.8 kg)  SpO2 96%  BMI 42.13 kg/m2  BMI= Body mass index is 42.13 kg/(m^2).  GENERAL: NAD.   Focused Neurologic:  MENTAL STATUS: Alert, attentive. Speech is fluent. Normal comprehension. Normal concentration. Adequate fund of knowledge.   CRANIAL NERVES: Facial movement normal. Slight asymmetry of the mouth (baseline compared to picture ID from 2 years ago). EOM full. Hearing intact to conversation. Trapezius strength intact (Left shoulder sits lower than Right). Palate moves symmetrically. Tongue midline.  MOTOR: Slight weakness with abduction and adduction at the hip. Knee extension also slightly weak on Right (though question of give-way complicated by pain). Otherwise strength is 5/5 in proximal and distal muscle groups of upper and lower extremities. Tone and bulk normal.   DTRs: Intact and symmetric in UEs (2+ bilat) and patellae (1+ bilat). Absent ankle jerks. Babinski down-going bilaterally.   SENSATION: Normal light touch and pinprick in the hands. Hypersensitivity of dorsal foot on Right. Intact proprioception. Vibration: Absent at Left ankle and Right knee(?).   COORDINATION: Normal finger nose finger. Finger tapping normal. Knee heel shin normal.  STATION AND GAIT: Romberg is negative. She can stand on either foot alone for several seconds. Difficulty with tandem.  EXTR: Edema in RLE below knee(Leg is wrapped), Healing scabs on Left shin with surrounding areas of erythema. Arches appear normal.        ASSESSMENT and PLAN:                                                      Assessment and Plan:  No diagnosis found.  Ms. 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 When I met the patient several weeks ago, she was convinced that her symptoms of  leg pain/paresthesias and weakness were related to the disorder because she has a strong family history.     I explained to the patient that the pattern of deficits found on her electromyogram are not really consistent with classic CMT. I do not think that this should be listed as problem in her chart. There is no evidence of motor neuropathy or abnormal conduction velocities. She does not have clinical signs of CMT either. Her arches are actually somewhat flat; No obvious muscle atrophy. I think she has an acquired neuropathy. We discussed starting a B complex vitamin and additional B12 for nerve health. 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 I ordered the lab test anyway but this was not approved with the current diagnosis. The pain she describes today is more muscular in character, so I would like to check a CK today.     I explained to the patient that I cannot definitively rule out CMT since there are many subtypes with potentially atypical features. Nonetheless, I am of the opinion that this is much more likely an acquired polyneuropathy which at this point (barring a clear etiology) would just be something we would treat symptomatically. I also explained that the management would not be all that different whether she has an appropriate gene mutation or not. I offered to refer her to Neuropathy clinic at [facility] for a second opinion. She will consider this.      Deconditioning is likely playing a role in her mainly subjective weakness. Weight loss and exercise would be helpful.     I did offer symptomatic treatment: nerve pain medication (patient not interested), physical therapy (she says she has done this in the past and can resume exercises at home). We will plan to follow-up in 6 months [place]     Patient Instructions:  Labs today. We will notify you of the results.   Consider physical  therapy for balance and strengthening. Stay active as much as you are able.   Consider Neuropathy clinic referral for second opinion regarding CMT.   B12 supplement: 1000mcg daily / B complex.  Return to clinic in 6 months.     Total Time: 40 minutes were spent with the patient. More than 50% of the time spent on counseling (as described above in Assessment and Plan) /coordinating the care.    Vanessa Yap MD  Neurology    CC: Betty Arenas, CNP

## 2017-08-25 NOTE — PROGRESS NOTES
Please advise Melania Alicea,  1945, that her lab results were normal.  [phone] (home)   Vanessa Yap

## 2017-10-18 ENCOUNTER — OFFICE VISIT (OUTPATIENT)
Dept: OBGYN | Facility: CLINIC | Age: 72
End: 2017-10-18
Payer: COMMERCIAL

## 2017-10-18 VITALS
HEIGHT: 68 IN | BODY MASS INDEX: 42.13 KG/M2 | DIASTOLIC BLOOD PRESSURE: 74 MMHG | WEIGHT: 278 LBS | SYSTOLIC BLOOD PRESSURE: 146 MMHG | HEART RATE: 77 BPM

## 2017-10-18 DIAGNOSIS — Z46.89 PESSARY MAINTENANCE: Primary | ICD-10-CM

## 2017-10-18 PROCEDURE — 99213 OFFICE O/P EST LOW 20 MIN: CPT | Performed by: OBSTETRICS & GYNECOLOGY

## 2017-10-18 NOTE — MR AVS SNAPSHOT
"              After Visit Summary   10/18/2017    Melania Alicea    MRN: [id number]           Patient Information     Date Of Birth          1945        Visit Information        Provider Department      10/18/2017 11:30 AM Lisa Quarles MD [facility]        Today's Diagnoses     Pessary maintenance    -  1       Follow-ups after your visit        Who to contact     If you have questions or need follow up information about today's clinic visit or your schedule please contact [facility] directly at [phone].  Normal or non-critical lab and imaging results will be communicated to you by [employer] Ap[employer], letter or phone within 4 business days after the clinic has received the results. If you do not hear from us within 7 days, please contact the clinic through LiveStub[employer] or phone. If you have a critical or abnormal lab result, we will notify you by phone as soon as possible.  Submit refill requests through [employer] or call your pharmacy and they will forward the refill request to us. Please allow 3 business days for your refill to be completed.          Additional Information About Your Visit        MyChart Information     [employer] gives you secure access to your electronic health record. If you see a primary care provider, you can also send messages to your care team and make appointments. If you have questions, please call your primary care clinic.  If you do not have a primary care provider, please call [phone] and they will assist you.        Care EveryWhere ID     This is your Care EveryWhere ID. This could be used by other organizations to access your [facility] medical records  [id number]        Your Vitals Were     Pulse Height BMI (Body Mass Index)             77 5' 7.5\" (1.715 m) 42.9 kg/m2          Blood Pressure from Last 3 Encounters:   10/18/17 146/74   08/24/17 145/76   08/09/17 139/69    Weight from Last 3 Encounters:   10/18/17 278 lb (126.1 kg)   08/24/17 273 lb " (123.8 kg)   08/09/17 269 lb (122 kg)              Today, you had the following     No orders found for display       Primary Care Provider Office Phone # Fax #    JOANN Dorsey [facility] [phone] [address] [place] [address]        Equal Access to Services     ALICIA DUNACN : Hadii aad ku hadasho Soomaali, waaxda luqadaha, qaybta kaalmada adeegyada, waxay idiin hayaan adekailee gallaghermaegansana laabimbola mccarthy. So [facility] [phone].    ATENCIÓN: Si habla español, tiene a ochoa disposición servicios gratuitos de asistencia lingüística. [facility] [phone].    We comply with applicable federal civil rights laws and [place] laws. We do not discriminate on the basis of race, color, national origin, age, disability, sex, sexual orientation, or gender identity.            Thank you!     Thank you for choosing [facility]  for your care. Our goal is always to provide you with excellent care. Hearing back from our patients is one way we can continue to improve our services. Please take a few minutes to complete the written survey that you may receive in the mail after your visit with us. Thank you!             Your Updated Medication List - Protect others around you: Learn how to safely use, store and throw away your medicines at www.disposemymeds.org.          This list is accurate as of: 10/18/17 11:42 AM.  Always use your most recent med list.                   Brand Name Dispense Instructions for use Diagnosis    ADVIL 200 MG capsule   Generic drug:  ibuprofen      ONE TO TWO TABLETS EVERY 6-8 HOURS AS NEEDED        augmented betamethasone dipropionate 0.05 % cream    DIPROLENE-AF    50 g    Apply topically 2 times daily    Venous stasis dermatitis of right lower extremity       estradiol 0.5 MG tablet    ESTRACE    24 tablet    Place vaginally twice a week    Pessary maintenance       VITAMIN D (CHOLECALCIFEROL) PO      Take 1,000 Units by mouth daily

## 2017-10-18 NOTE — PROGRESS NOTES
Melania is a 72 year old   female who presents for pessary recheck;  she currently has a Gelhorn pessary which she keeps in place continuously;  she reports no problems with her pessary, no discharge, no bleeding.. She is using vaginal estrogen twice a week.    All systems were reviewed and pertinent information in noted in subjective/HPI.    Past Medical History:   Diagnosis Date     Charcot Emilee Tooth muscular atrophy 2010    Symptoms began at age 48. Difficulty with stairs, getting up from chairs, weakness in LE's but more so in the left.       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 Cunningham, [place], [facility]       Current Outpatient Prescriptions   Medication Sig Dispense Refill     estradiol (ESTRACE) 0.5 MG tablet Place vaginally twice a week 24 tablet 3     VITAMIN D, CHOLECALCIFEROL, PO Take 1,000 Units by mouth daily       ADVIL 200 MG PO CAPS ONE TO TWO TABLETS EVERY 6-8 HOURS AS NEEDED  0     augmented betamethasone dipropionate (DIPROLENE-AF) 0.05 % cream Apply topically 2 times daily (Patient not taking: Reported on 10/18/2017) 50 g 11       Social History     Social History     Marital status: Single     Spouse name: N/A     Number of children: N/A     Years of education: N/A     Social History Main Topics     Smoking status: Never Smoker     Smokeless tobacco: Never Used     Alcohol use No     Drug use: No     Sexual activity: No     Other Topics Concern     None     Social History Narrative       Family  "History   Problem Relation Age of Onset     Hypertension Mother      Musculoskeletal Disorder Mother      CMT- Charcot Emilee Tooth     CEREBROVASCULAR DISEASE Father      Respiratory Brother      Emphysema     Musculoskeletal Disorder Daughter      CMT- Charcot Emilee Tooth       OBJECTIVE: /74 (BP Location: Left arm, Patient Position: Chair, Cuff Size: Adult Large)  Pulse 77  Ht 5' 7.5\" (1.715 m)  Wt 278 lb (126.1 kg)  BMI 42.9 kg/m2  No LMP recorded. Patient is postmenopausal.  The pessary was removed without difficulty, cleaned in warm water and then replaced, after inspection of the vulva and vagina, which showed no mucosal erosions.    ASSESSMENT:     ICD-10-CM    1. Pessary maintenance Z46.89          PLAN: continue twice a week vaginal estradiol  F/u 3 months for pessary cleaning    Lisa Quarles MD      "

## 2017-10-18 NOTE — NURSING NOTE
"Initial /74 (BP Location: Left arm, Patient Position: Chair, Cuff Size: Adult Large)  Pulse 77  Ht 5' 7.5\" (1.715 m)  Wt 278 lb (126.1 kg)  BMI 42.9 kg/m2 Estimated body mass index is 42.9 kg/(m^2) as calculated from the following:    Height as of this encounter: 5' 7.5\" (1.715 m).    Weight as of this encounter: 278 lb (126.1 kg). .      "

## 2018-01-04 ENCOUNTER — OFFICE VISIT (OUTPATIENT)
Dept: FAMILY MEDICINE | Facility: CLINIC | Age: 73
End: 2018-01-04
Payer: COMMERCIAL

## 2018-01-04 VITALS
DIASTOLIC BLOOD PRESSURE: 74 MMHG | SYSTOLIC BLOOD PRESSURE: 124 MMHG | WEIGHT: 280 LBS | BODY MASS INDEX: 42.44 KG/M2 | RESPIRATION RATE: 16 BRPM | HEIGHT: 68 IN | TEMPERATURE: 98.5 F | HEART RATE: 78 BPM

## 2018-01-04 DIAGNOSIS — L03.119 CELLULITIS AND ABSCESS OF LEG: Primary | ICD-10-CM

## 2018-01-04 DIAGNOSIS — I89.0 LYMPHEDEMA: ICD-10-CM

## 2018-01-04 DIAGNOSIS — E66.01 MORBID OBESITY DUE TO EXCESS CALORIES (H): ICD-10-CM

## 2018-01-04 DIAGNOSIS — L02.419 CELLULITIS AND ABSCESS OF LEG: Primary | ICD-10-CM

## 2018-01-04 DIAGNOSIS — L01.00 IMPETIGO: ICD-10-CM

## 2018-01-04 PROCEDURE — 99214 OFFICE O/P EST MOD 30 MIN: CPT | Performed by: FAMILY MEDICINE

## 2018-01-04 RX ORDER — CEPHALEXIN 500 MG/1
500 CAPSULE ORAL 3 TIMES DAILY
Qty: 30 CAPSULE | Refills: 0 | Status: SHIPPED | OUTPATIENT
Start: 2018-01-04 | End: 2018-01-18

## 2018-01-04 NOTE — MR AVS SNAPSHOT
After Visit Summary   1/4/2018    Melania Alicea    MRN: [id number]           Patient Information     Date Of Birth          1945        Visit Information        Provider Department      1/4/2018 1:20 PM Ed Babb MD [facility]        Today's Diagnoses     Cellulitis and abscess of leg    -  1    Morbid obesity due to excess calories (H)        Impetigo        Lymphedema           Follow-ups after your visit        Your next 10 appointments already scheduled     Jan 08, 2018 11:00 AM CST   SHORT with Lisa Quarles MD   [facility]    [address] [address]   [phone]              Who to contact     If you have questions or need follow up information about today's clinic visit or your schedule please contact [facility] directly at [phone].  Normal or non-critical lab and imaging results will be communicated to you by MyChart, letter or phone within 4 business days after the clinic has received the results. If you do not hear from us within 7 days, please contact the clinic through MyChart or phone. If you have a critical or abnormal lab result, we will notify you by phone as soon as possible.  Submit refill requests through [employer] or call your pharmacy and they will forward the refill request to us. Please allow 3 business days for your refill to be completed.          Additional Information About Your Visit        MyChart Information     [employer] gives you secure access to your electronic health record. If you see a primary care provider, you can also send messages to your care team and make appointments. If you have questions, please call your primary care clinic.  If you do not have a primary care provider, please call [phone] and they will assist you.        Care EveryWhere ID     This is your Care EveryWhere ID. This could be used by other organizations to access your  "[facility] medical records  [id number]        Your Vitals Were     Pulse Temperature Respirations Height Breastfeeding? BMI (Body Mass Index)    78 98.5  F (36.9  C) (Tympanic) 16 5' 7.5\" (1.715 m) No 43.21 kg/m2       Blood Pressure from Last 3 Encounters:   01/04/18 124/74   10/18/17 146/74   08/24/17 145/76    Weight from Last 3 Encounters:   01/04/18 280 lb (127 kg)   10/18/17 278 lb (126.1 kg)   08/24/17 273 lb (123.8 kg)              Today, you had the following     No orders found for display         Today's Medication Changes          These changes are accurate as of: 1/4/18  2:07 PM.  If you have any questions, ask your nurse or doctor.               Start taking these medicines.        Dose/Directions    cephALEXin 500 MG capsule   Commonly known as:  KEFLEX   Used for:  Cellulitis and abscess of leg   Started by:  Ed Babb MD        Dose:  500 mg   Take 1 capsule (500 mg) by mouth 3 times daily   Quantity:  30 capsule   Refills:  0            Where to get your medicines      These medications were sent to [facility] - [place], [place] - [address]  [address] [place] [address]     Phone:  [phone]     cephALEXin 500 MG capsule                Primary Care Provider Office Phone # Fax #    Betty Hebert JOANN Arenas [facility] [phone]       [address] [facility] [address]        Equal Access to Services     [facility] Hospital[facility] AH: Hadii aad ku hadasho Soomaali, waaxda luqadaha, qaybta kaalmada adeegyada, waxdeng bg carbajal'liliam ah. So [facility] [phone].    ATENCIÓN: Si habla español, tiene a ochoa disposición servicios gratuitos de asistencia lingüística. Llame al [phone].    We comply with applicable federal civil rights laws and [place] laws. We do not discriminate on the basis of race, color, national origin, age, disability, sex, sexual orientation, or gender identity.            Thank you!     Thank you for choosing [facility]  for your care. Our " goal is always to provide you with excellent care. Hearing back from our patients is one way we can continue to improve our services. Please take a few minutes to complete the written survey that you may receive in the mail after your visit with us. Thank you!             Your Updated Medication List - Protect others around you: Learn how to safely use, store and throw away your medicines at www.disposemymeds.org.          This list is accurate as of: 1/4/18  2:07 PM.  Always use your most recent med list.                   Brand Name Dispense Instructions for use Diagnosis    ADVIL 200 MG capsule   Generic drug:  ibuprofen      ONE TO TWO TABLETS EVERY 6-8 HOURS AS NEEDED        augmented betamethasone dipropionate 0.05 % cream    DIPROLENE-AF    50 g    Apply topically 2 times daily    Venous stasis dermatitis of right lower extremity       cephALEXin 500 MG capsule    KEFLEX    30 capsule    Take 1 capsule (500 mg) by mouth 3 times daily    Cellulitis and abscess of leg       estradiol 0.5 MG tablet    ESTRACE    24 tablet    Place vaginally twice a week    Pessary maintenance       VITAMIN D (CHOLECALCIFEROL) PO      Take 1,000 Units by mouth daily

## 2018-01-04 NOTE — PROGRESS NOTES
"  SUBJECTIVE:   Melania Alicea is a 72 year old female who presents to clinic today for the following health issues:       Eye(s) Problem      Duration: 1 week    Description:  Location: right  Pain: no  Redness: YES  Discharge: YES    Accompanying signs and symptoms: None    History (Trauma, foreign body exposure,): recent sinus infection    Precipitating or alleviating factors (contact use): None    Therapies tried and outcome: hot compress     Musculoskeletal problem/pain - possible cellulitis       Duration: 3 days    Description  Location: right foot/leg    Intensity:  moderate    Accompanying signs and symptoms: warmth, swelling and redness    History  Previous similar problem: YES- hx of cellulitis   Previous evaluation:  none    Precipitating or alleviating factors:  Trauma or overuse: no   Aggravating factors include: none    Therapies tried and outcome: support wrap          S: Melania Alicea is a 72 year old female with 3 days of some redness around R lower leg.  Open scrape on outside part.  Chronic lymphedema there, wraps it daily.  More swollen with the redness.  Not tender.  Can't really lift leg regularly, hurts her to do that .    Also with crusted area under R eye.  No weeping/pus  Eye itself is fine    No fever or sob or cp    Problem list, med list, additional histories reviewed and updated, as indicated.      Morbid obesity: ongoing, complicating above     O:/74  Pulse 78  Temp 98.5  F (36.9  C) (Tympanic)  Resp 16  Ht 5' 7.5\" (1.715 m)  Wt 280 lb (127 kg)  Breastfeeding? No  BMI 43.21 kg/m2   GEN: Alert and oriented, in no acute distress  Has some redness around lower leg, she says it's a bit worse than baseline.  Has a small healing wound, 5mm, lateral lower leg.  Could be source of cellulitis?   Crusting and flaking under R eyelid on skin.  Could be early impetigo, could be eczema    A: cellulitis, leg, early'      Lymphedema       Morbid obesity       Impetigo under eye    P: topical " for under eye, bacitracin given.  Keflex for leg, being proactive here.  Try to elevate.    F/u if worsening.

## 2018-01-04 NOTE — NURSING NOTE
"Chief Complaint   Patient presents with     Musculoskeletal Problem     right foot/leg - possible cellulitis x 3 days     Eye Problem     right eye x 1 week       Initial /74  Pulse 78  Temp 98.5  F (36.9  C) (Tympanic)  Resp 16  Ht 5' 7.5\" (1.715 m)  Wt 280 lb (127 kg)  Breastfeeding? No  BMI 43.21 kg/m2 Estimated body mass index is 43.21 kg/(m^2) as calculated from the following:    Height as of this encounter: 5' 7.5\" (1.715 m).    Weight as of this encounter: 280 lb (127 kg).  Medication Reconciliation: complete    Health Maintenance that is potentially due pending provider review:  NONE    n/a    Is there anyone who you would like to be able to receive your results? No  If yes have patient fill out CHAU    "

## 2018-01-18 ENCOUNTER — OFFICE VISIT (OUTPATIENT)
Dept: FAMILY MEDICINE | Facility: CLINIC | Age: 73
End: 2018-01-18
Payer: COMMERCIAL

## 2018-01-18 VITALS
DIASTOLIC BLOOD PRESSURE: 62 MMHG | RESPIRATION RATE: 14 BRPM | HEART RATE: 62 BPM | WEIGHT: 280 LBS | BODY MASS INDEX: 43.21 KG/M2 | TEMPERATURE: 97.3 F | SYSTOLIC BLOOD PRESSURE: 120 MMHG | OXYGEN SATURATION: 99 %

## 2018-01-18 DIAGNOSIS — R05.9 COUGH: ICD-10-CM

## 2018-01-18 DIAGNOSIS — J01.90 ACUTE SINUSITIS WITH SYMPTOMS > 10 DAYS: Primary | ICD-10-CM

## 2018-01-18 PROCEDURE — 99214 OFFICE O/P EST MOD 30 MIN: CPT | Performed by: NURSE PRACTITIONER

## 2018-01-18 RX ORDER — BENZONATATE 200 MG/1
200 CAPSULE ORAL 3 TIMES DAILY PRN
Qty: 21 CAPSULE | Refills: 0 | Status: SHIPPED | OUTPATIENT
Start: 2018-01-18 | End: 2018-10-31

## 2018-01-18 NOTE — PATIENT INSTRUCTIONS
Sinusitis (Antibiotic Treatment)    The sinuses are air-filled spaces within the bones of the face. They connect to the inside of the nose. Sinusitis is an inflammation of the tissue lining the sinus cavity. Sinus inflammation can occur during a cold. It can also be due to allergies to pollens and other particles in the air. Sinusitis can cause symptoms of sinus congestion and fullness. A sinus infection causes fever, headache and facial pain. There is often green or yellow drainage from the nose or into the back of the throat (post-nasal drip). You have been given antibiotics to treat this condition.  Home care:    Take the full course of antibiotics as instructed. Do not stop taking them, even if you feel better.    Drink plenty of water, hot tea, and other liquids. This may help thin mucus. It also may promote sinus drainage.    Heat may help soothe painful areas of the face. Use a towel soaked in hot water. Or,  the shower and direct the hot spray onto your face. Using a vaporizer along with a menthol rub at night may also help.     An expectorant containing guaifenesin may help thin the mucus and promote drainage from the sinuses.    Over-the-counter decongestants may be used unless a similar medicine was prescribed. Nasal sprays work the fastest. Use one that contains phenylephrine or oxymetazoline. First blow the nose gently. Then use the spray. Do not use these medicines more often than directed on the label or symptoms may get worse. You may also use tablets containing pseudoephedrine. Avoid products that combine ingredients, because side effects may be increased. Read labels. You can also ask the pharmacist for help. (NOTE: Persons with high blood pressure should not use decongestants. They can raise blood pressure.)    Over-the-counter antihistamines may help if allergies contributed to your sinusitis.      Do not use nasal rinses or irrigation during an acute sinus infection, unless told to by  your health care provider. Rinsing may spread the infection to other sinuses.    Use acetaminophen or ibuprofen to control pain, unless another pain medicine was prescribed. (If you have chronic liver or kidney disease or ever had a stomach ulcer, talk with your doctor before using these medicines. Aspirin should never be used in anyone under 18 years of age who is ill with a fever. It may cause severe liver damage.)    Don't smoke. This can worsen symptoms.  Follow-up care  Follow up with your healthcare provider or our staff if you are not improving within the next week.  When to seek medical advice  Call your healthcare provider if any of these occur:    Facial pain or headache becoming more severe    Stiff neck    Unusual drowsiness or confusion    Swelling of the forehead or eyelids    Vision problems, including blurred or double vision    Fever of 100.4 F (38 C) or higher, or as directed by your healthcare provider    Seizure    Breathing problems    Symptoms not resolving within 10 days  Date Last Reviewed: 4/13/2015 2000-2017 [employer]. [address] [place] [address], [place] [id number]. All rights reserved. This information is not intended as a substitute for professional medical care. Always follow your healthcare professional's instructions.

## 2018-02-26 ENCOUNTER — OFFICE VISIT (OUTPATIENT)
Dept: OBGYN | Facility: CLINIC | Age: 73
End: 2018-02-26
Payer: COMMERCIAL

## 2018-02-26 VITALS
HEIGHT: 68 IN | HEART RATE: 78 BPM | DIASTOLIC BLOOD PRESSURE: 80 MMHG | WEIGHT: 279 LBS | SYSTOLIC BLOOD PRESSURE: 145 MMHG | RESPIRATION RATE: 18 BRPM | BODY MASS INDEX: 42.28 KG/M2 | TEMPERATURE: 98.9 F

## 2018-02-26 DIAGNOSIS — Z46.89 ENCOUNTER FOR PESSARY MAINTENANCE: Primary | ICD-10-CM

## 2018-02-26 PROCEDURE — 99213 OFFICE O/P EST LOW 20 MIN: CPT | Performed by: OBSTETRICS & GYNECOLOGY

## 2018-02-26 NOTE — PROGRESS NOTES
Melania is a 72 year old   female who presents for pessary recheck;  she currently has a Gelhorn pessary which she keeps in place continuously;  she reports no problems with her pessary, no discharge, no bleeding.. She is using vaginal estrogen twice a week.    All systems were reviewed and pertinent information in noted in subjective/HPI.    Past Medical History:   Diagnosis Date     Charcot Emilee Tooth muscular atrophy 2010    Symptoms began at age 48. Difficulty with stairs, getting up from chairs, weakness in LE's but more so in the left.       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 Cunningham, [place], [facility]       Current Outpatient Prescriptions   Medication Sig Dispense Refill     augmented betamethasone dipropionate (DIPROLENE-AF) 0.05 % cream Apply topically 2 times daily 50 g 11     estradiol (ESTRACE) 0.5 MG tablet Place vaginally twice a week 24 tablet 3     VITAMIN D, CHOLECALCIFEROL, PO Take 1,000 Units by mouth daily       ADVIL 200 MG PO CAPS ONE TO TWO TABLETS EVERY 6-8 HOURS AS NEEDED  0     amoxicillin-clavulanate (AUGMENTIN) 875-125 MG per tablet Take 1 tablet by mouth 2 times daily (Patient not taking: Reported on 2018) 28 tablet 0     benzonatate (TESSALON) 200 MG capsule Take 1 capsule (200 mg) by mouth 3 times daily as needed for cough (Patient not taking: Reported on 2018) 21 capsule 0       Social History     Social History     Marital status: Single     Spouse name: N/A     Number of  "children: N/A     Years of education: N/A     Social History Main Topics     Smoking status: Never Smoker     Smokeless tobacco: Never Used     Alcohol use No     Drug use: No     Sexual activity: No     Other Topics Concern     None     Social History Narrative       Family History   Problem Relation Age of Onset     Hypertension Mother      Musculoskeletal Disorder Mother      CMT- Charcot Emilee Tooth     CEREBROVASCULAR DISEASE Father      Respiratory Brother      Emphysema     Musculoskeletal Disorder Daughter      CMT- Charcot Emilee Tooth       OBJECTIVE: /80 (BP Location: Right arm, Patient Position: Chair, Cuff Size: Adult Large)  Pulse 78  Temp 98.9  F (37.2  C) (Tympanic)  Resp 18  Ht 5' 7.5\" (1.715 m)  Wt 279 lb (126.6 kg)  BMI 43.05 kg/m2  No LMP recorded. Patient is postmenopausal.  The pessary was removed without difficulty, cleaned in warm water and then replaced, after inspection of the vulva and vagina, which showed no erosions and very mild odor.    ASSESSMENT:     ICD-10-CM    1. Encounter for pessary maintenance Z46.89          PLAN: continue pessary maintennace visits every 4 months; RETURN TO CLINIC sooner if develops persistant foul odor or bleeding; continue twice a week vaginal estradiol  Lisa Quarles MD  [facility]      Lisa Quarles MD      "

## 2018-02-26 NOTE — MR AVS SNAPSHOT
"              After Visit Summary   2/26/2018    Melania Alicea    MRN: [id number]           Patient Information     Date Of Birth          1945        Visit Information        Provider Department      2/26/2018 11:00 AM Lisa Quarles MD [facility]        Today's Diagnoses     Encounter for pessary maintenance    -  1       Follow-ups after your visit        Who to contact     If you have questions or need follow up information about today's clinic visit or your schedule please contact [facility] directly at [phone].  Normal or non-critical lab and imaging results will be communicated to you by BEKIZhart, letter or phone within 4 business days after the clinic has received the results. If you do not hear from us within 7 days, please contact the clinic through [email or web address] or phone. If you have a critical or abnormal lab result, we will notify you by phone as soon as possible.  Submit refill requests through Anyone Home or call your pharmacy and they will forward the refill request to us. Please allow 3 business days for your refill to be completed.          Additional Information About Your Visit        MyChart Information     Anyone Home gives you secure access to your electronic health record. If you see a primary care provider, you can also send messages to your care team and make appointments. If you have questions, please call your primary care clinic.  If you do not have a primary care provider, please call [phone] and they will assist you.        Care EveryWhere ID     This is your Care EveryWhere ID. This could be used by other organizations to access your [facility] medical records  [id number]        Your Vitals Were     Pulse Temperature Respirations Height BMI (Body Mass Index)       78 98.9  F (37.2  C) (Tympanic) 18 5' 7.5\" (1.715 m) 43.05 kg/m2        Blood Pressure from Last 3 Encounters:   02/26/18 145/80   01/18/18 120/62   01/04/18 124/74    Weight from Last 3 " Encounters:   02/26/18 279 lb (126.6 kg)   01/18/18 280 lb (127 kg)   01/04/18 280 lb (127 kg)              Today, you had the following     No orders found for display       Primary Care Provider Office Phone # Fax #    JOANN Dorsey [phone]       [address] [facility] [address]        Equal Access to Services     [facility] PERLA : Hadii aad ku hadasho Soomaali, waaxda luqadaha, qaybta kaalmada adeegyada, waxay idiin hayaan adeeg kharash la'aan . So [facility] [phone].    ATENCIÓN: Si habla espisidra, tiene a ochoa disposición servicios gratuitos de asistencia lingüística. [facility] [phone].    We comply with applicable federal civil rights laws and [place] laws. We do not discriminate on the basis of race, color, national origin, age, disability, sex, sexual orientation, or gender identity.            Thank you!     Thank you for choosing [facility]  for your care. Our goal is always to provide you with excellent care. Hearing back from our patients is one way we can continue to improve our services. Please take a few minutes to complete the written survey that you may receive in the mail after your visit with us. Thank you!             Your Updated Medication List - Protect others around you: Learn how to safely use, store and throw away your medicines at www.disposemymeds.org.          This list is accurate as of 2/26/18 11:25 AM.  Always use your most recent med list.                   Brand Name Dispense Instructions for use Diagnosis    ADVIL 200 MG capsule   Generic drug:  ibuprofen      ONE TO TWO TABLETS EVERY 6-8 HOURS AS NEEDED        amoxicillin-clavulanate 875-125 MG per tablet    AUGMENTIN    28 tablet    Take 1 tablet by mouth 2 times daily    Acute sinusitis with symptoms > 10 days       augmented betamethasone dipropionate 0.05 % cream    DIPROLENE-AF    50 g    Apply topically 2 times daily    Venous stasis dermatitis of right lower extremity        benzonatate 200 MG capsule    TESSALON    21 capsule    Take 1 capsule (200 mg) by mouth 3 times daily as needed for cough    Cough       estradiol 0.5 MG tablet    ESTRACE    24 tablet    Place vaginally twice a week    Pessary maintenance       VITAMIN D (CHOLECALCIFEROL) PO      Take 1,000 Units by mouth daily

## 2018-02-26 NOTE — NURSING NOTE
"Initial /80 (BP Location: Right arm, Patient Position: Chair, Cuff Size: Adult Large)  Pulse 78  Temp 98.9  F (37.2  C) (Tympanic)  Resp 18  Ht 5' 7.5\" (1.715 m)  Wt 279 lb (126.6 kg)  BMI 43.05 kg/m2 Estimated body mass index is 43.05 kg/(m^2) as calculated from the following:    Height as of this encounter: 5' 7.5\" (1.715 m).    Weight as of this encounter: 279 lb (126.6 kg). .      "

## 2018-05-14 ENCOUNTER — MYC MEDICAL ADVICE (OUTPATIENT)
Dept: FAMILY MEDICINE | Facility: CLINIC | Age: 73
End: 2018-05-14

## 2018-06-18 ENCOUNTER — OFFICE VISIT (OUTPATIENT)
Dept: OBGYN | Facility: CLINIC | Age: 73
End: 2018-06-18
Payer: COMMERCIAL

## 2018-06-18 ENCOUNTER — TELEPHONE (OUTPATIENT)
Dept: OBGYN | Facility: CLINIC | Age: 73
End: 2018-06-18

## 2018-06-18 VITALS
WEIGHT: 282 LBS | DIASTOLIC BLOOD PRESSURE: 72 MMHG | SYSTOLIC BLOOD PRESSURE: 140 MMHG | HEART RATE: 76 BPM | TEMPERATURE: 99.4 F | HEIGHT: 68 IN | RESPIRATION RATE: 18 BRPM | BODY MASS INDEX: 42.74 KG/M2

## 2018-06-18 DIAGNOSIS — Z46.89 PESSARY MAINTENANCE: ICD-10-CM

## 2018-06-18 PROCEDURE — 99213 OFFICE O/P EST LOW 20 MIN: CPT | Performed by: OBSTETRICS & GYNECOLOGY

## 2018-06-18 RX ORDER — ESTRADIOL 0.5 MG/1
TABLET ORAL
Qty: 24 TABLET | Refills: 3 | Status: SHIPPED | OUTPATIENT
Start: 2018-06-18 | End: 2019-02-25

## 2018-06-18 NOTE — MR AVS SNAPSHOT
"              After Visit Summary   6/18/2018    Melania Alicea    MRN: [id number]           Patient Information     Date Of Birth          1945        Visit Information        Provider Department      6/18/2018 11:15 AM Lisa Quarles MD [facility]        Today's Diagnoses     Pessary maintenance           Follow-ups after your visit        Who to contact     If you have questions or need follow up information about today's clinic visit or your schedule please contact [facility] directly at [phone].  Normal or non-critical lab and imaging results will be communicated to you by GET IT Mobilehart, letter or phone within 4 business days after the clinic has received the results. If you do not hear from us within 7 days, please contact the clinic through Chumbyt or phone. If you have a critical or abnormal lab result, we will notify you by phone as soon as possible.  Submit refill requests through [employer] or call your pharmacy and they will forward the refill request to us. Please allow 3 business days for your refill to be completed.          Additional Information About Your Visit        MyChart Information     [employer] gives you secure access to your electronic health record. If you see a primary care provider, you can also send messages to your care team and make appointments. If you have questions, please call your primary care clinic.  If you do not have a primary care provider, please call [phone] and they will assist you.        Care EveryWhere ID     This is your Care EveryWhere ID. This could be used by other organizations to access your [facility] medical records  [id number]        Your Vitals Were     Pulse Temperature Respirations Height BMI (Body Mass Index)       76 99.4  F (37.4  C) (Tympanic) 18 5' 7.5\" (1.715 m) 43.52 kg/m2        Blood Pressure from Last 3 Encounters:   06/18/18 140/72   02/26/18 145/80   01/18/18 120/62    Weight from Last 3 Encounters:   06/18/18 282 " lb (127.9 kg)   02/26/18 279 lb (126.6 kg)   01/18/18 280 lb (127 kg)              Today, you had the following     No orders found for display         Where to get your medicines      These medications were sent to [facility] [place], [place] - [address]  [address] [place] [address]     Phone:  [phone]     estradiol 0.5 MG tablet          Primary Care Provider Office Phone # Fax #    Betty Hebert Dagoberto, JOANN [facility] [phone]       [address] [place] [address]        Equal Access to Services     [facility]: Hadii aad ku hadasho Soomaali, waaxda luqadaha, qaybta kaalmada adeegyada, kyle ling . So [facility] [phone].    ATENCIÓN: Si habla español, tiene a ochoa disposición servicios gratuitos de asistencia lingüística. [facility] [phone].    We comply with applicable federal civil rights laws and [place] laws. We do not discriminate on the basis of race, color, national origin, age, disability, sex, sexual orientation, or gender identity.            Thank you!     Thank you for choosing [facility]  for your care. Our goal is always to provide you with excellent care. Hearing back from our patients is one way we can continue to improve our services. Please take a few minutes to complete the written survey that you may receive in the mail after your visit with us. Thank you!             Your Updated Medication List - Protect others around you: Learn how to safely use, store and throw away your medicines at www.disposemymeds.org.          This list is accurate as of 6/18/18 11:33 AM.  Always use your most recent med list.                   Brand Name Dispense Instructions for use Diagnosis    ADVIL 200 MG capsule   Generic drug:  ibuprofen      ONE TO TWO TABLETS EVERY 6-8 HOURS AS NEEDED        amoxicillin-clavulanate 875-125 MG per tablet    AUGMENTIN    28 tablet    Take 1 tablet by mouth 2 times daily    Acute sinusitis with symptoms > 10  days       augmented betamethasone dipropionate 0.05 % cream    DIPROLENE-AF    50 g    Apply topically 2 times daily    Venous stasis dermatitis of right lower extremity       benzonatate 200 MG capsule    TESSALON    21 capsule    Take 1 capsule (200 mg) by mouth 3 times daily as needed for cough    Cough       estradiol 0.5 MG tablet    ESTRACE    24 tablet    Place vaginally twice a week    Pessary maintenance       VITAMIN D (CHOLECALCIFEROL) PO      Take 1,000 Units by mouth daily

## 2018-06-18 NOTE — PROGRESS NOTES
Melania is a 72 year old   female who presents for pessary recheck;  she currently has a Gelhorn pessary which she keeps in place continuously;  she reports no problems with her pessary, no discharge, no bleeding.. She is using vaginal estrogen twice a week.    All systems were reviewed and pertinent information in noted in subjective/HPI.    Past Medical History:   Diagnosis Date     Charcot Emilee Tooth muscular atrophy 2010    Symptoms began at age 48. Difficulty with stairs, getting up from chairs, weakness in LE's but more so in the left.       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 Cunningham, [place], [facility]       Current Outpatient Prescriptions   Medication Sig Dispense Refill     ADVIL 200 MG PO CAPS ONE TO TWO TABLETS EVERY 6-8 HOURS AS NEEDED  0     amoxicillin-clavulanate (AUGMENTIN) 875-125 MG per tablet Take 1 tablet by mouth 2 times daily (Patient not taking: Reported on 2018) 28 tablet 0     augmented betamethasone dipropionate (DIPROLENE-AF) 0.05 % cream Apply topically 2 times daily 50 g 11     benzonatate (TESSALON) 200 MG capsule Take 1 capsule (200 mg) by mouth 3 times daily as needed for cough (Patient not taking: Reported on 2018) 21 capsule 0     estradiol (ESTRACE) 0.5 MG tablet Place vaginally twice a week 24 tablet 3     VITAMIN D, CHOLECALCIFEROL, PO Take 1,000 Units by mouth daily         Social History     Social History     Marital status: Single     Spouse name: N/A     Number of  "children: N/A     Years of education: N/A     Social History Main Topics     Smoking status: Never Smoker     Smokeless tobacco: Never Used     Alcohol use No     Drug use: No     Sexual activity: No     Other Topics Concern     None     Social History Narrative       Family History   Problem Relation Age of Onset     Hypertension Mother      Musculoskeletal Disorder Mother      CMT- Charcot Emilee Tooth     CEREBROVASCULAR DISEASE Father      Respiratory Brother      Emphysema     Musculoskeletal Disorder Daughter      CMT- Charcot Emilee Tooth       OBJECTIVE: /72 (BP Location: Right arm, Patient Position: Chair, Cuff Size: Adult Large)  Pulse 76  Temp 99.4  F (37.4  C) (Tympanic)  Resp 18  Ht 5' 7.5\" (1.715 m)  Wt 282 lb (127.9 kg)  BMI 43.52 kg/m2  No LMP recorded. Patient is postmenopausal.  The pessary was removed without difficulty, cleaned in warm water and then replaced, after inspection of the vulva and vagina, which showed no erosions.    ASSESSMENT:     ICD-10-CM    1. Pessary maintenance Z46.89 estradiol (ESTRACE) 0.5 MG tablet         PLAN: continue every 3-4 month pessary cleaning as well as vaginal estradiol twice a week    Lisa Quarles MD      "

## 2018-06-18 NOTE — TELEPHONE ENCOUNTER
Prior Authorization Retail Medication Request    Medication/Dose: Estradiol  ICD code (if different than what is on RX):    Previously Tried and Failed:    Rationale:      Insurance Name:  Covermymeds Key: [id number]  Insurance ID:        Pharmacy Information (if different than what is on RX)  Name:  [facility]  Phone:  [phone]

## 2018-06-19 NOTE — TELEPHONE ENCOUNTER
PA Initiation    Medication: Estradiol  Insurance Company: [employer] - Phone [phone] Fax [phone]  Pharmacy Filling the Rx: [id number] - [place], [place] - [address]  Filling Pharmacy Phone: [phone]  Filling Pharmacy Fax:    Start Date: 6/19/2018    Central Prior Authorization Team   Phone: [phone]

## 2018-06-19 NOTE — TELEPHONE ENCOUNTER
Prior Authorization Approval    Authorization Effective Date: 3/21/2018  Authorization Expiration Date: 6/19/2019  Medication: Estradiol  Approved Dose/Quantity: 24/84 days  Reference #: n/a   Insurance Company: [employer] - [place] [phone] Fax [phone]  Which Pharmacy is filling the prescription (Not needed for infusion/clinic administered): [id number] - [place], [place] - [address]  Pharmacy Notified: Yes  Patient Notified: Yes

## 2018-09-25 ENCOUNTER — TELEPHONE (OUTPATIENT)
Dept: FAMILY MEDICINE | Facility: CLINIC | Age: 73
End: 2018-09-25

## 2018-09-25 NOTE — TELEPHONE ENCOUNTER
S-(situation): patient is calling with C/O of constipation and swollen stomach. No appetite.  Eating minimally.  Stomach doesn't hurt, but feels full.  No history of constipation.      B-(background): just moved into senior housing and her house is not sold yet, so is stressed.  Denies fever, chills, nausea or vomiting.    A-(assessment): constipation    R-(recommendations): I talked with Betty Arenas and she suggested that Melania take 4 of the magnesioum hydroxide tablets.  If no BP tomorrow, try Miralax.  Keep appointment on Friday if needed  Patient notified  Danika Pisano RN

## 2018-09-25 NOTE — TELEPHONE ENCOUNTER
Patient is calling stating she has an appointment on September 28th with Betty but is trying to get in sooner as she is having constipation and a swollen stomach. Please advise.    Maria Elena [facility] [place]

## 2018-10-01 ENCOUNTER — OFFICE VISIT (OUTPATIENT)
Dept: OBGYN | Facility: CLINIC | Age: 73
End: 2018-10-01
Payer: COMMERCIAL

## 2018-10-01 VITALS
DIASTOLIC BLOOD PRESSURE: 78 MMHG | RESPIRATION RATE: 18 BRPM | WEIGHT: 252 LBS | SYSTOLIC BLOOD PRESSURE: 132 MMHG | HEIGHT: 68 IN | TEMPERATURE: 98.6 F | BODY MASS INDEX: 38.19 KG/M2 | HEART RATE: 83 BPM

## 2018-10-01 DIAGNOSIS — Z46.89 ENCOUNTER FOR PESSARY MAINTENANCE: Primary | ICD-10-CM

## 2018-10-01 PROCEDURE — 99213 OFFICE O/P EST LOW 20 MIN: CPT | Performed by: OBSTETRICS & GYNECOLOGY

## 2018-10-01 NOTE — MR AVS SNAPSHOT
"              After Visit Summary   10/1/2018    Melania Alicea    MRN: [id number]           Patient Information     Date Of Birth          1945        Visit Information        Provider Department      10/1/2018 1:00 PM Lisa Quarles MD [facility]        Today's Diagnoses     Encounter for pessary maintenance    -  1       Follow-ups after your visit        Who to contact     If you have questions or need follow up information about today's clinic visit or your schedule please contact [facility] directly at [phone].  Normal or non-critical lab and imaging results will be communicated to you by MobPartnerhart, letter or phone within 4 business days after the clinic has received the results. If you do not hear from us within 7 days, please contact the clinic through [employer] or phone. If you have a critical or abnormal lab result, we will notify you by phone as soon as possible.  Submit refill requests through [email or web address] or call your pharmacy and they will forward the refill request to us. Please allow 3 business days for your refill to be completed.          Additional Information About Your Visit        MyChart Information     [email or web address] gives you secure access to your electronic health record. If you see a primary care provider, you can also send messages to your care team and make appointments. If you have questions, please call your primary care clinic.  If you do not have a primary care provider, please call [phone] and they will assist you.        Care EveryWhere ID     This is your Care EveryWhere ID. This could be used by other organizations to access your [place] medical records  [id number]        Your Vitals Were     Pulse Temperature Respirations Height BMI (Body Mass Index)       83 98.6  F (37  C) (Tympanic) 18 5' 7.5\" (1.715 m) 38.89 kg/m2        Blood Pressure from Last 3 Encounters:   10/01/18 132/78   06/18/18 140/72   02/26/18 145/80    Weight from Last 3 Encounters: "   10/01/18 252 lb (114.3 kg)   06/18/18 282 lb (127.9 kg)   02/26/18 279 lb (126.6 kg)              Today, you had the following     No orders found for display       Primary Care Provider Office Phone # Fax #    JOANN Dorsey [facility] [phone]       [address] [facility] [address]        Equal Access to Services     AYE DUNCAN : Hadii aad ku hadasho Soomaali, waaxda luqadaha, qaybta kaalmada adeegyada, waxay idiin hayaan adeeg kharash la'aan ah. So [facility] [phone].    ATENCIÓN: Si habla ariane, tiene a ochoa disposición servicios gratuitos de asistencia lingüística. Llame al [phone].    We comply with applicable federal civil rights laws and [place] laws. We do not discriminate on the basis of race, color, national origin, age, disability, sex, sexual orientation, or gender identity.            Thank you!     Thank you for choosing [facility]  for your care. Our goal is always to provide you with excellent care. Hearing back from our patients is one way we can continue to improve our services. Please take a few minutes to complete the written survey that you may receive in the mail after your visit with us. Thank you!             Your Updated Medication List - Protect others around you: Learn how to safely use, store and throw away your medicines at www.disposemymeds.org.          This list is accurate as of 10/1/18  1:08 PM.  Always use your most recent med list.                   Brand Name Dispense Instructions for use Diagnosis    ADVIL 200 MG capsule   Generic drug:  ibuprofen      ONE TO TWO TABLETS EVERY 6-8 HOURS AS NEEDED        amoxicillin-clavulanate 875-125 MG per tablet    AUGMENTIN    28 tablet    Take 1 tablet by mouth 2 times daily    Acute sinusitis with symptoms > 10 days       augmented betamethasone dipropionate 0.05 % cream    DIPROLENE-AF    50 g    Apply topically 2 times daily    Venous stasis dermatitis of right lower extremity       benzonatate  200 MG capsule    TESSALON    21 capsule    Take 1 capsule (200 mg) by mouth 3 times daily as needed for cough    Cough       estradiol 0.5 MG tablet    ESTRACE    24 tablet    Place vaginally twice a week    Pessary maintenance       VITAMIN D (CHOLECALCIFEROL) PO      Take 1,000 Units by mouth daily

## 2018-10-01 NOTE — PROGRESS NOTES
Melania is a 73 year old   female who presents for pessary recheck;  she currently has a Gelhorn pessary which she keeps in place continuouslly;  she reports no problems with her pessary, no discharge, no bleeding.. She is using vaginal estrogen twice a week.    All systems were reviewed and pertinent information in noted in subjective/HPI.    Past Medical History:   Diagnosis Date     Charcot Emilee Tooth muscular atrophy 2010    Symptoms began at age 48. Difficulty with stairs, getting up from chairs, weakness in LE's but more so in the left.       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 Cunningham, [place], [facility]       Current Outpatient Prescriptions   Medication Sig Dispense Refill     ADVIL 200 MG PO CAPS ONE TO TWO TABLETS EVERY 6-8 HOURS AS NEEDED  0     augmented betamethasone dipropionate (DIPROLENE-AF) 0.05 % cream Apply topically 2 times daily 50 g 11     estradiol (ESTRACE) 0.5 MG tablet Place vaginally twice a week 24 tablet 3     VITAMIN D, CHOLECALCIFEROL, PO Take 1,000 Units by mouth daily       amoxicillin-clavulanate (AUGMENTIN) 875-125 MG per tablet Take 1 tablet by mouth 2 times daily (Patient not taking: Reported on 2018) 28 tablet 0     benzonatate (TESSALON) 200 MG capsule Take 1 capsule (200 mg) by mouth 3 times daily as needed for cough (Patient not taking: Reported on 2018) 21 capsule 0       Social History     Social History     Marital status: Single     Spouse name: N/A     Number of  "children: N/A     Years of education: N/A     Social History Main Topics     Smoking status: Never Smoker     Smokeless tobacco: Never Used     Alcohol use No     Drug use: No     Sexual activity: No     Other Topics Concern     None     Social History Narrative       Family History   Problem Relation Age of Onset     Hypertension Mother      Musculoskeletal Disorder Mother      CMT- Charcot Emilee Tooth     Cerebrovascular Disease Father      Respiratory Brother      Emphysema     Musculoskeletal Disorder Daughter      CMT- Charcot Emilee Tooth       OBJECTIVE: /78 (BP Location: Right arm, Patient Position: Chair, Cuff Size: Adult Large)  Pulse 83  Temp 98.6  F (37  C) (Tympanic)  Resp 18  Ht 5' 7.5\" (1.715 m)  Wt 252 lb (114.3 kg)  BMI 38.89 kg/m2  No LMP recorded. Patient is postmenopausal.  The pessary was removed without difficulty, cleaned in warm water and then replaced, after inspection of the vulva and vagina, which showed no erosions.    ASSESSMENT:     ICD-10-CM    1. Encounter for pessary maintenance Z46.89          PLAN: RETURN TO CLINIC 3-4 months for pessary care; continue twice a week vaginal estradiol    Lisa Quarles MD      "

## 2018-10-04 ENCOUNTER — OFFICE VISIT (OUTPATIENT)
Dept: FAMILY MEDICINE | Facility: CLINIC | Age: 73
End: 2018-10-04
Payer: COMMERCIAL

## 2018-10-04 VITALS
BODY MASS INDEX: 37.96 KG/M2 | WEIGHT: 246 LBS | OXYGEN SATURATION: 99 % | RESPIRATION RATE: 14 BRPM | TEMPERATURE: 97.3 F | DIASTOLIC BLOOD PRESSURE: 70 MMHG | HEART RATE: 70 BPM | SYSTOLIC BLOOD PRESSURE: 132 MMHG

## 2018-10-04 DIAGNOSIS — F51.01 PRIMARY INSOMNIA: Primary | ICD-10-CM

## 2018-10-04 DIAGNOSIS — L84 CALLUS OF FOOT: ICD-10-CM

## 2018-10-04 PROCEDURE — 99214 OFFICE O/P EST MOD 30 MIN: CPT | Performed by: NURSE PRACTITIONER

## 2018-10-04 RX ORDER — TRAZODONE HYDROCHLORIDE 50 MG/1
25-50 TABLET, FILM COATED ORAL
Qty: 30 TABLET | Refills: 1 | Status: SHIPPED | OUTPATIENT
Start: 2018-10-04 | End: 2018-10-11 | Stop reason: SINTOL

## 2018-10-04 NOTE — PROGRESS NOTES
SUBJECTIVE:   Melania Alicea is a 73 year old female who presents to clinic today for the following health issues:      Musculoskeletal problem/pain      Duration: 1 months     Description  Location: right foot     Intensity:  moderate    Accompanying signs and symptoms: painful and blister     History  Previous similar problem: no   Previous evaluation:  none    Precipitating or alleviating factors:  Trauma or overuse: no         Insomia       Duration: august 21     Description (location/character/radiation): stress related insomnia and cant eat     Intensity:  moderate    Accompanying signs and symptoms: weight loss     History (similar episodes/previous evaluation): moved to senior living and very stressful     Precipitating or alleviating factors: None    Therapies tried and outcome: None     Wt Readings from Last 4 Encounters:   10/04/18 246 lb (111.6 kg)   10/01/18 252 lb (114.3 kg)   06/18/18 282 lb (127.9 kg)   02/26/18 279 lb (126.6 kg)         -------------------------------------    Problem list and histories reviewed & adjusted, as indicated.  Additional history: as documented    Labs reviewed in EPIC    Reviewed and updated as needed this visit by clinical staff  Allergies  Meds       Reviewed and updated as needed this visit by Provider         ROS:   ROS: 10 point ROS neg other than the symptoms noted above in the HPI.      OBJECTIVE:                                                    /70  Pulse 70  Temp 97.3  F (36.3  C) (Tympanic)  Resp 14  Wt 246 lb (111.6 kg)  SpO2 99%  BMI 37.96 kg/m2  Body mass index is 37.96 kg/(m^2).   GENERAL: healthy, alert, well nourished, well hydrated, no distress  HENT: ear canals- normal; TMs- normal; Nose- normal; Mouth- no ulcers, no lesions  NECK: no tenderness, no adenopathy, no asymmetry, no masses, no stiffness; thyroid- normal to palpation  RESP: lungs clear to auscultation - no rales, no rhonchi, no wheezes  CV: regular rates and rhythm, normal S1  S2, no S3 or S4 and no murmur, no click or rub -  ABDOMEN: soft, no tenderness, no  hepatosplenomegaly, no masses, normal bowel sounds  SKIN: painful calluses right foot x 2 pared down. Limited sensation LE bilaterally.  Mobility aided by cane today. LE venous stasis with open sores superficial bilaterally. No cellulitis today.     Diagnostic test results:  Results for orders placed or performed in visit on 08/24/17   CK total   Result Value Ref Range    CK Total 78 30 - 225 U/L        ASSESSMENT/PLAN:                                                    1. Primary insomnia  Begin new medication  SE reviewed.   - traZODone (DESYREL) 50 MG tablet; Take 0.5-1 tablets (25-50 mg) by mouth nightly as needed for sleep  Dispense: 30 tablet; Refill: 1    2. Callus of foot  Right x 2 pared down.       Follow up with Provider - neurology as planned.   Call or return to the clinic with any worsening of symptoms or no resolution. Patient/Parent verbalized understanding and is in agreement. Medication side effects reviewed.   Current Outpatient Prescriptions   Medication Sig Dispense Refill     ADVIL 200 MG PO CAPS ONE TO TWO TABLETS EVERY 6-8 HOURS AS NEEDED  0     augmented betamethasone dipropionate (DIPROLENE-AF) 0.05 % cream Apply topically 2 times daily 50 g 11     benzonatate (TESSALON) 200 MG capsule Take 1 capsule (200 mg) by mouth 3 times daily as needed for cough 21 capsule 0     estradiol (ESTRACE) 0.5 MG tablet Place vaginally twice a week 24 tablet 3     traZODone (DESYREL) 50 MG tablet Take 0.5-1 tablets (25-50 mg) by mouth nightly as needed for sleep 30 tablet 1     VITAMIN D, CHOLECALCIFEROL, PO Take 1,000 Units by mouth daily        recheck weight loss in 1 month.   See Patient Instructions    JOANN Dorsey [facility]

## 2018-10-04 NOTE — MR AVS SNAPSHOT
After Visit Summary   10/4/2018    Melania Alicea    MRN: [id number]           Patient Information     Date Of Birth          1945        Visit Information        Provider Department      10/4/2018 1:20 PM Betty Arenas APRN [facility]        Today's Diagnoses     Primary insomnia    -  1      Care Instructions      What Is Insomnia?    Do you have trouble falling asleep? Do you wake up often during the night? Or maybe you wake up too early in the morning. You may be suffering from insomnia. Talk with your healthcare provider if it lasts longer than a few weeks and you feel tired most of the time.  What causes insomnia?  Some common causes of insomnia are:    Health problems. These may be things such as pain, depression, medicine side effects, or trouble breathing.    Circadian rhythm disorder. This is a shift in the body s normal 24-hour activity cycle.    Lifestyle factors. These can include a changing sleep schedule, lack of exercise, or too much caffeine or alcohol.    Sleep settings. This includes things such as a poor mattress, noise, or a room that s too hot or too cold.    Stress. You may be stressed about problems at work, money worries, or family events.  Talk to your healthcare provider  Describe your sleeping problems to your healthcare provider. Try to keep a daily sleep diary for a couple of weeks. Write down the time you go to bed, the time you wake up, and anything that seems to affect your sleep. Your healthcare provider can work with you to create a treatment plan. You may need to learn good sleeping habits and make some lifestyle changes. If you have any health problems, these may need to be treated first.  Date Last Reviewed: 8/1/2017 2000-2017 [employer]. [address] [place] [address], [place] [address]. All rights reserved. This information is not intended as a substitute for professional medical care. Always follow your healthcare  professional's instructions.                Follow-ups after your visit        Who to contact     If you have questions or need follow up information about today's clinic visit or your schedule please contact [facility] directly at [phone].  Normal or non-critical lab and imaging results will be communicated to you by MyChart, letter or phone within 4 business days after the clinic has received the results. If you do not hear from us within 7 days, please contact the clinic through [employer] or phone. If you have a critical or abnormal lab result, we will notify you by phone as soon as possible.  Submit refill requests through [employer] or call your pharmacy and they will forward the refill request to us. Please allow 3 business days for your refill to be completed.          Additional Information About Your Visit        MyChart Information     [employer] gives you secure access to your electronic health record. If you see a primary care provider, you can also send messages to your care team and make appointments. If you have questions, please call your primary care clinic.  If you do not have a primary care provider, please call [phone] and they will assist you.        Care EveryWhere ID     This is your Care EveryWhere ID. This could be used by other organizations to access your [place] medical records  [id number]        Your Vitals Were     Pulse Temperature Respirations Pulse Oximetry BMI (Body Mass Index)       70 97.3  F (36.3  C) (Tympanic) 14 99% 37.96 kg/m2        Blood Pressure from Last 3 Encounters:   10/04/18 132/70   10/01/18 132/78   06/18/18 140/72    Weight from Last 3 Encounters:   10/04/18 246 lb (111.6 kg)   10/01/18 252 lb (114.3 kg)   06/18/18 282 lb (127.9 kg)              Today, you had the following     No orders found for display         Today's Medication Changes          These changes are accurate as of 10/4/18  2:15 PM.  If you have any questions, ask your nurse  or doctor.               Start taking these medicines.        Dose/Directions    traZODone 50 MG tablet   Commonly known as:  DESYREL   Used for:  Primary insomnia   Started by:  Betty Arenas APRN CNP        Dose:  25-50 mg   Take 0.5-1 tablets (25-50 mg) by mouth nightly as needed for sleep   Quantity:  30 tablet   Refills:  1         Stop taking these medicines if you haven't already. Please contact your care team if you have questions.     amoxicillin-clavulanate 875-125 MG per tablet   Commonly known as:  AUGMENTIN   Stopped by:  Betty Arenas APRN CNP                Where to get your medicines      These medications were sent to [id number] - [place], [place] - [address]  [address] [place] [address]     Phone:  [phone]     traZODone 50 MG tablet                Primary Care Provider Office Phone # Fax #    JOANN Dorsey [phone]       [address] [place] [address]        Equal Access to Services     [facility]: Hadii aad ku hadasho Soomaali, waaxda luqadaha, qaybta kaalmada adeegyada, waxay idiin hayliliam ling . So [facility] [phone].    ATENCIÓN: Si habla español, tiene a ochoa disposición servicios gratuitos de asistencia lingüística. [facility] [phone].    We comply with applicable federal civil rights laws and [place] laws. We do not discriminate on the basis of race, color, national origin, age, disability, sex, sexual orientation, or gender identity.            Thank you!     Thank you for choosing [facility]  for your care. Our goal is always to provide you with excellent care. Hearing back from our patients is one way we can continue to improve our services. Please take a few minutes to complete the written survey that you may receive in the mail after your visit with us. Thank you!             Your Updated Medication List - Protect others around you: Learn how to safely use, store and throw away  your medicines at www.disposemymeds.org.          This list is accurate as of 10/4/18  2:15 PM.  Always use your most recent med list.                   Brand Name Dispense Instructions for use Diagnosis    ADVIL 200 MG capsule   Generic drug:  ibuprofen      ONE TO TWO TABLETS EVERY 6-8 HOURS AS NEEDED        augmented betamethasone dipropionate 0.05 % cream    DIPROLENE-AF    50 g    Apply topically 2 times daily    Venous stasis dermatitis of right lower extremity       benzonatate 200 MG capsule    TESSALON    21 capsule    Take 1 capsule (200 mg) by mouth 3 times daily as needed for cough    Cough       estradiol 0.5 MG tablet    ESTRACE    24 tablet    Place vaginally twice a week    Pessary maintenance       traZODone 50 MG tablet    DESYREL    30 tablet    Take 0.5-1 tablets (25-50 mg) by mouth nightly as needed for sleep    Primary insomnia       VITAMIN D (CHOLECALCIFEROL) PO      Take 1,000 Units by mouth daily

## 2018-10-04 NOTE — PATIENT INSTRUCTIONS
What Is Insomnia?    Do you have trouble falling asleep? Do you wake up often during the night? Or maybe you wake up too early in the morning. You may be suffering from insomnia. Talk with your healthcare provider if it lasts longer than a few weeks and you feel tired most of the time.  What causes insomnia?  Some common causes of insomnia are:    Health problems. These may be things such as pain, depression, medicine side effects, or trouble breathing.    Circadian rhythm disorder. This is a shift in the body s normal 24-hour activity cycle.    Lifestyle factors. These can include a changing sleep schedule, lack of exercise, or too much caffeine or alcohol.    Sleep settings. This includes things such as a poor mattress, noise, or a room that s too hot or too cold.    Stress. You may be stressed about problems at work, money worries, or family events.  Talk to your healthcare provider  Describe your sleeping problems to your healthcare provider. Try to keep a daily sleep diary for a couple of weeks. Write down the time you go to bed, the time you wake up, and anything that seems to affect your sleep. Your healthcare provider can work with you to create a treatment plan. You may need to learn good sleeping habits and make some lifestyle changes. If you have any health problems, these may need to be treated first.  Date Last Reviewed: 8/1/2017 2000-2017 [employer]. [address] [place] [address], [place] [address]. All rights reserved. This information is not intended as a substitute for professional medical care. Always follow your healthcare professional's instructions.

## 2018-10-11 ENCOUNTER — MYC MEDICAL ADVICE (OUTPATIENT)
Dept: FAMILY MEDICINE | Facility: CLINIC | Age: 73
End: 2018-10-11

## 2018-10-11 DIAGNOSIS — G47.00 PERSISTENT INSOMNIA: Primary | ICD-10-CM

## 2018-10-11 RX ORDER — ESZOPICLONE 1 MG/1
1 TABLET, FILM COATED ORAL AT BEDTIME
Qty: 10 TABLET | Refills: 0 | Status: SHIPPED | OUTPATIENT
Start: 2018-10-11 | End: 2018-10-31

## 2018-10-15 ENCOUNTER — TELEPHONE (OUTPATIENT)
Dept: FAMILY MEDICINE | Facility: CLINIC | Age: 73
End: 2018-10-15

## 2018-10-15 DIAGNOSIS — G47.00 PERSISTENT INSOMNIA: Primary | ICD-10-CM

## 2018-10-15 RX ORDER — ZOLPIDEM TARTRATE 5 MG/1
5 TABLET ORAL
Qty: 30 TABLET | Refills: 5 | Status: SHIPPED | OUTPATIENT
Start: 2018-10-15 | End: 2019-05-22

## 2018-10-15 NOTE — TELEPHONE ENCOUNTER
Could try ambien  Please contact her insurance to see if this would be covered at 5 mg daily.  RX in outbasket.  Thanks Btety Arenas [facility]

## 2018-10-15 NOTE — TELEPHONE ENCOUNTER
Prior Authorization Retail Medication Request    Medication/Dose: lunesta  ICD code (if different than what is on RX):  Persistent insomnia [G47.00  Previously Tried and Failed:  trazadone--made her hyper  Rationale:       Insurance Name:  Covermymeds [id number]  Insurance ID:         Pharmacy Information (if different than what is on RX)  Name:  [facility]  Phone:

## 2018-10-15 NOTE — TELEPHONE ENCOUNTER
Central Prior Authorization Team   Phone: [phone]    PA Initiation    Medication: lunesta  Insurance Company: [email or web address] - Phone [phone] Fax [phone]  Pharmacy Filling the Rx: [id number] - [place], [place] - [address]  Filling Pharmacy Phone: [phone]  Filling Pharmacy Fax:    Start Date: 10/15/2018

## 2018-10-15 NOTE — TELEPHONE ENCOUNTER
Prior Authorization Approval    Authorization Effective Date: 7/17/2018  Authorization Expiration Date: 10/15/2019  Medication: lunesta  Approved Dose/Quantity:    Reference #:     Insurance Company: Ana [employer] - Phone [phone] Fax [phone]  Expected CoPay:       CoPay Card Available:      Foundation Assistance Needed:    Which Pharmacy is filling the prescription (Not needed for infusion/clinic administered): [id number] - [place], [place] - [address]  Pharmacy Notified: Yes  Patient Notified: Yes

## 2018-10-31 ENCOUNTER — OFFICE VISIT (OUTPATIENT)
Dept: FAMILY MEDICINE | Facility: CLINIC | Age: 73
End: 2018-10-31
Payer: COMMERCIAL

## 2018-10-31 VITALS
WEIGHT: 237.2 LBS | SYSTOLIC BLOOD PRESSURE: 120 MMHG | HEART RATE: 72 BPM | RESPIRATION RATE: 16 BRPM | DIASTOLIC BLOOD PRESSURE: 60 MMHG | BODY MASS INDEX: 36.6 KG/M2

## 2018-10-31 DIAGNOSIS — L84 CALLUS OF FOOT: Primary | ICD-10-CM

## 2018-10-31 DIAGNOSIS — F43.23 SITUATIONAL MIXED ANXIETY AND DEPRESSIVE DISORDER: ICD-10-CM

## 2018-10-31 DIAGNOSIS — S91.301A WOUND, OPEN, FOOT, RIGHT, INITIAL ENCOUNTER: ICD-10-CM

## 2018-10-31 LAB
GRAM STN SPEC: ABNORMAL
Lab: ABNORMAL
SPECIMEN SOURCE: ABNORMAL

## 2018-10-31 PROCEDURE — 87205 SMEAR GRAM STAIN: CPT | Performed by: NURSE PRACTITIONER

## 2018-10-31 PROCEDURE — 99214 OFFICE O/P EST MOD 30 MIN: CPT | Performed by: NURSE PRACTITIONER

## 2018-10-31 PROCEDURE — 87070 CULTURE OTHR SPECIMN AEROBIC: CPT | Performed by: NURSE PRACTITIONER

## 2018-10-31 PROCEDURE — 87077 CULTURE AEROBIC IDENTIFY: CPT | Performed by: NURSE PRACTITIONER

## 2018-10-31 PROCEDURE — 87186 SC STD MICRODIL/AGAR DIL: CPT | Performed by: NURSE PRACTITIONER

## 2018-10-31 RX ORDER — CIPROFLOXACIN 500 MG/1
500 TABLET, FILM COATED ORAL 2 TIMES DAILY
Qty: 20 TABLET | Refills: 0 | Status: SHIPPED | OUTPATIENT
Start: 2018-10-31 | End: 2018-11-08

## 2018-10-31 RX ORDER — DULOXETIN HYDROCHLORIDE 30 MG/1
30 CAPSULE, DELAYED RELEASE ORAL 2 TIMES DAILY
Qty: 60 CAPSULE | Refills: 1 | Status: SHIPPED | OUTPATIENT
Start: 2018-10-31 | End: 2019-02-19 | Stop reason: SINTOL

## 2018-10-31 ASSESSMENT — PATIENT HEALTH QUESTIONNAIRE - PHQ9
SUM OF ALL RESPONSES TO PHQ QUESTIONS 1-9: 22
SUM OF ALL RESPONSES TO PHQ QUESTIONS 1-9: 22
10. IF YOU CHECKED OFF ANY PROBLEMS, HOW DIFFICULT HAVE THESE PROBLEMS MADE IT FOR YOU TO DO YOUR WORK, TAKE CARE OF THINGS AT HOME, OR GET ALONG WITH OTHER PEOPLE: VERY DIFFICULT

## 2018-10-31 NOTE — NURSING NOTE
"Chief Complaint   Patient presents with     Depression     Foot Problems     blisters       Initial /60 (BP Location: Right arm, Cuff Size: Adult Large)  Pulse 72  Resp 16  Wt 237 lb 3.2 oz (107.6 kg)  BMI 36.6 kg/m2 Estimated body mass index is 36.6 kg/(m^2) as calculated from the following:    Height as of 10/1/18: 5' 7.5\" (1.715 m).    Weight as of this encounter: 237 lb 3.2 oz (107.6 kg).    Patient presents to the clinic using [employer] Maintenance that is potentially due pending provider review:  NONE    n/a    Is there anyone who you would like to be able to receive your results? Not Applicable  If yes have patient fill out CHAU    Emile Lucas M.A.    "

## 2018-10-31 NOTE — MR AVS SNAPSHOT
After Visit Summary   10/31/2018    Melania Alicea    MRN: [id number]           Patient Information     Date Of Birth          1945        Visit Information        Provider Department      10/31/2018 12:40 PM Betty Arenas APRN CNP [facility]        Today's Diagnoses     Callus of foot    -  1    Wound, open, foot, right, initial encounter        Situational mixed anxiety and depressive disorder          Care Instructions    Most common side effects reported with duloxetine include nausea, dry mouth, insomnia, drowsiness, constipation, fatigue, and dizziness. Side effects are reduced by administering duloxetine 30 mg once daily for one week before increasing to 60 mg once daily.    Take duloxetine 30 mg daily for 7 days then increase to twice daily as tolerated.   Cipro 500 mg twice daily for 10 days.   Wash daily with soap and water.   Follow up appointment with Dr Mayo for callus debridement.               Follow-ups after your visit        Future tests that were ordered for you today     Open Future Orders        Priority Expected Expires Ordered    Wound Culture Aerobic Bacterial Routine  10/31/2019 10/31/2018    Gram stain Routine  10/31/2019 10/31/2018            Who to contact     If you have questions or need follow up information about today's clinic visit or your schedule please contact [facility] directly at [phone].  Normal or non-critical lab and imaging results will be communicated to you by MyChart, letter or phone within 4 business days after the clinic has received the results. If you do not hear from us within 7 days, please contact the clinic through MyChart or phone. If you have a critical or abnormal lab result, we will notify you by phone as soon as possible.  Submit refill requests through [employer] or call your pharmacy and they will forward the refill request to us. Please allow 3 business days for your refill to be  completed.          Additional Information About Your Visit        Avto[employer] Information     [employer] gives you secure access to your electronic health record. If you see a primary care provider, you can also send messages to your care team and make appointments. If you have questions, please call your primary care clinic.  If you do not have a primary care provider, please call [phone] and they will assist you.        Care EveryWhere ID     This is your Care EveryWhere ID. This could be used by other organizations to access your [place] medical records  [id number]        Your Vitals Were     Pulse Respirations BMI (Body Mass Index)             72 16 36.6 kg/m2          Blood Pressure from Last 3 Encounters:   10/31/18 120/60   10/04/18 132/70   10/01/18 132/78    Weight from Last 3 Encounters:   10/31/18 237 lb 3.2 oz (107.6 kg)   10/04/18 246 lb (111.6 kg)   10/01/18 252 lb (114.3 kg)                 Today's Medication Changes          These changes are accurate as of 10/31/18  1:42 PM.  If you have any questions, ask your nurse or doctor.               Start taking these medicines.        Dose/Directions    ciprofloxacin 500 MG tablet   Commonly known as:  CIPRO   Used for:  Wound, open, foot, right, initial encounter   Started by:  Betty Arenas APRN CNP        Dose:  500 mg   Take 1 tablet (500 mg) by mouth 2 times daily   Quantity:  20 tablet   Refills:  0       DULoxetine 30 MG EC capsule   Commonly known as:  CYMBALTA   Used for:  Situational mixed anxiety and depressive disorder   Started by:  Betty Arenas APRN CNP        Dose:  30 mg   Take 1 capsule (30 mg) by mouth 2 times daily   Quantity:  60 capsule   Refills:  1            Where to get your medicines      These medications were sent to [id number] - [place], [place] - [address]  [address] [address]     Phone:  [phone]     ciprofloxacin 500 MG tablet    DULoxetine 30 MG EC capsule                Primary Care  Provider Office Phone # Fax #    Betty Arenas, JOANN MONICA [phone]       [address] [place] [address]        Equal Access to Services     ALICIA DUNCAN : Hadii aad ku hadgloriaperry Larry, wajenniferda mercyqkelseyha, qanettieta kaalmada brittny, kyle jerilynin hayaacassi vallejokailee jacobson anuradha mccarthy. So [facility] [phone].    ATENCIÓN: Si habla español, tiene a ochoa disposición servicios gratuitos de asistencia lingüística. Llame al [phone].    We comply with applicable federal civil rights laws and [place] laws. We do not discriminate on the basis of race, color, national origin, age, disability, sex, sexual orientation, or gender identity.            Thank you!     Thank you for choosing [facility]  for your care. Our goal is always to provide you with excellent care. Hearing back from our patients is one way we can continue to improve our services. Please take a few minutes to complete the written survey that you may receive in the mail after your visit with us. Thank you!             Your Updated Medication List - Protect others around you: Learn how to safely use, store and throw away your medicines at www.disposemymeds.org.          This list is accurate as of 10/31/18  1:42 PM.  Always use your most recent med list.                   Brand Name Dispense Instructions for use Diagnosis    ADVIL 200 MG capsule   Generic drug:  ibuprofen      ONE TO TWO TABLETS EVERY 6-8 HOURS AS NEEDED        augmented betamethasone dipropionate 0.05 % cream    DIPROLENE-AF    50 g    Apply topically 2 times daily    Venous stasis dermatitis of right lower extremity       ciprofloxacin 500 MG tablet    CIPRO    20 tablet    Take 1 tablet (500 mg) by mouth 2 times daily    Wound, open, foot, right, initial encounter       DULoxetine 30 MG EC capsule    CYMBALTA    60 capsule    Take 1 capsule (30 mg) by mouth 2 times daily    Situational mixed anxiety and depressive disorder       estradiol 0.5 MG tablet     ESTRACE    24 tablet    Place vaginally twice a week    Pessary maintenance       VITAMIN D (CHOLECALCIFEROL) PO      Take 1,000 Units by mouth daily        zolpidem 5 MG tablet    AMBIEN    30 tablet    Take 1 tablet (5 mg) by mouth nightly as needed for sleep    Persistent insomnia

## 2018-10-31 NOTE — PROGRESS NOTES
SUBJECTIVE:   Melania Alicea is a 73 year old female who presents to clinic today for the following health issues:    Insomnia Followup    Status since last visit: staying the same as last visit    See PHQ-9 for current symptoms.  Other associated symptoms: None    Complicating factors:   Significant life event:  Yes-  Resulted from a move   Current substance abuse:  None  Anxiety or Panic symptoms:  Yes-  Both   Better with ambien    PHQ 10/31/2018   PHQ-9 Total Score 22   Q9: Suicide Ideation More than half the days   F/U: Thoughts of suicide or self-harm Yes   F/U: Safety concerns No     In the past two weeks have you had thoughts of suicide or self-harm?  No.    Do you have concerns about your personal safety or the safety of others?   No  PHQ-9  English  PHQ-9   Any Language  Suicide Assessment Five-step Evaluation and Treatment (SAFE-T)    Amount of exercise or physical activity: None    Problems taking medications regularly: No    Medication side effects: none    Diet: regular (no restrictions)      Concern - Blisters on foot   Onset: since 10/4    Description:   Blisters on the bottom of right foot- 2.  Is due to shoes being too narrow.     Intensity: mild    Progression of Symptoms:  same    Accompanying Signs & Symptoms:  Does have some swelling sometimes     Previous history of similar problem:   Yes     Precipitating factors:   Worsened by: narrow shoes     Alleviating factors:  Improved by: na     Therapies Tried and outcome: compression socks     Answers for HPI/ROS submitted by the patient on 10/31/2018   If you checked off any problems, how difficult have these problems made it for you to do your work, take care of things at home, or get along with other people?: Very difficult  PHQ9 TOTAL SCORE: 22      -------------------------------------    Problem list and histories reviewed & adjusted, as indicated.  Additional history: as documented    Labs reviewed in EPIC    Reviewed and updated as needed  this visit by clinical staff       Reviewed and updated as needed this visit by Provider         ROS:   ROS: 10 point ROS neg other than the symptoms noted above in the HPI.      OBJECTIVE:                                                    /60 (BP Location: Right arm, Cuff Size: Adult Large)  Pulse 72  Resp 16  Wt 237 lb 3.2 oz (107.6 kg)  BMI 36.6 kg/m2  Body mass index is 36.6 kg/(m^2).   GENERAL: healthy, alert, well nourished, well hydrated, no distress  HENT: ear canals- normal; TMs- normal; Nose- normal; Mouth- no ulcers, no lesions  NECK: no tenderness, no adenopathy, no asymmetry, no masses, no stiffness; thyroid- normal to palpation  RESP: lungs clear to auscultation - no rales, no rhonchi, no wheezes  CV: regular rates and rhythm, normal S1 S2, no S3 or S4 and no murmur, no click or rub -  ABDOMEN: soft, no tenderness, no  hepatosplenomegaly, no masses, normal bowel sounds   foot exam: normal DP and PT pulses, no trophic changes or ulcerative lesions and normal sensory exam large callus with drainage yellowish green base of the right great toe    Diagnostic test results:  Results for orders placed or performed in visit on 10/31/18   Wound Culture Aerobic Bacterial   Result Value Ref Range    Specimen Description Right Foot Wound     Special Requests Specimen collected in eSwab transport (white cap)     Culture Micro Moderate growth  Staphylococcus aureus   (A)     Culture Micro Light growth  Normal skin avril          Susceptibility    Staphylococcus aureus - ERICA     CLINDAMYCIN <=0.25 Sensitive ug/mL     ERYTHROMYCIN <=0.25 Sensitive ug/mL     GENTAMICIN <=0.5 Sensitive ug/mL     OXACILLIN 0.5 Sensitive ug/mL     PENICILLIN >=0.5 Resistant ug/mL     TETRACYCLINE <=1 Sensitive ug/mL     Trimethoprim/Sulfa <=0.5/9.5 Sensitive ug/mL     VANCOMYCIN 1 Sensitive ug/mL   Gram stain   Result Value Ref Range    Specimen Description Right Foot Wound     Special Requests Specimen collected in eSwab  transport (white cap)     Gram Stain Many  Gram positive cocci   (A)     Gram Stain Moderate  Gram negative rods   (A)     Gram Stain Few  WBC'S seen  PMNs seen           ASSESSMENT/PLAN:                                                    1. Callus of foot  Needs debridement.  Podiatry consultation scheduled.    2. Wound, open, foot, right, initial encounter  Culture done today.  We will call with results as they become available  - Wound Culture Aerobic Bacterial; Future  - Gram stain; Future  - Wound Culture Aerobic Bacterial  - Gram stain  Wash foot daily with soap and water  Begin Cipro 500 mg twice daily for 10 days    3. Situational mixed anxiety and depressive disorder  Begin new medication start 30 mg daily for the next 1 weeks then increase to twice daily as tolerated  - DULoxetine (CYMBALTA) 30 MG EC capsule; Take 1 capsule (30 mg) by mouth 2 times daily  Dispense: 60 capsule; Refill: 1  Psychotherapy recommended  Verbal no harm contract generated  Daughter will check in frequently over the next month to make sure that this is not intensified.  We will need to watch for C. difficile if antibiotics are needed due to previous history    Follow up with Provider -podiatry  Call or return to the clinic with any worsening of symptoms or no resolution. Patient/Parent verbalized understanding and is in agreement. Medication side effects reviewed.   Current Outpatient Prescriptions   Medication Sig Dispense Refill     ADVIL 200 MG PO CAPS ONE TO TWO TABLETS EVERY 6-8 HOURS AS NEEDED  0     augmented betamethasone dipropionate (DIPROLENE-AF) 0.05 % cream Apply topically 2 times daily 50 g 11     DULoxetine (CYMBALTA) 30 MG EC capsule Take 1 capsule (30 mg) by mouth 2 times daily 60 capsule 1     estradiol (ESTRACE) 0.5 MG tablet Place vaginally twice a week 24 tablet 3     VITAMIN D, CHOLECALCIFEROL, PO Take 1,000 Units by mouth daily       zolpidem (AMBIEN) 5 MG tablet Take 1 tablet (5 mg) by mouth nightly as  needed for sleep 30 tablet 5     clindamycin (CLEOCIN) 150 MG capsule Take 1 capsule (150 mg) by mouth 3 times daily 30 capsule 0     order for DME Equipment being ordered: JAQUAN walker shoe: do not remove plugs 1 Units 0       See Patient Instructions    JOANN Dorsey [facility]

## 2018-10-31 NOTE — PATIENT INSTRUCTIONS
Most common side effects reported with duloxetine include nausea, dry mouth, insomnia, drowsiness, constipation, fatigue, and dizziness. Side effects are reduced by administering duloxetine 30 mg once daily for one week before increasing to 60 mg once daily.    Take duloxetine 30 mg daily for 7 days then increase to twice daily as tolerated.   Cipro 500 mg twice daily for 10 days.   Wash daily with soap and water.   Follow up appointment with Dr Mayo for callus debridement.

## 2018-11-01 ENCOUNTER — MYC MEDICAL ADVICE (OUTPATIENT)
Dept: FAMILY MEDICINE | Facility: CLINIC | Age: 73
End: 2018-11-01

## 2018-11-01 ASSESSMENT — PATIENT HEALTH QUESTIONNAIRE - PHQ9: SUM OF ALL RESPONSES TO PHQ QUESTIONS 1-9: 22

## 2018-11-03 LAB
BACTERIA SPEC CULT: ABNORMAL
BACTERIA SPEC CULT: ABNORMAL
Lab: ABNORMAL
SPECIMEN SOURCE: ABNORMAL

## 2018-11-07 ENCOUNTER — OFFICE VISIT (OUTPATIENT)
Dept: PODIATRY | Facility: CLINIC | Age: 73
End: 2018-11-07
Payer: COMMERCIAL

## 2018-11-07 ENCOUNTER — MYC MEDICAL ADVICE (OUTPATIENT)
Dept: FAMILY MEDICINE | Facility: CLINIC | Age: 73
End: 2018-11-07

## 2018-11-07 VITALS — DIASTOLIC BLOOD PRESSURE: 75 MMHG | RESPIRATION RATE: 20 BRPM | SYSTOLIC BLOOD PRESSURE: 127 MMHG | HEART RATE: 81 BPM

## 2018-11-07 DIAGNOSIS — L97.511 SKIN ULCER OF RIGHT FOOT, LIMITED TO BREAKDOWN OF SKIN (H): ICD-10-CM

## 2018-11-07 DIAGNOSIS — G60.0 CHARCOT MARIE TOOTH MUSCULAR ATROPHY: Primary | ICD-10-CM

## 2018-11-07 DIAGNOSIS — A49.01 STAPH AUREUS INFECTION: Primary | ICD-10-CM

## 2018-11-07 DIAGNOSIS — L08.9 FOOT INFECTION: Primary | ICD-10-CM

## 2018-11-07 DIAGNOSIS — A49.01 STAPH AUREUS INFECTION: ICD-10-CM

## 2018-11-07 PROCEDURE — 99203 OFFICE O/P NEW LOW 30 MIN: CPT | Mod: 25 | Performed by: PODIATRIST

## 2018-11-07 PROCEDURE — 11056 PARNG/CUTG B9 HYPRKR LES 2-4: CPT | Performed by: PODIATRIST

## 2018-11-07 RX ORDER — ERYTHROMYCIN 333 MG/1
333 TABLET, DELAYED RELEASE ORAL 3 TIMES DAILY
Qty: 30 TABLET | Refills: 0 | Status: SHIPPED | OUTPATIENT
Start: 2018-11-07 | End: 2018-11-07

## 2018-11-07 NOTE — PROGRESS NOTES
Melania Alicea is a 73 year old female who presents to the office with a chief complaint of an ulcer on the bottom of the right foot.  The patient relates the ulcer has been present for the past several weeks.  The patient denies any fever, chills, nausea or vomiting.  The patient denies any pus or blood draining from the ulcer.  The patient denies any redness or swelling around the ulcer.  The patient denies of any pain involving the ulcer.  The patient denies any previous ulceration on either foot or leg.  The patient has a significant history of Charcot-Emilee-Tooth disease.    REVIEW OF SYSTEMS:  Constitutional, HEENT, cardiovascular, pulmonary, GI, , musculoskeletal, neuro, skin, endocrine and psych systems are negative, except as otherwise noted.     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 Difficulty with stairs, getting up from chairs, weakness in LE's but more so in the left.       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 Cunningham, [place], Right        MEDICATIONS:   Current Outpatient Prescriptions:      ADVIL 200 MG PO CAPS, ONE TO TWO TABLETS EVERY 6-8 HOURS AS NEEDED, Disp: , Rfl: 0     augmented betamethasone dipropionate (DIPROLENE-AF) 0.05 % cream, Apply topically 2 times daily, Disp: 50 g, Rfl: 11     ciprofloxacin (CIPRO) 500 MG tablet, Take 1 tablet (500 mg) by mouth 2 times daily,  Disp: 20 tablet, Rfl: 0     DULoxetine (CYMBALTA) 30 MG EC capsule, Take 1 capsule (30 mg) by mouth 2 times daily, Disp: 60 capsule, Rfl: 1     estradiol (ESTRACE) 0.5 MG tablet, Place vaginally twice a week, Disp: 24 tablet, Rfl: 3     VITAMIN D, CHOLECALCIFEROL, PO, Take 1,000 Units by mouth daily, Disp: , Rfl:      zolpidem (AMBIEN) 5 MG tablet, Take 1 tablet (5 mg) by mouth nightly as needed for sleep, Disp: 30 tablet, Rfl: 5     ALLERGIES:    Allergies   Allergen Reactions     Nickel Hives     Jewelry       Sulfa Drugs Rash        SOCIAL HISTORY:   Social History     Social History     Marital status: Single     Spouse name: N/A     Number of children: N/A     Years of education: N/A     Occupational History     Not on file.     Social History Main Topics     Smoking status: Never Smoker     Smokeless tobacco: Never Used     Alcohol use No     Drug use: No     Sexual activity: No     Other Topics Concern     Not on file     Social History Narrative        FAMILY HISTORY:   Family History   Problem Relation Age of Onset     Hypertension Mother      Musculoskeletal Disorder Mother      CMT- Charcot Emilee Tooth     Cerebrovascular Disease Father      Respiratory Brother      Emphysema     Musculoskeletal Disorder Daughter      CMT- Charcot Emilee Tooth        EXAM:Vitals: /75 (BP Location: Right arm, Patient Position: Sitting, Cuff Size: Adult Large)  Pulse 81  Resp 20  BMI= There is no height or weight on file to calculate BMI.       General appearance: Patient is alert and fully cooperative with history & exam.  No sign of distress is noted during the visit.     Psychiatric: Affect is pleasant & appropriate.  Patient appears motivated to improve health.     Respiratory: Breathing is regular & unlabored while sitting.     HEENT: Hearing is intact to spoken word.  Speech is clear.  No gross evidence of visual impairment that would impact ambulation.     Dermatologic:  One notes grade II ulceration located  on the plantar aspect of the foot underlying the first metatarsal on the right.  The ulcer measures out to be approximately 1 cm x 0.5 cm x 0.2 cm.  One notes negative probing to bone, negative tracking.  The subcutaneous granular base appears beefy red with hyperkeratotic wound borders as well as a slight serous drainage and absence of mal odor.  There is no noted swelling, purulent drainage or ascending erythema.    Vascular: DP & PT pulses are intact & regular bilaterally.  No significant edema or varicosities noted.  CFT and skin temperature is normal to both lower extremities.     Neurologic: Lower extremity sensation is absent to light touch.  Noted hammertoe deformity bunion deformity noted on the right foot.  Noted evidence of weakness or contracture in the lower extremities.  Noted evidence of neuropathy.     Musculoskeletal: Patient is ambulatory without assistive device or brace.  No gross ankle deformity noted.  No foot or ankle joint effusion is noted.    Noted hammertoe and bunion deformity bilaterally.       Assessment: 1.  Charcot-Emilee-Tooth disease    2.  Ulceration on the right foot.    3.  Callus right foot    Plan:  I have explained to Melania the condition of the diabetic malperforans ulceration.  After verbal consent, #15 blade was used to debride ulcer down to and including subcutaneous tissue.  Bleeding controlled with light pressure.   No drainage noted.  No anesthesia was used due to neuropathy. Dry dressing applied to foot.  Patient tolerated procedure well.  I explained to her  potential risks infection and loss of tissue, including loss of limb.  The callus lesions were sharply debrided with a #10 blade.  The patient was prescribed a DH walker shoe that will aid in offloading the tension forces to the soft tissues and prevent further tissue destruction.  The patient will follow up in two weeks for further evaluation.        DULCE Mayo D.P.M., FRADHA.F.A.S.

## 2018-11-07 NOTE — MR AVS SNAPSHOT
After Visit Summary   11/7/2018    Melania Alicea    MRN: [id number]           Patient Information     Date Of Birth          1945        Visit Information        Provider Department      11/7/2018 2:00 PM Yair Mayo DPM [facility]        Today's Diagnoses     Charcot Emilee Tooth muscular atrophy    -  1    Skin ulcer of right foot, limited to breakdown of skin (H)          Care Instructions    FOOT ULCER (WOUND) EDUCATION  Ulceration ofthe foot involves a break or hole in the skin. Skin is our best protection against infection. Skin is quite durable, however, the underlying tissues are fragile. For this reason, the wound is likely to deepen rapidly. Deep wounds usually get infected and require amputation. Prompt healing is therefore essential to avoid limb loss.     Foot ulcers do not heal without intervention. Walking on the foot and living your normal life is not typically compatible with healing the sore. Successful healing will require several months of significant alteration of your daily activities.   Ulcer complications frequently develop. This primarily includes infection of skin, which then spreads deep into your joints, bones and tendons. Spreading infection may travel up your leg and into other parts of your body. Deep infection is usually treated with amputation ofpart ofyour foot or your leg. Signs of infection include fever, chills, nausea, vomiting, erratic blood sugars, local redness, pus, strong odor and localized warmth. Signs of infection should be taken seriously. Prompt evaluation in the clinic or hospital emergency room is required.   Ulcer treatment requires debridement or surgical removal of devitalized tissue. Your doctor will trim away callused, moistened, unhealthy tissue from the wound surface and margin. This helps to clean the wound and allows proper inspection. Debridement also stimulates healing even though the wound originally appears  larger. Expect some bleeding with each debridement. You will be given instruction regarding wound bandaging. This often includes ointment and gauze. Avoid tape directly on the skin. Hand washing is essential since most infections will come from your fingertips. Ulcer care requires a no touch technique. Your fingers should not touch the margin or base of the wound.    HELPFUL HEALING TIPS:  1. Debridement: Getting rid of bad tissue makes way for good tissue to promote healing  2. Addressing Foot Deformities: Hammertoes and bunions can cause increased pressure  3. Pressure Reduction: If pressure remains to the wound, it won't heal  4. Good Pulses: If bloodflow is not getting to the foot, the ulcer will not heal  5. Good Nutrition: If you are not getting proper nutrition your body can't heal.Protein!  6. Infection Control: Keep the ulcer clean with wound cleanser. DO NOT SOAK IT!  7. Moisture Control: Keep edema down and make sure that drainage is getting pulled away from the ulcer    IMPORTANCE OF DEBRIDEMENT    Reduces bioburden to help control or reduce infection. Even if an ulcer is not  infected,  the bacterial bioburden causes increased local inflammation.    Allows more accurate visualization of the wound base and edges, which allows for more precise staging.    Removes necrotic/non-viable tissue, which impedes wound healing, causes protein loss and can be a nidus for infection.    Stimulates new circulation (angiogenesis) and allows adequate oxygen delivery to the wound.    Removes undermining and tunneling, and may help reduce abscess formation.    Releases healing growth factors at the edge of the wound.    Prepares the wound bed by leaving only tissues that are capable of regenerating.              Follow-ups after your visit        Follow-up notes from your care team     Return in about 4 weeks (around 12/5/2018).      Who to contact     If you have questions or need follow up information about today's  clinic visit or your schedule please contact [facility] directly at [phone].  Normal or non-critical lab and imaging results will be communicated to you by MyChart, letter or phone within 4 business days after the clinic has received the results. If you do not hear from us within 7 days, please contact the clinic through [employer] or phone. If you have a critical or abnormal lab result, we will notify you by phone as soon as possible.  Submit refill requests through [facility] or call your pharmacy and they will forward the refill request to us. Please allow 3 business days for your refill to be completed.          Additional Information About Your Visit        [employer] Information     [facility] gives you secure access to your electronic health record. If you see a primary care provider, you can also send messages to your care team and make appointments. If you have questions, please call your primary care clinic.  If you do not have a primary care provider, please call [phone] and they will assist you.        Care EveryWhere ID     This is your Care EveryWhere ID. This could be used by other organizations to access your [facility] medical records  [id number]        Your Vitals Were     Pulse Respirations                81 20           Blood Pressure from Last 3 Encounters:   11/07/18 127/75   10/31/18 120/60   10/04/18 132/70    Weight from Last 3 Encounters:   10/31/18 237 lb 3.2 oz (107.6 kg)   10/04/18 246 lb (111.6 kg)   10/01/18 252 lb (114.3 kg)              We Performed the Following     TRIM HYPERKERATOTIC SKIN LESION,2-4          Today's Medication Changes          These changes are accurate as of 11/7/18  2:28 PM.  If you have any questions, ask your nurse or doctor.               Start taking these medicines.        Dose/Directions    order for DME   Used for:  Skin ulcer of right foot, limited to breakdown of skin (H), Charcot Emilee Tooth muscular atrophy   Started by:  Yair Mayo  KAMRAN Mancilla        Equipment being ordered: DH walker shoe: do not remove plugs   Quantity:  1 Units   Refills:  0            Where to get your medicines      Some of these will need a paper prescription and others can be bought over the counter.  Ask your nurse if you have questions.     Bring a paper prescription for each of these medications     order for DME                Primary Care Provider Office Phone # Fax #    JOANN Dorsey [phone]       [address] [facility] [address]        Equal Access to Services     ALICIA DUNCAN : Hadii aad ku hadasho Soomaali, waaxda luqadaha, qaybta kaalmada adeegyada, waxay idiin hayaan adeeg kharash laabimbola . So [facility] [phone].    ATENCIÓN: Si habla español, tiene a ochoa disposición servicios gratuitos de asistencia lingüística. [facility] [phone].    We comply with applicable federal civil rights laws and [place] laws. We do not discriminate on the basis of race, color, national origin, age, disability, sex, sexual orientation, or gender identity.            Thank you!     Thank you for choosing [facility]  for your care. Our goal is always to provide you with excellent care. Hearing back from our patients is one way we can continue to improve our services. Please take a few minutes to complete the written survey that you may receive in the mail after your visit with us. Thank you!             Your Updated Medication List - Protect others around you: Learn how to safely use, store and throw away your medicines at www.disposemymeds.org.          This list is accurate as of 11/7/18  2:28 PM.  Always use your most recent med list.                   Brand Name Dispense Instructions for use Diagnosis    ADVIL 200 MG capsule   Generic drug:  ibuprofen      ONE TO TWO TABLETS EVERY 6-8 HOURS AS NEEDED        augmented betamethasone dipropionate 0.05 % cream    DIPROLENE-AF    50 g    Apply topically 2 times daily     Venous stasis dermatitis of right lower extremity       ciprofloxacin 500 MG tablet    CIPRO    20 tablet    Take 1 tablet (500 mg) by mouth 2 times daily    Wound, open, foot, right, initial encounter       DULoxetine 30 MG EC capsule    CYMBALTA    60 capsule    Take 1 capsule (30 mg) by mouth 2 times daily    Situational mixed anxiety and depressive disorder       estradiol 0.5 MG tablet    ESTRACE    24 tablet    Place vaginally twice a week    Pessary maintenance       order for DME     1 Units    Equipment being ordered:  walker shoe: do not remove plugs    Skin ulcer of right foot, limited to breakdown of skin (H), Charcot Emilee Tooth muscular atrophy       VITAMIN D (CHOLECALCIFEROL) PO      Take 1,000 Units by mouth daily        zolpidem 5 MG tablet    AMBIEN    30 tablet    Take 1 tablet (5 mg) by mouth nightly as needed for sleep    Persistent insomnia

## 2018-11-07 NOTE — LETTER
11/7/2018         RE: Melania Alicea  [address]  [place] [address]        Dear Colleague,    Thank you for referring your patient, Melania Alicea, to the [facility]. Please see a copy of my visit note below.    Melania Alicea is a 73 year old female who presents to the office with a chief complaint of an ulcer on the bottom of the right foot.  The patient relates the ulcer has been present for the past several weeks.  The patient denies any fever, chills, nausea or vomiting.  The patient denies any pus or blood draining from the ulcer.  The patient denies any redness or swelling around the ulcer.  The patient denies of any pain involving the ulcer.  The patient denies any previous ulceration on either foot or leg.  The patient has a significant history of Charcot-Emilee-Tooth disease.    REVIEW OF SYSTEMS:  Constitutional, HEENT, cardiovascular, pulmonary, GI, , musculoskeletal, neuro, skin, endocrine and psych systems are negative, except as otherwise noted.     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 Difficulty with stairs, getting up from chairs, weakness in LE's but more so in the left.       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 Cunningham, [place], Right        MEDICATIONS:   Current Outpatient Prescriptions:      ADVIL 200 MG PO CAPS, ONE TO TWO TABLETS EVERY 6-8  HOURS AS NEEDED, Disp: , Rfl: 0     augmented betamethasone dipropionate (DIPROLENE-AF) 0.05 % cream, Apply topically 2 times daily, Disp: 50 g, Rfl: 11     ciprofloxacin (CIPRO) 500 MG tablet, Take 1 tablet (500 mg) by mouth 2 times daily, Disp: 20 tablet, Rfl: 0     DULoxetine (CYMBALTA) 30 MG EC capsule, Take 1 capsule (30 mg) by mouth 2 times daily, Disp: 60 capsule, Rfl: 1     estradiol (ESTRACE) 0.5 MG tablet, Place vaginally twice a week, Disp: 24 tablet, Rfl: 3     VITAMIN D, CHOLECALCIFEROL, PO, Take 1,000 Units by mouth daily, Disp: , Rfl:      zolpidem (AMBIEN) 5 MG tablet, Take 1 tablet (5 mg) by mouth nightly as needed for sleep, Disp: 30 tablet, Rfl: 5     ALLERGIES:    Allergies   Allergen Reactions     Nickel Hives     Jewelry       Sulfa Drugs Rash        SOCIAL HISTORY:   Social History     Social History     Marital status: Single     Spouse name: N/A     Number of children: N/A     Years of education: N/A     Occupational History     Not on file.     Social History Main Topics     Smoking status: Never Smoker     Smokeless tobacco: Never Used     Alcohol use No     Drug use: No     Sexual activity: No     Other Topics Concern     Not on file     Social History Narrative        FAMILY HISTORY:   Family History   Problem Relation Age of Onset     Hypertension Mother      Musculoskeletal Disorder Mother      CMT- Charcot Emilee Tooth     Cerebrovascular Disease Father      Respiratory Brother      Emphysema     Musculoskeletal Disorder Daughter      CMT- Charcot Emilee Tooth        EXAM:Vitals: /75 (BP Location: Right arm, Patient Position: Sitting, Cuff Size: Adult Large)  Pulse 81  Resp 20  BMI= There is no height or weight on file to calculate BMI.       General appearance: Patient is alert and fully cooperative with history & exam.  No sign of distress is noted during the visit.     Psychiatric: Affect is pleasant & appropriate.  Patient appears motivated to improve health.      Respiratory: Breathing is regular & unlabored while sitting.     HEENT: Hearing is intact to spoken word.  Speech is clear.  No gross evidence of visual impairment that would impact ambulation.     Dermatologic:  One notes grade II ulceration located on the plantar aspect of the foot underlying the first metatarsal on the right.  The ulcer measures out to be approximately 1 cm x 0.5 cm x 0.2 cm.  One notes negative probing to bone, negative tracking.  The subcutaneous granular base appears beefy red with hyperkeratotic wound borders as well as a slight serous drainage and absence of mal odor.  There is no noted swelling, purulent drainage or ascending erythema.    Vascular: DP & PT pulses are intact & regular bilaterally.  No significant edema or varicosities noted.  CFT and skin temperature is normal to both lower extremities.     Neurologic: Lower extremity sensation is absent to light touch.  Noted hammertoe deformity bunion deformity noted on the right foot.  Noted evidence of weakness or contracture in the lower extremities.  Noted evidence of neuropathy.     Musculoskeletal: Patient is ambulatory without assistive device or brace.  No gross ankle deformity noted.  No foot or ankle joint effusion is noted.    Noted hammertoe and bunion deformity bilaterally.       Assessment: 1.  Charcot-Emilee-Tooth disease    2.  Ulceration on the right foot.    3.  Callus right foot    Plan:  I have explained to Melania the condition of the diabetic malperforans ulceration.  After verbal consent, #15 blade was used to debride ulcer down to and including subcutaneous tissue.  Bleeding controlled with light pressure.   No drainage noted.  No anesthesia was used due to neuropathy. Dry dressing applied to foot.  Patient tolerated procedure well.  I explained to her  potential risks infection and loss of tissue, including loss of limb.  The callus lesions were sharply debrided with a #10 blade.  The patient was prescribed a   walker shoe that will aid in offloading the tension forces to the soft tissues and prevent further tissue destruction.  The patient will follow up in two weeks for further evaluation.        DULCE Mayo D.P.M., F.A.C.F.A.S.    Again, thank you for allowing me to participate in the care of your patient.        Sincerely,        Yair Mayo DPM

## 2018-11-08 RX ORDER — CLINDAMYCIN HCL 150 MG
150 CAPSULE ORAL 3 TIMES DAILY
Qty: 30 CAPSULE | Refills: 0 | Status: SHIPPED | OUTPATIENT
Start: 2018-11-08 | End: 2019-02-19

## 2019-02-19 ENCOUNTER — OFFICE VISIT (OUTPATIENT)
Dept: FAMILY MEDICINE | Facility: CLINIC | Age: 74
End: 2019-02-19
Payer: COMMERCIAL

## 2019-02-19 VITALS
HEART RATE: 73 BPM | DIASTOLIC BLOOD PRESSURE: 82 MMHG | TEMPERATURE: 98.7 F | WEIGHT: 206 LBS | SYSTOLIC BLOOD PRESSURE: 111 MMHG | BODY MASS INDEX: 31.22 KG/M2 | HEIGHT: 68 IN

## 2019-02-19 DIAGNOSIS — I89.0 LYMPHEDEMA: ICD-10-CM

## 2019-02-19 DIAGNOSIS — R53.83 FATIGUE DUE TO DEPRESSION: ICD-10-CM

## 2019-02-19 DIAGNOSIS — L65.9 HAIR LOSS: ICD-10-CM

## 2019-02-19 DIAGNOSIS — F32.0 MILD MAJOR DEPRESSION (H): ICD-10-CM

## 2019-02-19 DIAGNOSIS — L29.9 PRURITIC DISORDER: Primary | ICD-10-CM

## 2019-02-19 DIAGNOSIS — N81.3 UTEROVAGINAL PROLAPSE, COMPLETE: ICD-10-CM

## 2019-02-19 DIAGNOSIS — F32.A FATIGUE DUE TO DEPRESSION: ICD-10-CM

## 2019-02-19 LAB
ALBUMIN SERPL-MCNC: 3.7 G/DL (ref 3.4–5)
ALP SERPL-CCNC: 91 U/L (ref 40–150)
ALT SERPL W P-5'-P-CCNC: 16 U/L (ref 0–50)
ANION GAP SERPL CALCULATED.3IONS-SCNC: 7 MMOL/L (ref 3–14)
AST SERPL W P-5'-P-CCNC: 11 U/L (ref 0–45)
BASOPHILS # BLD AUTO: 0 10E9/L (ref 0–0.2)
BASOPHILS NFR BLD AUTO: 0.5 %
BILIRUB SERPL-MCNC: 0.7 MG/DL (ref 0.2–1.3)
BUN SERPL-MCNC: 14 MG/DL (ref 7–30)
CALCIUM SERPL-MCNC: 9.2 MG/DL (ref 8.5–10.1)
CHLORIDE SERPL-SCNC: 104 MMOL/L (ref 94–109)
CO2 SERPL-SCNC: 27 MMOL/L (ref 20–32)
CREAT SERPL-MCNC: 0.63 MG/DL (ref 0.52–1.04)
DIFFERENTIAL METHOD BLD: ABNORMAL
EOSINOPHIL # BLD AUTO: 0.1 10E9/L (ref 0–0.7)
EOSINOPHIL NFR BLD AUTO: 0.7 %
ERYTHROCYTE [DISTWIDTH] IN BLOOD BY AUTOMATED COUNT: 15.8 % (ref 10–15)
GFR SERPL CREATININE-BSD FRML MDRD: 88 ML/MIN/{1.73_M2}
GLUCOSE SERPL-MCNC: 94 MG/DL (ref 70–99)
HCT VFR BLD AUTO: 42.2 % (ref 35–47)
HGB BLD-MCNC: 13.8 G/DL (ref 11.7–15.7)
LYMPHOCYTES # BLD AUTO: 1.9 10E9/L (ref 0.8–5.3)
LYMPHOCYTES NFR BLD AUTO: 22.3 %
MCH RBC QN AUTO: 29.1 PG (ref 26.5–33)
MCHC RBC AUTO-ENTMCNC: 32.7 G/DL (ref 31.5–36.5)
MCV RBC AUTO: 89 FL (ref 78–100)
MONOCYTES # BLD AUTO: 0.9 10E9/L (ref 0–1.3)
MONOCYTES NFR BLD AUTO: 11 %
NEUTROPHILS # BLD AUTO: 5.5 10E9/L (ref 1.6–8.3)
NEUTROPHILS NFR BLD AUTO: 65.5 %
PLATELET # BLD AUTO: 206 10E9/L (ref 150–450)
POTASSIUM SERPL-SCNC: 3.9 MMOL/L (ref 3.4–5.3)
PROT SERPL-MCNC: 6.7 G/DL (ref 6.8–8.8)
RBC # BLD AUTO: 4.74 10E12/L (ref 3.8–5.2)
SODIUM SERPL-SCNC: 138 MMOL/L (ref 133–144)
TSH SERPL DL<=0.005 MIU/L-ACNC: 1.92 MU/L (ref 0.4–4)
WBC # BLD AUTO: 8.4 10E9/L (ref 4–11)

## 2019-02-19 PROCEDURE — 84443 ASSAY THYROID STIM HORMONE: CPT | Performed by: NURSE PRACTITIONER

## 2019-02-19 PROCEDURE — 99214 OFFICE O/P EST MOD 30 MIN: CPT | Performed by: NURSE PRACTITIONER

## 2019-02-19 PROCEDURE — 36415 COLL VENOUS BLD VENIPUNCTURE: CPT | Performed by: NURSE PRACTITIONER

## 2019-02-19 PROCEDURE — 80053 COMPREHEN METABOLIC PANEL: CPT | Performed by: NURSE PRACTITIONER

## 2019-02-19 PROCEDURE — 85025 COMPLETE CBC W/AUTO DIFF WBC: CPT | Performed by: NURSE PRACTITIONER

## 2019-02-19 RX ORDER — CITALOPRAM HYDROBROMIDE 10 MG/1
10 TABLET ORAL DAILY
Qty: 31 TABLET | Refills: 3 | Status: SHIPPED | OUTPATIENT
Start: 2019-02-19 | End: 2019-06-25

## 2019-02-19 ASSESSMENT — MIFFLIN-ST. JEOR: SCORE: 1469.97

## 2019-02-19 NOTE — PATIENT INSTRUCTIONS
Patient Education     Coping with Hair Loss  What may happen during hair loss?  Radiation and some medicines, like chemotherapy, can cause hair loss. You may start to lose your hair two to three weeks after treatment begins.  Your hair may become brittle and break off at the surface of the scalp, or it may simply fall out from the hair follicles. For many people, the head starts to itch or may be tender to the touch as the hair falls out.  Loss of eyebrows, eyelashes, pubic hair and other body hair may also happen, but this is often less severe. Hair growth is less active in these places than in the scalp.  Some people will lose all their hair. Others have only thinning of the hair. Often it depends on the dose and length of your treatment.  Will my hair grow back?  Hair loss caused by medicine will almost always grow back after treatment ends--and sometimes sooner. It may have a different color or texture than before.  Your hair may not grow back if you receive radiation to the head.  What can I do to cope with hair loss?    For some people, hair loss can cause depression or loss of self-confidence. Talk to your loved ones and care team if you are concerned about your hair loss.    Cut your hair short. A shorter style will make your hair look thicker and zavala. And if hair loss occurs, it will be easier to manage.    Use mild shampoo. You may not need to wash your hair every day.    Use a soft hairbrush.    Avoid the hair dryer, or use only the lowest heat setting.    Don't use brush rollers to set your hair.    Don't dye your hair or get a permanent.    Change your linens and pillowcases often. Some people prefer satin.    Cover your head or use sunscreen (SPF 30) when in sunlight.    Cover your head in the winter to prevent heat loss.  If you choose to wear a wig or hairpiece:    You may want to get fit for one before you lose a lot of hair. This way you can match your hair color or style.    Check with your  "care team to see if there is a \"Look Good, Feel Better\" program in your area. They are a resource for hats, turbans, scarves, hairpieces, wigs and make-up.    Your hairpiece may be tax deductible, and insurance may cover part of the cost. Check your policy and get a prescription from your doctor.  When should I call my care team?  Call your care team if:    Emotional distress gets in the way of normal daily living.    You have a rash or small, open sores on your scalp.    You have dry, flaky skin that does not improve with the use of lotion. (Choose alcohol-free lotion.)  Comments:  __________________________________________  __________________________________________  __________________________________________  __________________________________________  __________________________________________  __________________________________________  __________________________________________       Patient Education   Coping with Edema  What is edema?  Edema is the build-up of fluid in the body, which causes swelling. Swelling most commonly occurs in the feet, ankles, lower legs or hands.  Swelling can occur in the belly or chest may be a sign of a more severe problem.  Certain medicines or conditions can make the swelling worse.  Symptoms include:    Feet and lower legs get larger when you sit or walk.    Hands feel tight when you make a fist.    When you push on the skin, skin stays dented.    Shiny, tight skin.    Fast weight gain.  How is it treated?  Your care team may give you a medicine to reduce the swelling.  They may also suggest that you meet with a dietitian. He or she can help with food choices to reduce the swelling.  What can I do about the swelling?    Place your feet above your heart 3 times a day: Sit with your feet up on a stool with a pillow. Sit on the bed or couch with two pillows under your feet.    Do not stand for long periods of time.    Wear loose-fitting clothes.    Do not cross your " legs.    Reduce the salt in your diet.   These foods are high in salt:  ? Chips, soup  ? Frozen meals, TV dinners  ? Soler, lunch meat, ham  ? Sauces (soy, canned spaghetti sauce)    Walk or do other exercise.    Wear compression stockings.    Drink water as normal.    Weigh yourself every day at the same time to keep track of weight gain.  When should I call my care team?  Call your care team if:    You have a hard time breathing.    You gained 5 pounds or more in 1 week.    Your hands or feet feel cold when you touch them.    You are peeing very little or not at all.    Swelling is moving up your arms or legs.    Your tongue is swelling.    You cannot eat for more than a day  If you have any side effects, call us. We can help you manage these problems.  For more information, see:  www.chemocare.com  www.cancer.org/treatment/treatmentsandsideeffects/physicalsideeffects/dealingwithsymptomsathome  www.cancer.gov/cancertopics/coping/chemotherapy-and-you  Comments:  __________________________________________  __________________________________________  __________________________________________  __________________________________________  __________________________________________  __________________________________________  __________________________________________  For informational purposes only. Not to replace the advice of your health care provider.  Copyright   2014 [facility]. All rights reserved. [employer] 583227 - REV 03/16.       Patient Education     Depression: Tips to Help Yourself    As your healthcare providers help treat your depression, you can also help yourself. Keep in mind that your illness affects you emotionally, physically, mentally, and socially. So full recovery will take time. Take care of your body and your soul, and be patient with yourself as you get better.  Self-care    Educate yourself. Read about treatment and medicine options. If you have the energy, attend local  conferences or support groups. Keep a list of useful websites and helpful books and use them as needed. This illness is not your fault. Don t blame yourself for your depression.    Manage early symptoms. If you notice symptoms returning, experience triggers, or identify other factors that may lead to a depressive episode, get help as soon as possible. Ask trusted friends and family to monitor your behavior and let you know if they see anything of concern.    Work with your provider. Find a provider you can trust. Communicate honestly with that person and share information on your treatment for depression and your reaction to medicines.    Be prepared for a crisis. Know what to do if you experience a crisis. Keep the phone number of a crisis hotline and know the location of your community's urgent care centers and the closest emergency department.    Hold off on big decisions. Depression can cloud your judgment. So wait until you feel better before making major life decisions, such as changing jobs, moving, or getting  or .    Be patient. Recovering from depression is a process. Don t be discouraged if it takes some time to feel better.    Keep it simple. Depression saps your energy and concentration. So you won t be able to do all the things you used to do. Set small goals and do what you can.    Be with others. Don t isolate yourself--you ll only feel worse. Try to be with other people. And take part in fun activities when you can. Go to a movie, ballgame, [employer] service, or social event. Talk openly with people you can trust. And accept help when it s offered.  Take care of your body  People with depression often lose the desire to take care of themselves. That only makes their problems worse. During treatment and afterward, make a point to:    Exercise. It s a great way to take care of your body. And studies have shown that exercise helps fight depression.    Avoid drugs and alcohol. These may  ease the pain in the short term. But they ll only make your problems worse in the long run.    Get relief from stress. Ask your healthcare provider for relaxation exercises and techniques to help relieve stress.    Eat right. A balanced and healthy diet helps keep your body healthy.  Date Last Reviewed: 1/1/2017 2000-2018 The "[employer]. [address] [place] [address], [place] [address]. All rights reserved. This information is not intended as a substitute for professional medical care. Always follow your healthcare professional's instructions.

## 2019-02-19 NOTE — PROGRESS NOTES
"  SUBJECTIVE:   Melania Alicea is a 73 year old female who presents to clinic today for the following health issues:    Chief Complaint   Patient presents with     Hair Loss     for the last month.    For the last month she is having more problems with hearing loss.  Depression ongoing recent exacerbation with her move from her townhouse to the apartment.  That did not go well for her.  She had difficulty sleeping with that.  Increasing depression.  Chest move back to her townhouse.  Varicose veins with lymphedema and intermittent cellulitis to the lower extremities  Depression is slightly improved.  She has moved back to her townhouse.  Concerned about finances.  Concerned about her ability to do her yard work.  Difficulty with the winter weather getting all the way over here in [place].  Difficulty getting to [place] for her pessary maintenance which is overdue.  She is considering seeing a provider in the [facility] where she lives.  Cymbalta discontinued due to more frequent falls    -------------------------------------    Problem list and histories reviewed & adjusted, as indicated.  Additional history: as documented    Labs reviewed in EPIC    Reviewed and updated as needed this visit by clinical staff  Tobacco  Allergies  Meds  Med Hx  Surg Hx  Fam Hx  Soc Hx      Reviewed and updated as needed this visit by Provider         ROS:   ROS: 10 point ROS neg other than the symptoms noted above in the HPI.      OBJECTIVE:                                                    /82   Pulse 73   Temp 98.7  F (37.1  C) (Tympanic)   Ht 1.715 m (5' 7.5\")   Wt 93.4 kg (206 lb)   BMI 31.79 kg/m    Body mass index is 31.79 kg/m .   GENERAL: Pale elderly female who is ambulating today with a cane slowly.  She has alert and oriented well nourished, well hydrated,   HENT: ear canals- normal; TMs- normal; Nose- normal; Mouth- no ulcers, no lesions  NECK: no tenderness, no adenopathy, no asymmetry, no masses, no " stiffness; thyroid- normal to palpation  RESP: lungs clear to auscultation - no rales, no rhonchi, no wheezes  CV: regular rates and rhythm, normal S1 S2, no S3 or S4 and no murmur, no click or rub -  ABDOMEN: soft, no tenderness, no  hepatosplenomegaly, no masses, normal bowel sounds  SKIN thinning of the hair over the posterior occipital area.  No alopecia   Lower extremities show 1+ pitting edema.  Varicose veins and previous history of venous stasis ulceration in various degrees of healing.    Psych: Depressed affect    Diagnostic test results:  Results for orders placed or performed in visit on 02/19/19   TSH with free T4 reflex   Result Value Ref Range    TSH 1.92 0.40 - 4.00 mU/L   Comprehensive metabolic panel   Result Value Ref Range    Sodium 138 133 - 144 mmol/L    Potassium 3.9 3.4 - 5.3 mmol/L    Chloride 104 94 - 109 mmol/L    Carbon Dioxide 27 20 - 32 mmol/L    Anion Gap 7 3 - 14 mmol/L    Glucose 94 70 - 99 mg/dL    Urea Nitrogen 14 7 - 30 mg/dL    Creatinine 0.63 0.52 - 1.04 mg/dL    GFR Estimate 88 >60 mL/min/[1.73_m2]    GFR Estimate If Black >90 >60 mL/min/[1.73_m2]    Calcium 9.2 8.5 - 10.1 mg/dL    Bilirubin Total 0.7 0.2 - 1.3 mg/dL    Albumin 3.7 3.4 - 5.0 g/dL    Protein Total 6.7 (L) 6.8 - 8.8 g/dL    Alkaline Phosphatase 91 40 - 150 U/L    ALT 16 0 - 50 U/L    AST 11 0 - 45 U/L   CBC with platelets and differential   Result Value Ref Range    WBC 8.4 4.0 - 11.0 10e9/L    RBC Count 4.74 3.8 - 5.2 10e12/L    Hemoglobin 13.8 11.7 - 15.7 g/dL    Hematocrit 42.2 35.0 - 47.0 %    MCV 89 78 - 100 fl    MCH 29.1 26.5 - 33.0 pg    MCHC 32.7 31.5 - 36.5 g/dL    RDW 15.8 (H) 10.0 - 15.0 %    Platelet Count 206 150 - 450 10e9/L    % Neutrophils 65.5 %    % Lymphocytes 22.3 %    % Monocytes 11.0 %    % Eosinophils 0.7 %    % Basophils 0.5 %    Absolute Neutrophil 5.5 1.6 - 8.3 10e9/L    Absolute Lymphocytes 1.9 0.8 - 5.3 10e9/L    Absolute Monocytes 0.9 0.0 - 1.3 10e9/L    Absolute Eosinophils 0.1 0.0  - 0.7 10e9/L    Absolute Basophils 0.0 0.0 - 0.2 10e9/L    Diff Method Automated Method         ASSESSMENT/PLAN:                                                    1. Pruritic disorder  Continue Diprolene twice daily as needed  Benadryl as needed at at bedtime  Labs today to look for other cause  - TSH with free T4 reflex  - Comprehensive metabolic panel  - CBC with platelets and differential    2. Fatigue due to depression  Labs today look for other cause.  Start citalopram 10 mg daily if tolerating well after 2 weeks then may increase to 20 mg daily  - Comprehensive metabolic panel  - CBC with platelets and differential  Ambien refilled to use sparingly as needed for insomnia      3. Hair loss  Labs today look for cause.  Most likely medication induced  - Comprehensive metabolic panel  - CBC with platelets and differential    4. Mild major depression (H)  Ongoing.  Psychotherapy strongly recommended.  Begin new medication  - citalopram (CELEXA) 10 MG tablet; Take 1 tablet (10 mg) by mouth daily  Dispense: 31 tablet; Refill: 3  Follow-up 1 month here or with primary care in [facility]    5. Uterovaginal prolapse, complete  Needs GYN consult for pessary maintenance here or in [place]   Continue estradiol 0.5 mg vaginally twice a week    6. Lymphedema  Chronic and ongoing.  Venous stasis ulceration stable.  Consultation with the lymphedema clinic recommended for fitting of Velcro wraps      Follow up with Provider - Call or return to the clinic with any worsening of symptoms or no resolution. Patient/Parent verbalized understanding and is in agreement. Medication side effects reviewed.   Current Outpatient Medications   Medication Sig Dispense Refill     ADVIL 200 MG PO CAPS ONE TO TWO TABLETS EVERY 6-8 HOURS AS NEEDED  0     citalopram (CELEXA) 10 MG tablet Take 1 tablet (10 mg) by mouth daily 31 tablet 3     estradiol (ESTRACE) 0.5 MG tablet Place vaginally twice a week 24 tablet 3     VITAMIN D, CHOLECALCIFEROL,  PO Take 1,000 Units by mouth daily       zolpidem (AMBIEN) 5 MG tablet Take 1 tablet (5 mg) by mouth nightly as needed for sleep 30 tablet 5     order for DME Equipment being ordered: JAQUAN walker shoe: do not remove plugs 1 Units 0        See Patient Instructions  Chart documentation with Dragon Voice recognition Software. Although reviewed after completion, some words and grammatical errors may remain.    JOANN Dorsey [facility]

## 2019-02-25 ENCOUNTER — OFFICE VISIT (OUTPATIENT)
Dept: OBGYN | Facility: CLINIC | Age: 74
End: 2019-02-25
Payer: COMMERCIAL

## 2019-02-25 VITALS
HEART RATE: 77 BPM | BODY MASS INDEX: 31.07 KG/M2 | HEIGHT: 68 IN | SYSTOLIC BLOOD PRESSURE: 124 MMHG | TEMPERATURE: 97.8 F | RESPIRATION RATE: 18 BRPM | DIASTOLIC BLOOD PRESSURE: 79 MMHG | WEIGHT: 205 LBS

## 2019-02-25 DIAGNOSIS — Z46.89 PESSARY MAINTENANCE: ICD-10-CM

## 2019-02-25 PROCEDURE — 99213 OFFICE O/P EST LOW 20 MIN: CPT | Performed by: OBSTETRICS & GYNECOLOGY

## 2019-02-25 RX ORDER — ESTRADIOL 0.5 MG/1
TABLET ORAL
Qty: 24 TABLET | Refills: 3 | Status: SHIPPED | OUTPATIENT
Start: 2019-02-25 | End: 2019-06-24

## 2019-02-25 ASSESSMENT — MIFFLIN-ST. JEOR: SCORE: 1465.43

## 2019-02-25 NOTE — PROGRESS NOTES
Melania is a 73 year old   female who presents for pessary recheck;  she currently has a Gelhorn pessary which she keeps in place continuously;  she reports no problems with her pessary, no discharge, no bleeding.. She is using vaginal estrogen twice a week.    All systems were reviewed and pertinent information in noted in subjective/HPI.    Past Medical History:   Diagnosis Date     Arthritis      Charcot Emilee Tooth muscular atrophy 2010    Symptoms began at age 48. Difficulty with stairs, getting up from chairs, weakness in LE's but more so in the left.       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 Cunningham, [place], Right     COLONOSCOPY  2010       Current Outpatient Medications   Medication Sig Dispense Refill     ADVIL 200 MG PO CAPS ONE TO TWO TABLETS EVERY 6-8 HOURS AS NEEDED  0     citalopram (CELEXA) 10 MG tablet Take 1 tablet (10 mg) by mouth daily 31 tablet 3     estradiol (ESTRACE) 0.5 MG tablet Place vaginally twice a week 24 tablet 3     order for DME Equipment being ordered:  walker shoe: do not remove plugs 1 Units 0     VITAMIN D, CHOLECALCIFEROL, PO Take 1,000 Units by mouth daily       zolpidem (AMBIEN) 5 MG tablet Take 1 tablet (5 mg) by mouth nightly as needed for sleep 30 tablet 5       Social History     Socioeconomic History     Marital status: Single     Spouse name: None     Number of children: None     Years of education: None     Highest education  "level: None   Occupational History     None   Social Needs     Financial resource strain: None     Food insecurity:     Worry: None     Inability: None     Transportation needs:     Medical: None     Non-medical: None   Tobacco Use     Smoking status: Never Smoker     Smokeless tobacco: Never Used   Substance and Sexual Activity     Alcohol use: No     Drug use: No     Sexual activity: No   Lifestyle     Physical activity:     Days per week: None     Minutes per session: None     Stress: None   Relationships     Social connections:     Talks on phone: None     Gets together: None     Attends Quaker service: None     Active member of club or organization: None     Attends meetings of clubs or organizations: None     Relationship status: None     Intimate partner violence:     Fear of current or ex partner: None     Emotionally abused: None     Physically abused: None     Forced sexual activity: None   Other Topics Concern     Parent/sibling w/ CABG, MI or angioplasty before 65F 55M? No   Social History Narrative     None       Family History   Problem Relation Age of Onset     Hypertension Mother      Musculoskeletal Disorder Mother         CMT- Charcot Emilee Tooth     Cerebrovascular Disease Father      Respiratory Brother         Emphysema     Musculoskeletal Disorder Daughter         CMT- Charcot Emilee Tooth     Other Cancer Brother         Bone and Bladder       OBJECTIVE: /79 (BP Location: Right arm, Patient Position: Chair, Cuff Size: Adult Large)   Pulse 77   Temp 97.8  F (36.6  C) (Tympanic)   Resp 18   Ht 1.715 m (5' 7.5\")   Wt 93 kg (205 lb)   BMI 31.63 kg/m    No LMP recorded. Patient is postmenopausal.  The pessary was removed without difficulty, cleaned in warm water and then replaced, after inspection of the vulva and vagina, which showed no erosion.    ASSESSMENT:     ICD-10-CM    1. Pessary maintenance Z46.89 estradiol (ESTRACE) 0.5 MG tablet         PLAN: refill vaginal estradiol; " pessary >2 years old; likely change to new one next visit in 3 months    Lisa Quarles MD

## 2019-03-25 ENCOUNTER — MYC MEDICAL ADVICE (OUTPATIENT)
Dept: FAMILY MEDICINE | Facility: CLINIC | Age: 74
End: 2019-03-25

## 2019-04-15 NOTE — PROGRESS NOTES
SUBJECTIVE:                                                    Melania Alicea is a 71 year old female who presents to clinic today for the following health issues:      Cellulitis  Recheck leg  Done with Keflex for the cellulitis was resolving with the Keflex  Using Medihoney to the open lesions and this has resolved the open lesions per patient's report and Triderma Psoriasis Lotion  Still having significantly more swelling in leg varicose veins  Home Care Nurse has come out Sat Monday Thursday Monday and again Tomorrow and again 2 more times  She has not been using the Ace wrap consistently due to discomfort    -------------------------------------    Problem list and histories reviewed & adjusted, as indicated.  Additional history: as documented    BP Readings from Last 3 Encounters:   03/29/17 122/80   03/15/17 146/76   01/09/17 (!) 147/94    Wt Readings from Last 3 Encounters:   03/29/17 274 lb (124.3 kg)   03/15/17 273 lb (123.8 kg)   01/09/17 270 lb (122.5 kg)                  Labs reviewed in EPIC    Reviewed and updated as needed this visit by clinical staff  Tobacco  Allergies       Reviewed and updated as needed this visit by Provider         ROS:  C: NEGATIVE for fever, chills, change in weight  INTEGUMENTARY/SKIN: Cellulitis. Staph infection.  E/M: NEGATIVE for ear, mouth and throat problems  R: NEGATIVE for significant cough or SOB  CV: NEGATIVE for chest pain, palpitations or peripheral edema  PSYCHIATRIC: NEGATIVE for changes in mood or affect  ROS otherwise negative    OBJECTIVE:                                                    /80  Pulse 75  Temp 99.1  F (37.3  C) (Tympanic)  Wt 274 lb (124.3 kg)  SpO2 96%  BMI 42.28 kg/m2  Body mass index is 42.28 kg/(m^2).   GENERAL: healthy, alert, well nourished, well hydrated, no distress  HENT: ear canals- normal; TMs- normal; Nose- normal; Mouth- no ulcers, no lesions  NECK: no tenderness, no adenopathy, no asymmetry, no masses, no stiffness;  Pacemaker Battery Change thyroid- normal to palpation  RESP: lungs clear to auscultation - no rales, no rhonchi, no wheezes  CV: regular rates and rhythm, normal S1 S2, no S3 or S4 and no murmur, no click or rub -  ABDOMEN: soft, no tenderness, no  hepatosplenomegaly, no masses, normal bowel sounds  MUSC redness and erythema to the right lower extremity. Previously open lesions in the right calf have resolved  Oozing lesions resolving to the right anterior shin tender to palpation  Diagnostic test results:  Results for orders placed or performed in visit on 03/15/17   Wound Culture Aerobic Bacterial   Result Value Ref Range    Specimen Description Right Leg LOWER     Culture Micro (A)      Heavy growth Beta hemolytic Streptococcus group C  Heavy growth Coagulase negative Staphylococcus Susceptibility testing not   routinely done      Micro Report Status FINAL 03/18/2017     Organism: Heavy growth Beta hemolytic Streptococcus group C        Susceptibility    Heavy growth beta hemolytic streptococcus group c (west gram pos panel) -  (no method available)     AMPICILLIN <=0.06 Susceptible  ug/mL     PENICILLIN <=0.03 Susceptible  ug/mL     VANCOMYCIN 0.25 Susceptible  ug/mL     CEFOTAXIME <=0.25 Susceptible  ug/mL     CEFTRIAXONE <=0.25 Susceptible  ug/mL     ERYTHROMYCIN >0.5 Resistant  ug/mL     CLINDAMYCIN >0.5 Resistant  ug/mL          ASSESSMENT/PLAN:                                                    1. Cellulitis of right lower extremity  3. Infection due to Staphylococcus aureus      Staph infection  - US Lower Extremity Venous Duplex Right; Future  Continue oral antibiotics  - cephALEXin (KEFLEX) 500 MG capsule; Take 1 capsule (500 mg) by mouth 3 times daily  Dispense: 30 capsule; Refill: 0  For pain may use  - acetaminophen-codeine (TYLENOL #3) 300-30 MG per tablet; Take 1 tablet by mouth every 6 hours as needed for pain maximum 3 tablet(s) per day  Dispense: 18 tablet; Refill: 0    2. Venous stasis dermatitis of right lower  extremity  Unna boot is placed today. We will see how she tolerates this. Due to her skin sensitivities and Charcot Emilee tooth muscular atrophy she has difficulty with ambulation as is we'll need to monitor closely  Continue home care for help with wound care and venous stasis care  - cephALEXin (KEFLEX) 500 MG capsule; Take 1 capsule (500 mg) by mouth 3 times daily  Dispense: 30 capsule; Refill: 0  - triamcinolone (KENALOG) 0.5 % OINT ointment; Thin layer to affected area BID prn itching redness  Dispense: 15 g; Refill: 2      Follow up with Provider - Call or return to the clinic with any worsening of symptoms or no resolution. Patient/Parent verbalized understanding and is in agreement. Medication side effects reviewed.   Current Outpatient Prescriptions   Medication Sig Dispense Refill     Wound Dressings ([facility] WOUND/BURN DRESSING EX)        Calcitriol-Fluticas-Tarcrolim (TRIDERMA FORTE) CREA        cephALEXin (KEFLEX) 500 MG capsule Take 1 capsule (500 mg) by mouth 3 times daily 30 capsule 0     acetaminophen-codeine (TYLENOL #3) 300-30 MG per tablet Take 1 tablet by mouth every 6 hours as needed for pain maximum 3 tablet(s) per day 18 tablet 0     triamcinolone (KENALOG) 0.5 % OINT ointment Thin layer to affected area BID prn itching redness 15 g 2     estradiol (ESTRACE) 0.5 MG tablet Place vaginally twice a week 24 tablet 3     VITAMIN D, CHOLECALCIFEROL, PO Take 1,000 Units by mouth daily       ADVIL 200 MG PO CAPS ONE TO TWO TABLETS EVERY 6-8 HOURS AS NEEDED  0        See Patient Instructions    JOANN Dorsey [facility]

## 2019-05-22 ENCOUNTER — MYC REFILL (OUTPATIENT)
Dept: FAMILY MEDICINE | Facility: CLINIC | Age: 74
End: 2019-05-22

## 2019-05-22 DIAGNOSIS — G47.00 PERSISTENT INSOMNIA: ICD-10-CM

## 2019-05-22 RX ORDER — ZOLPIDEM TARTRATE 5 MG/1
5 TABLET ORAL
Qty: 30 TABLET | Refills: 0 | Status: SHIPPED | OUTPATIENT
Start: 2019-05-22 | End: 2019-06-25

## 2019-06-22 ASSESSMENT — ENCOUNTER SYMPTOMS
COUGH: 0
DYSURIA: 0
JOINT SWELLING: 1
NERVOUS/ANXIOUS: 0
WEAKNESS: 0
EYE PAIN: 0
HEMATURIA: 0
HEMATOCHEZIA: 0
SHORTNESS OF BREATH: 0
DIZZINESS: 0
FREQUENCY: 0
DIARRHEA: 0
CONSTIPATION: 0
PALPITATIONS: 0
HEADACHES: 1
SORE THROAT: 0
NAUSEA: 0
FEVER: 0
ABDOMINAL PAIN: 1
MYALGIAS: 1
HEARTBURN: 0
CHILLS: 1
BREAST MASS: 0
ARTHRALGIAS: 1
PARESTHESIAS: 1

## 2019-06-22 ASSESSMENT — PATIENT HEALTH QUESTIONNAIRE - PHQ9
SUM OF ALL RESPONSES TO PHQ QUESTIONS 1-9: 3
SUM OF ALL RESPONSES TO PHQ QUESTIONS 1-9: 3
10. IF YOU CHECKED OFF ANY PROBLEMS, HOW DIFFICULT HAVE THESE PROBLEMS MADE IT FOR YOU TO DO YOUR WORK, TAKE CARE OF THINGS AT HOME, OR GET ALONG WITH OTHER PEOPLE: NOT DIFFICULT AT ALL

## 2019-06-22 ASSESSMENT — ACTIVITIES OF DAILY LIVING (ADL): CURRENT_FUNCTION: NO ASSISTANCE NEEDED

## 2019-06-24 ENCOUNTER — OFFICE VISIT (OUTPATIENT)
Dept: OBGYN | Facility: CLINIC | Age: 74
End: 2019-06-24
Payer: COMMERCIAL

## 2019-06-24 VITALS
WEIGHT: 192 LBS | HEIGHT: 68 IN | BODY MASS INDEX: 29.1 KG/M2 | TEMPERATURE: 97.7 F | RESPIRATION RATE: 18 BRPM | DIASTOLIC BLOOD PRESSURE: 69 MMHG | HEART RATE: 65 BPM | SYSTOLIC BLOOD PRESSURE: 120 MMHG

## 2019-06-24 DIAGNOSIS — Z46.89 PESSARY MAINTENANCE: ICD-10-CM

## 2019-06-24 PROCEDURE — 99213 OFFICE O/P EST LOW 20 MIN: CPT | Performed by: OBSTETRICS & GYNECOLOGY

## 2019-06-24 RX ORDER — ESTRADIOL 0.5 MG/1
TABLET ORAL
Qty: 24 TABLET | Refills: 3 | Status: SHIPPED | OUTPATIENT
Start: 2019-06-24 | End: 2019-08-06

## 2019-06-24 ASSESSMENT — MIFFLIN-ST. JEOR: SCORE: 1406.47

## 2019-06-24 NOTE — PROGRESS NOTES
Melania is a 73 year old   female who presents for pessary recheck;  she currently has a Gelhorn pessary which she keeps in place continuously;  she reports no problems with her pessary, no discharge, no bleeding.. She is using vaginal estrogen twice a week.    All systems were reviewed and pertinent information in noted in subjective/HPI.    Past Medical History:   Diagnosis Date     Arthritis      Charcot Emilee Tooth muscular atrophy 2010    Symptoms began at age 48. Difficulty with stairs, getting up from chairs, weakness in LE's but more so in the left.       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 Cunningham, [place], Right     COLONOSCOPY  2010       Current Outpatient Medications   Medication Sig Dispense Refill     ADVIL 200 MG PO CAPS ONE TO TWO TABLETS EVERY 6-8 HOURS AS NEEDED  0     estradiol (ESTRACE) 0.5 MG tablet Place vaginally twice a week 24 tablet 3     order for DME Equipment being ordered: DH walker shoe: do not remove plugs 1 Units 0     VITAMIN D, CHOLECALCIFEROL, PO Take 1,000 Units by mouth daily       zolpidem (AMBIEN) 5 MG tablet Take 1 tablet (5 mg) by mouth nightly as needed for sleep 30 tablet 0     citalopram (CELEXA) 10 MG tablet Take 1 tablet (10 mg) by mouth daily (Patient not taking: Reported on 2019) 31 tablet 3       Social History     Socioeconomic History     Marital status: Single     Spouse name: None     Number of children: None     Years  "of education: None     Highest education level: None   Occupational History     None   Social Needs     Financial resource strain: None     Food insecurity:     Worry: None     Inability: None     Transportation needs:     Medical: None     Non-medical: None   Tobacco Use     Smoking status: Never Smoker     Smokeless tobacco: Never Used   Substance and Sexual Activity     Alcohol use: No     Drug use: No     Sexual activity: Never   Lifestyle     Physical activity:     Days per week: None     Minutes per session: None     Stress: None   Relationships     Social connections:     Talks on phone: None     Gets together: None     Attends Zoroastrian service: None     Active member of club or organization: None     Attends meetings of clubs or organizations: None     Relationship status: None     Intimate partner violence:     Fear of current or ex partner: None     Emotionally abused: None     Physically abused: None     Forced sexual activity: None   Other Topics Concern     Parent/sibling w/ CABG, MI or angioplasty before 65F 55M? No   Social History Narrative     None       Family History   Problem Relation Age of Onset     Hypertension Mother      Musculoskeletal Disorder Mother         CMT- Charcot Emilee Tooth     Cerebrovascular Disease Father      Respiratory Brother         Emphysema     Musculoskeletal Disorder Daughter         CMT- Charcot Emilee Tooth     Other Cancer Brother         Bone and Bladder       OBJECTIVE: /69 (BP Location: Right arm, Patient Position: Chair, Cuff Size: Adult Large)   Pulse 65   Temp 97.7  F (36.5  C) (Tympanic)   Resp 18   Ht 1.715 m (5' 7.5\")   Wt 87.1 kg (192 lb)   BMI 29.63 kg/m    No LMP recorded. Patient is postmenopausal.  The pessary was removed without difficulty, cleaned in warm water and then replaced, after inspection of the vulva and vagina, which showed no erosions.    ASSESSMENT:     ICD-10-CM    1. Pessary maintenance Z46.89 estradiol (ESTRACE) 0.5 MG " tablet         PLAN: continue twice a week vaginal estradiol  F/u 4 months; likely will need pessary replaced at that time    Lisa Quarles MD

## 2019-06-25 ENCOUNTER — OFFICE VISIT (OUTPATIENT)
Dept: FAMILY MEDICINE | Facility: CLINIC | Age: 74
End: 2019-06-25
Payer: COMMERCIAL

## 2019-06-25 VITALS
HEIGHT: 68 IN | TEMPERATURE: 97.3 F | SYSTOLIC BLOOD PRESSURE: 102 MMHG | RESPIRATION RATE: 14 BRPM | DIASTOLIC BLOOD PRESSURE: 60 MMHG | BODY MASS INDEX: 29.1 KG/M2 | HEART RATE: 68 BPM | OXYGEN SATURATION: 99 % | WEIGHT: 192 LBS

## 2019-06-25 DIAGNOSIS — Z00.00 ENCOUNTER FOR MEDICARE ANNUAL WELLNESS EXAM: Primary | ICD-10-CM

## 2019-06-25 DIAGNOSIS — G47.00 PERSISTENT INSOMNIA: ICD-10-CM

## 2019-06-25 DIAGNOSIS — Z79.899 OTHER LONG TERM (CURRENT) DRUG THERAPY: ICD-10-CM

## 2019-06-25 DIAGNOSIS — G62.9 PERIPHERAL POLYNEUROPATHY: ICD-10-CM

## 2019-06-25 LAB — VIT B12 SERPL-MCNC: 207 PG/ML (ref 193–986)

## 2019-06-25 PROCEDURE — 99213 OFFICE O/P EST LOW 20 MIN: CPT | Mod: 25 | Performed by: NURSE PRACTITIONER

## 2019-06-25 PROCEDURE — 99000 SPECIMEN HANDLING OFFICE-LAB: CPT | Performed by: NURSE PRACTITIONER

## 2019-06-25 PROCEDURE — 99397 PER PM REEVAL EST PAT 65+ YR: CPT | Performed by: NURSE PRACTITIONER

## 2019-06-25 PROCEDURE — 82306 VITAMIN D 25 HYDROXY: CPT | Performed by: NURSE PRACTITIONER

## 2019-06-25 PROCEDURE — 36415 COLL VENOUS BLD VENIPUNCTURE: CPT | Performed by: NURSE PRACTITIONER

## 2019-06-25 PROCEDURE — 82607 VITAMIN B-12: CPT | Performed by: NURSE PRACTITIONER

## 2019-06-25 PROCEDURE — 84207 ASSAY OF VITAMIN B-6: CPT | Mod: 90 | Performed by: NURSE PRACTITIONER

## 2019-06-25 RX ORDER — GABAPENTIN 300 MG/1
300 CAPSULE ORAL 3 TIMES DAILY
Qty: 90 CAPSULE | Refills: 2 | Status: SHIPPED | OUTPATIENT
Start: 2019-06-25 | End: 2019-12-20

## 2019-06-25 RX ORDER — ZOLPIDEM TARTRATE 5 MG/1
5 TABLET ORAL
Qty: 30 TABLET | Refills: 5 | Status: SHIPPED | OUTPATIENT
Start: 2019-06-25 | End: 2020-01-30

## 2019-06-25 ASSESSMENT — ENCOUNTER SYMPTOMS
FEVER: 0
COUGH: 0
HEMATOCHEZIA: 0
SORE THROAT: 0
FREQUENCY: 0
JOINT SWELLING: 1
SHORTNESS OF BREATH: 0
NERVOUS/ANXIOUS: 0
MYALGIAS: 1
ARTHRALGIAS: 1
NAUSEA: 0
CHILLS: 1
CONSTIPATION: 0
HEMATURIA: 0
DYSURIA: 0
DIZZINESS: 0
PARESTHESIAS: 1
HEARTBURN: 0
BREAST MASS: 0
EYE PAIN: 0
DIARRHEA: 0
ABDOMINAL PAIN: 1
WEAKNESS: 0
HEADACHES: 1
PALPITATIONS: 0

## 2019-06-25 ASSESSMENT — MIFFLIN-ST. JEOR: SCORE: 1406.47

## 2019-06-25 ASSESSMENT — PATIENT HEALTH QUESTIONNAIRE - PHQ9: SUM OF ALL RESPONSES TO PHQ QUESTIONS 1-9: 3

## 2019-06-25 ASSESSMENT — PAIN SCALES - GENERAL: PAINLEVEL: NO PAIN (0)

## 2019-06-25 ASSESSMENT — ACTIVITIES OF DAILY LIVING (ADL): CURRENT_FUNCTION: NO ASSISTANCE NEEDED

## 2019-06-25 NOTE — PROGRESS NOTES
"SUBJECTIVE:   Melania Alicea is a 73 year old female who presents for Preventive Visit.  Are you in the first 12 months of your Medicare coverage?  No    Healthy Habits:     In general, how would you rate your overall health?  Fair    Frequency of exercise:  None    Do you usually eat at least 4 servings of fruit and vegetables a day, include whole grains    & fiber and avoid regularly eating high fat or \"junk\" foods?  No    Taking medications regularly:  Yes    Medication side effects:  Muscle aches    Ability to successfully perform activities of daily living:  No assistance needed    Home Safety:  No safety concerns identified    Hearing Impairment:  No hearing concerns    In the past 6 months, have you been bothered by leaking of urine?  No    In general, how would you rate your overall mental or emotional health?  Fair      PHQ-2 Total Score: 0    Additional concerns today:  Yes    Hair loss since jan 2019- normal labs   Increased neuropathy  Pain from hips into feet started about 3 months   Persistent insomnia with financial stressors  Varicose veins and venous stasis to the lower extremities bilaterally  Weight loss    Do you feel safe in your environment? Yes    Do you have a Health Care Directive? Yes: Advance Directive has been received and scanned.      Fall risk  Fallen 2 or more times in the past year?: No  Any fall with injury in the past year?: No  click delete button to remove this line now  Cognitive Screening   1) Repeat 3 items (Leader, Season, Table)    2) Clock draw: NORMAL  3) 3 item recall: Recalls 3 objects  Results: NORMAL clock, 1-2 items recalled: COGNITIVE IMPAIRMENT LESS LIKELY    Mini-CogTM Copyright FELIX Mccann. Licensed by the author for use in [facility]; reprinted with permission (johnnie@.[facility]). All rights reserved.      Do you have sleep apnea, excessive snoring or daytime drowsiness?: no    Reviewed and updated as needed this visit by clinical staff  Tobacco  Allergies  " Meds  Med Hx  Surg Hx  Fam Hx  Soc Hx        Reviewed and updated as needed this visit by Provider        Social History     Tobacco Use     Smoking status: Never Smoker     Smokeless tobacco: Never Used   Substance Use Topics     Alcohol use: No     If you drink alcohol do you typically have >3 drinks per day or >7 drinks per week? No    Alcohol Use 6/22/2019   Prescreen: >3 drinks/day or >7 drinks/week? Not Applicable   Prescreen: >3 drinks/day or >7 drinks/week? -   No flowsheet data found.    Current providers sharing in care for this patient include:   Patient Care Team:  Betty Arenas APRN CNP as PCP - General (Nurse Practitioner)  Betty Arenas APRN CNP as Assigned PCP    The following health maintenance items are reviewed in Epic and correct as of today:  Health Maintenance   Topic Date Due     DEXA  1945     ZOSTER IMMUNIZATION (2 of 3) 08/17/2010     MEDICARE ANNUAL WELLNESS VISIT  07/30/2014     FALL RISK ASSESSMENT  09/14/2017     ADVANCE CARE PLANNING  08/26/2018     MAMMO SCREENING  11/10/2018     LIPID  04/28/2020     DTAP/TDAP/TD IMMUNIZATION (2 - Td) 06/22/2020     COLONOSCOPY  07/07/2020     INFLUENZA VACCINE  Addressed     IPV IMMUNIZATION  Aged Out     MENINGITIS IMMUNIZATION  Aged Out     BP Readings from Last 3 Encounters:   06/24/19 120/69   02/25/19 124/79   02/19/19 111/82    Wt Readings from Last 3 Encounters:   06/25/19 87.1 kg (192 lb)   06/24/19 87.1 kg (192 lb)   02/25/19 93 kg (205 lb)            Wt Readings from Last 5 Encounters:   06/25/19 87.1 kg (192 lb)   06/24/19 87.1 kg (192 lb)   02/25/19 93 kg (205 lb)   02/19/19 93.4 kg (206 lb)   10/31/18 107.6 kg (237 lb 3.2 oz)     Some accounting work yet.             Review of Systems   Constitutional: Positive for chills. Negative for fever.   HENT: Negative for congestion, ear pain, hearing loss and sore throat.    Eyes: Negative for pain and visual disturbance.   Respiratory: Negative for cough and  "shortness of breath.    Cardiovascular: Positive for peripheral edema. Negative for chest pain and palpitations.   Gastrointestinal: Positive for abdominal pain. Negative for constipation, diarrhea, heartburn, hematochezia and nausea.   Breasts:  Negative for tenderness, breast mass and discharge.   Genitourinary: Negative for dysuria, frequency, genital sores, hematuria, pelvic pain, urgency, vaginal bleeding and vaginal discharge.   Musculoskeletal: Positive for arthralgias, joint swelling and myalgias.   Skin: Negative for rash.   Neurological: Positive for headaches and paresthesias. Negative for dizziness and weakness.   Psychiatric/Behavioral: Negative for mood changes. The patient is not nervous/anxious.          OBJECTIVE:   Temp 97.3  F (36.3  C) (Tympanic)   Resp 14   Ht 1.715 m (5' 7.5\")   Wt 87.1 kg (192 lb)   SpO2 99%   BMI 29.63 kg/m   Estimated body mass index is 29.63 kg/m  as calculated from the following:    Height as of this encounter: 1.715 m (5' 7.5\").    Weight as of this encounter: 87.1 kg (192 lb).  Physical Exam  GENERAL: alert and no distress  EYES: Eyes grossly normal to inspection, PERRL and conjunctivae and sclerae normal  HENT: ear canals and TM's normal, nose and mouth without ulcers or lesions  NECK: no adenopathy, no asymmetry, masses, or scars and thyroid normal to palpation  RESP: lungs clear to auscultation - no rales, rhonchi or wheezes  CV: regular rate and rhythm, normal S1 S2, no S3 or S4, no murmur, click or rub, no peripheral edema and peripheral pulses strong  ABDOMEN: soft, nontender, no hepatosplenomegaly, umbilical hernia. bowel sounds normal  MS: no gross musculoskeletal defects noted, no edema  SKIN: Stasis changes with superficial ulceration to the shins bilaterally  NEURO: Normal strength and tone, mentation intact and speech normal  PSYCH: mentation appears normal, affect flat and judgement and insight intact    Diagnostic Test Results:  Results for orders " "placed or performed in visit on 06/25/19   Vitamin B6   Result Value Ref Range    Vitamin B6 48.1 20.0 - 125.0 nmol/L   Vitamin B12   Result Value Ref Range    Vitamin B12 207 193 - 986 pg/mL   Vitamin D Deficiency   Result Value Ref Range    Vitamin D Deficiency screening 26 20 - 75 ug/L       ASSESSMENT / PLAN:   Melania was seen today for medicare visit and hair loss.    Diagnoses and all orders for this visit:    Encounter for Medicare annual wellness exam    Persistent insomnia  -     zolpidem (AMBIEN) 5 MG tablet; Take 1 tablet (5 mg) by mouth nightly as needed for sleep    Peripheral polyneuropathy  -     Vitamin B6  -     Vitamin B12  -     Vitamin D Deficiency  -     gabapentin (NEURONTIN) 300 MG capsule; Take 1 capsule (300 mg) by mouth 3 times daily    Other long term (current) drug therapy   -     Vitamin D Deficiency    May increase gabapentin to 300 mg 3 times daily for neuropathic pain  Watch for problems with sedation  Labs done today we will contact you with results  Tylenol as needed up to 3500 mg daily  Ambien only as needed   psychiatry consultation recommended  CT abdomen pelvis with ongoing abdominal pain patient will consider and let us know she would like to pursue this.    End of Life Planning:  Patient currently has an advanced directive: DNR/DNI.  Practitioner is supportive of decision.    COUNSELING:  Reviewed preventive health counseling, as reflected in patient instructions    Estimated body mass index is 29.63 kg/m  as calculated from the following:    Height as of this encounter: 1.715 m (5' 7.5\").    Weight as of this encounter: 87.1 kg (192 lb).    Weight management plan: Discussed healthy diet and exercise guidelines     reports that she has never smoked. She has never used smokeless tobacco.      Appropriate preventive services were discussed with this patient, including applicable screening as appropriate for cardiovascular disease, diabetes, osteopenia/osteoporosis, and glaucoma.  " As appropriate for age/gender, discussed screening for colorectal cancer, prostate cancer, breast cancer, and cervical cancer. Checklist reviewing preventive services available has been given to the patient.    Reviewed patients plan of care and provided an AVS. The Intermediate Care Plan ( asthma action plan, low back pain action plan, and migraine action plan) for Melania meets the Care Plan requirement. This Care Plan has been established and reviewed with the Patient.    Counseling Resources:  ATP IV Guidelines  Pooled Cohorts Equation Calculator  Breast Cancer Risk Calculator  FRAX Risk Assessment  ICSI Preventive Guidelines  Dietary Guidelines for Americans, 2010  USDA's MyPlate  ASA Prophylaxis  Lung CA Screening    Call or return to the clinic with any worsening of symptoms or no resolution. Patient/Parent verbalized understanding and is in agreement. Medication side effects reviewed.   Current Outpatient Medications   Medication Sig Dispense Refill     ADVIL 200 MG PO CAPS ONE TO TWO TABLETS EVERY 6-8 HOURS AS NEEDED  0     estradiol (ESTRACE) 0.5 MG tablet Place vaginally twice a week 24 tablet 3     gabapentin (NEURONTIN) 300 MG capsule Take 1 capsule (300 mg) by mouth 3 times daily 90 capsule 2     VITAMIN D, CHOLECALCIFEROL, PO Take 1,000 Units by mouth daily       zolpidem (AMBIEN) 5 MG tablet Take 1 tablet (5 mg) by mouth nightly as needed for sleep 30 tablet 5     Chart documentation with Dragon Voice recognition Software. Although reviewed after completion, some words and grammatical errors may remain.    JOANN Dorsey [facility]    Identified Health Risks:   Weight loss depression chronic pain balance/mobility issues

## 2019-06-25 NOTE — PATIENT INSTRUCTIONS
Patient Education   Personalized Prevention Plan  You are due for the preventive services outlined below.  Your care team is available to assist you in scheduling these services.  If you have already completed any of these items, please share that information with your care team to update in your medical record.  Health Maintenance Due   Topic Date Due     Osteoporosis Screening  1945     Zoster (Shingles) Vaccine (2 of 3) 08/17/2010     Annual Wellness Visit  07/30/2014     FALL RISK ASSESSMENT  09/14/2017     Discuss Advance Directive Planning  08/26/2018     Mammogram  11/10/2018

## 2019-06-26 LAB — DEPRECATED CALCIDIOL+CALCIFEROL SERPL-MC: 26 UG/L (ref 20–75)

## 2019-06-28 LAB — VIT B6 SERPL-MCNC: 48.1 NMOL/L (ref 20–125)

## 2019-08-06 ENCOUNTER — MYC REFILL (OUTPATIENT)
Dept: OBGYN | Facility: CLINIC | Age: 74
End: 2019-08-06

## 2019-08-06 DIAGNOSIS — Z46.89 PESSARY MAINTENANCE: ICD-10-CM

## 2019-08-07 RX ORDER — ESTRADIOL 0.5 MG/1
TABLET ORAL
Qty: 24 TABLET | Refills: 3 | Status: SHIPPED | OUTPATIENT
Start: 2019-08-07 | End: 2020-06-19

## 2019-08-07 NOTE — TELEPHONE ENCOUNTER
6/24/19 last office visit        PLAN: continue twice a week vaginal estradiol  F/u 4 months; likely will need pessary replaced at that time     Prescription approved.    Yael Lira   [facility]  RN

## 2019-10-02 ENCOUNTER — HEALTH MAINTENANCE LETTER (OUTPATIENT)
Age: 74
End: 2019-10-02

## 2019-10-14 ENCOUNTER — OFFICE VISIT (OUTPATIENT)
Dept: OBGYN | Facility: CLINIC | Age: 74
End: 2019-10-14
Payer: COMMERCIAL

## 2019-10-14 VITALS
TEMPERATURE: 98.2 F | BODY MASS INDEX: 28.19 KG/M2 | WEIGHT: 186 LBS | RESPIRATION RATE: 18 BRPM | DIASTOLIC BLOOD PRESSURE: 74 MMHG | SYSTOLIC BLOOD PRESSURE: 125 MMHG | HEART RATE: 70 BPM | HEIGHT: 68 IN

## 2019-10-14 DIAGNOSIS — Z46.89 PESSARY MAINTENANCE: Primary | ICD-10-CM

## 2019-10-14 PROCEDURE — 99213 OFFICE O/P EST LOW 20 MIN: CPT | Performed by: OBSTETRICS & GYNECOLOGY

## 2019-10-14 ASSESSMENT — MIFFLIN-ST. JEOR: SCORE: 1374.25

## 2019-10-14 NOTE — PROGRESS NOTES
Melania is a 74 year old   female who presents for pessary recheck;  she currently has a   gelhorn style pessary which she keeps in place continuously;  she reports no problems with her pessary, no discharge, no bleeding.. She is using vaginal estrogen twice a week.    All systems were reviewed and pertinent information in noted in subjective/HPI.    Past Medical History:   Diagnosis Date     Arthritis      Charcot Emilee Tooth muscular atrophy 2010    Symptoms began at age 48. Difficulty with stairs, getting up from chairs, weakness in LE's but more so in the left.       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 Cunningham, [place], Right     COLONOSCOPY  2010       Current Outpatient Medications   Medication Sig Dispense Refill     ADVIL 200 MG PO CAPS ONE TO TWO TABLETS EVERY 6-8 HOURS AS NEEDED  0     estradiol (ESTRACE) 0.5 MG tablet Place vaginally twice a week 24 tablet 3     VITAMIN D, CHOLECALCIFEROL, PO Take 1,000 Units by mouth daily       zolpidem (AMBIEN) 5 MG tablet Take 1 tablet (5 mg) by mouth nightly as needed for sleep 30 tablet 5     gabapentin (NEURONTIN) 300 MG capsule Take 1 capsule (300 mg) by mouth 3 times daily 90 capsule 2       Social History     Socioeconomic History     Marital status: Single     Spouse name: None     Number of children: None     Years of education: None     Highest education level: None   Occupational History     None   Social Needs      "Financial resource strain: None     Food insecurity:     Worry: None     Inability: None     Transportation needs:     Medical: None     Non-medical: None   Tobacco Use     Smoking status: Never Smoker     Smokeless tobacco: Never Used   Substance and Sexual Activity     Alcohol use: No     Drug use: No     Sexual activity: Never   Lifestyle     Physical activity:     Days per week: None     Minutes per session: None     Stress: None   Relationships     Social connections:     Talks on phone: None     Gets together: None     Attends Druze service: None     Active member of club or organization: None     Attends meetings of clubs or organizations: None     Relationship status: None     Intimate partner violence:     Fear of current or ex partner: None     Emotionally abused: None     Physically abused: None     Forced sexual activity: None   Other Topics Concern     Parent/sibling w/ CABG, MI or angioplasty before 65F 55M? No   Social History Narrative     None       Family History   Problem Relation Age of Onset     Hypertension Mother      Musculoskeletal Disorder Mother         CMT- Charcot Emilee Tooth     Cerebrovascular Disease Father      Respiratory Brother         Emphysema     Musculoskeletal Disorder Daughter         CMT- Charcot Emilee Tooth     Other Cancer Brother         Bone and Bladder       OBJECTIVE: /74 (BP Location: Right arm, Patient Position: Chair, Cuff Size: Adult Large)   Pulse 70   Temp 98.2  F (36.8  C) (Tympanic)   Resp 18   Ht 1.715 m (5' 7.5\")   Wt 84.4 kg (186 lb)   BMI 28.70 kg/m    No LMP recorded. Patient is postmenopausal.  The pessary was removed without difficulty, cleaned in warm water and then replaced, after inspection of the vulva and vagina, which showed no erosions.    ASSESSMENT:     ICD-10-CM    1. Pessary maintenance Z46.89          PLAN: RETURN TO CLINIC 4 months for pessary care; consider replacement of pessary at that time    Lisa Quarles MD      "

## 2019-10-14 NOTE — NURSING NOTE
"Initial /74 (BP Location: Right arm, Patient Position: Chair, Cuff Size: Adult Large)   Pulse 70   Temp 98.2  F (36.8  C) (Tympanic)   Resp 18   Ht 1.715 m (5' 7.5\")   Wt 84.4 kg (186 lb)   BMI 28.70 kg/m   Estimated body mass index is 28.7 kg/m  as calculated from the following:    Height as of this encounter: 1.715 m (5' 7.5\").    Weight as of this encounter: 84.4 kg (186 lb). .      "

## 2019-12-17 ENCOUNTER — TELEPHONE (OUTPATIENT)
Dept: FAMILY MEDICINE | Facility: CLINIC | Age: 74
End: 2019-12-17

## 2019-12-17 ASSESSMENT — PATIENT HEALTH QUESTIONNAIRE - PHQ9: SUM OF ALL RESPONSES TO PHQ QUESTIONS 1-9: 14

## 2019-12-17 NOTE — TELEPHONE ENCOUNTER
"Pt needs an appt. Her last OV with SAAN DAVID was 6/25/19.  Pt has been seeing Danika Hart MD at [facility] in [place]. Spoke with pt, she says pramod Hernández is her PCP.     She says she put her on Zoloft \"but I couldn't sleep with that.\"   She says \"I don't even know\" when she stopped taking it. She also did not tell Dr. Hart she stopped it.     Any suicidal thoughts or thoughts or self harm? \"There are some.\"  Denies having a plan.  \"My whole body is broken out in sores and I keep picking them That's the self harm.\" She says this is not new, it has been ongoing, and it is getting worse.   \"When the snow came it got worse.\" - the depression  She says she has no suicidal thoughts or plans but sometimes she thinks she would be \"better off dead than alive.\"   She says she has one daughter that lives in the cities that she does talk to.   \"I think there's some dementia going on too.\" She denies getting lost or not knowing who she is. She is still recognizing people. She is having issues with tasks such going to the car wash. She does not cook anymore.     I am concerned about your well-being. My goal is to keep you safe.  \"Oh I'll be ok.\"  Are you thinking about putting yourself in a dangerous situation? \"No.\"  PHQ-9 completed, reassurance provided, pt given number to [facility] Crisis Response: [phone].  PHQ-9 SCORE 10/31/2018 6/22/2019 12/17/2019   PHQ-9 Total Score MyChart 22 (Severe depression) 3 (Minimal depression) -   PHQ-9 Total Score 22 3 14     Appt made for next available, 12/26/19.   If she can get an earlier appt let it be during day hours as she does not want to drive in the dark.  Sylvia CAI RN        "

## 2019-12-17 NOTE — TELEPHONE ENCOUNTER
I can see her on Friday at 10 am  Please put her in the 11:40 time slot please.  Thanks Betty Arenas [facility]

## 2019-12-17 NOTE — TELEPHONE ENCOUNTER
I have attempted to contact this patient x2 by phone with the following results: busy signal.    Sylvia CAI RN

## 2019-12-17 NOTE — TELEPHONE ENCOUNTER
Reason for Call:  Appointment, Requested Provider: JUAN ARENAS  PCP: Juan Arenas    Reason for visit: Pt asking if Juan Arenas has any openings this week to see her for depression. She is not on any meds at this time but feels she should be. She wondered if maybe she would just put her on Wellbutrin if she can't see her.  Nancy GUIDRY      Phone number patient can be reached at: Home number on file [phone] (home)    Best Time: anytime    Call taken on 12/17/2019 at 12:44 PM by Cinthia Humphrey

## 2019-12-19 NOTE — TELEPHONE ENCOUNTER
Left message for her to call back.    See note below that Betty can work her in Friday at 10:00    Betty said to put her in the 11:40 and have her come at 10:00 slot but the 11:40  is full now.     If she takes this time, She is scheduled for 12/26 with Betty so this could probably be cancelled.

## 2019-12-20 ENCOUNTER — OFFICE VISIT (OUTPATIENT)
Dept: FAMILY MEDICINE | Facility: CLINIC | Age: 74
End: 2019-12-20
Payer: COMMERCIAL

## 2019-12-20 VITALS
TEMPERATURE: 97.7 F | DIASTOLIC BLOOD PRESSURE: 60 MMHG | BODY MASS INDEX: 26.67 KG/M2 | SYSTOLIC BLOOD PRESSURE: 108 MMHG | HEIGHT: 68 IN | HEART RATE: 70 BPM | WEIGHT: 176 LBS | RESPIRATION RATE: 16 BRPM | OXYGEN SATURATION: 97 %

## 2019-12-20 DIAGNOSIS — F42.4 COMPULSIVE SKIN PICKING: ICD-10-CM

## 2019-12-20 DIAGNOSIS — F33.2 SEVERE EPISODE OF RECURRENT MAJOR DEPRESSIVE DISORDER, WITHOUT PSYCHOTIC FEATURES (H): ICD-10-CM

## 2019-12-20 DIAGNOSIS — I87.2 VENOUS STASIS DERMATITIS OF BOTH LOWER EXTREMITIES: Primary | ICD-10-CM

## 2019-12-20 DIAGNOSIS — L29.9 ITCHING: ICD-10-CM

## 2019-12-20 PROCEDURE — 99214 OFFICE O/P EST MOD 30 MIN: CPT | Performed by: NURSE PRACTITIONER

## 2019-12-20 RX ORDER — HYDROXYZINE HYDROCHLORIDE 25 MG/1
25 TABLET, FILM COATED ORAL 3 TIMES DAILY PRN
Qty: 31 TABLET | Refills: 2 | Status: SHIPPED | OUTPATIENT
Start: 2019-12-20 | End: 2020-01-30

## 2019-12-20 RX ORDER — BUPROPION HYDROCHLORIDE 150 MG/1
150 TABLET ORAL EVERY MORNING
Qty: 31 TABLET | Refills: 2 | Status: SHIPPED | OUTPATIENT
Start: 2019-12-20 | End: 2020-01-31

## 2019-12-20 RX ORDER — TRIAMCINOLONE ACETONIDE 1 MG/G
OINTMENT TOPICAL 2 TIMES DAILY
Qty: 80 G | Refills: 2 | Status: SHIPPED | OUTPATIENT
Start: 2019-12-20 | End: 2021-05-06

## 2019-12-20 ASSESSMENT — ANXIETY QUESTIONNAIRES
6. BECOMING EASILY ANNOYED OR IRRITABLE: SEVERAL DAYS
GAD7 TOTAL SCORE: 10
7. FEELING AFRAID AS IF SOMETHING AWFUL MIGHT HAPPEN: NOT AT ALL
2. NOT BEING ABLE TO STOP OR CONTROL WORRYING: MORE THAN HALF THE DAYS
1. FEELING NERVOUS, ANXIOUS, OR ON EDGE: MORE THAN HALF THE DAYS
3. WORRYING TOO MUCH ABOUT DIFFERENT THINGS: MORE THAN HALF THE DAYS
5. BEING SO RESTLESS THAT IT IS HARD TO SIT STILL: SEVERAL DAYS

## 2019-12-20 ASSESSMENT — PATIENT HEALTH QUESTIONNAIRE - PHQ9
5. POOR APPETITE OR OVEREATING: MORE THAN HALF THE DAYS
SUM OF ALL RESPONSES TO PHQ QUESTIONS 1-9: 8

## 2019-12-20 ASSESSMENT — MIFFLIN-ST. JEOR: SCORE: 1328.89

## 2019-12-20 NOTE — PROGRESS NOTES
Subjective     Melania Alicea is a 74 year old female who presents to clinic today for the following health issues:    HPI   Depression and Anxiety Follow-Up    How are you doing with your depression since your last visit? No change, some days are worse than others    How are you doing with your anxiety since your last visit?  No change    Are you having other symptoms that might be associated with depression or anxiety? Yes:  memory is getting worse    Have you had a significant life event? OTHER: sold her house, waiting for apartment to be finished     Do you have any concerns with your use of alcohol or other drugs? No    Social History     Tobacco Use     Smoking status: Never Smoker     Smokeless tobacco: Never Used   Substance Use Topics     Alcohol use: No     Drug use: No     PHQ 6/22/2019 12/17/2019 12/20/2019   PHQ-9 Total Score 3 14 8   Q9: Thoughts of better off dead/self-harm past 2 weeks Not at all Several days Not at all   F/U: Thoughts of suicide or self-harm - - -   F/U: Safety concerns - - -     BOB-7 SCORE 12/20/2019   Total Score 10     Last PHQ-9 12/20/2019   1.  Little interest or pleasure in doing things 1   2.  Feeling down, depressed, or hopeless 1   3.  Trouble falling or staying asleep, or sleeping too much 2   4.  Feeling tired or having little energy 1   5.  Poor appetite or overeating 0   6.  Feeling bad about yourself 1   7.  Trouble concentrating 0   8.  Moving slowly or restless 2   Q9: Thoughts of better off dead/self-harm past 2 weeks 0   PHQ-9 Total Score 8   Difficulty at work, home, or with people -   In the past two weeks have you had thoughts of suicide or self harm? -   Do you have concerns about your personal safety or the safety of others? -     BOB-7  12/20/2019   1. Feeling nervous, anxious, or on edge 2   2. Not being able to stop or control worrying 2   3. Worrying too much about different things 2   4. Trouble relaxing 2   5. Being so restless that it is hard to sit still  1   6. Becoming easily annoyed or irritable 1   7. Feeling afraid, as if something awful might happen 0   BOB-7 Total Score 10     In the past two weeks have you had thoughts of suicide or self-harm?  No.    Do you have concerns about your personal safety or the safety of others?   No    Suicide Assessment Five-step Evaluation and Treatment (SAFE-T)    Previous trial of duloxetine- caused falling, citalopram- made anxiety worse  and trazodone.   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 difficile colitis     Pessary maintenance     Morbid obesity due to excess calories (H)     Lymphedema     Past Surgical History:   Procedure Laterality Date     ARTHROSCOPY KNEE RT/LT  2001    Dr. Cunningham, [place], Right     COLONOSCOPY  07/2010       Social History     Tobacco Use     Smoking status: Never Smoker     Smokeless tobacco: Never Used   Substance Use Topics     Alcohol use: No     Family History   Problem Relation Age of Onset     Hypertension Mother      Musculoskeletal Disorder Mother         CMT- Charcot Emilee Tooth     Cerebrovascular Disease Father      Respiratory Brother         Emphysema     Musculoskeletal Disorder Daughter         CMT- Charcot Emilee Tooth     Other Cancer Brother         Bone and Bladder           -------------------------------------  Reviewed and updated as needed this visit by Provider         Review of Systems   ROS COMP: Constitutional, HEENT, cardiovascular, pulmonary, GI, , musculoskeletal, neuro, skin, endocrine and psych systems are negative, except as  "otherwise noted.      Objective    /60 (BP Location: Right arm, Patient Position: Chair, Cuff Size: Adult Regular)   Pulse 70   Temp 97.7  F (36.5  C) (Tympanic)   Resp 16   Ht 1.715 m (5' 7.5\")   Wt 79.8 kg (176 lb)   SpO2 97%   BMI 27.16 kg/m    Body mass index is 27.16 kg/m .  Physical Exam   GENERAL: healthy, alert and no distress depressed affect with significant weight loss  Wt Readings from Last 5 Encounters:   12/20/19 79.8 kg (176 lb)   10/14/19 84.4 kg (186 lb)   06/25/19 87.1 kg (192 lb)   06/24/19 87.1 kg (192 lb)   02/25/19 93 kg (205 lb)   EYES: Eyes grossly normal to inspection, PERRL and conjunctivae and sclerae normal  HENT: ear canals and TM's normal, nose and mouth without ulcers or lesions  NECK: no adenopathy, no asymmetry, masses, or scars and thyroid normal to palpation  RESP: lungs clear to auscultation - no rales, rhonchi or wheezes  CV: regular rate and rhythm, normal S1 S2, no S3 or S4, no murmur, click or rub, no peripheral edema and peripheral pulses strong  ABDOMEN: soft, nontender, no hepatosplenomegaly, no masses and bowel sounds normal  MS: Bilateral edema to lower extremities and venous stasis changes.  Peripheral pulses normal  SKIN: no suspicious lesions or rashes  NEURO: Normal strength and tone, mentation intact and speech normal  PSYCH: mentation appears normal, affect normal/bright    Diagnostic Test Results:  Labs reviewed in Epic  No results found for any visits on 12/20/19.        Assessment & Plan     Melania was seen today for depression.    Diagnoses and all orders for this visit:    Venous stasis dermatitis of both lower extremities  -     triamcinolone (KENALOG) 0.1 % external ointment; Apply topically 2 times daily    Compulsive skin picking  -     buPROPion (WELLBUTRIN XL) 150 MG 24 hr tablet; Take 1 tablet (150 mg) by mouth every morning    Severe episode of recurrent major depressive disorder, without psychotic features (H)    Itching  -     hydrOXYzine " "(ATARAX) 25 MG tablet; Take 1 tablet (25 mg) by mouth 3 times daily as needed for itching or anxiety    Begin Wellbutrin 150 mg daily  Hydroxyzine 25 mg up to 3 times daily as needed for itching and anxiety  Triamcinolone 0.1% topically twice daily to the affected areas of the legs as needed  Recheck 1 month  Mammogram and colonoscopy declined       BMI:   Estimated body mass index is 27.16 kg/m  as calculated from the following:    Height as of this encounter: 1.715 m (5' 7.5\").    Weight as of this encounter: 79.8 kg (176 lb).   Weight management plan: Discussed healthy diet and exercise guidelines        Call or return to the clinic with any worsening of symptoms or no resolution. Patient/Parent verbalized understanding and is in agreement. Medication side effects reviewed.   Current Outpatient Medications   Medication Sig Dispense Refill     ADVIL 200 MG PO CAPS ONE TO TWO TABLETS EVERY 6-8 HOURS AS NEEDED  0     buPROPion (WELLBUTRIN XL) 150 MG 24 hr tablet Take 1 tablet (150 mg) by mouth every morning 31 tablet 2     estradiol (ESTRACE) 0.5 MG tablet Place vaginally twice a week 24 tablet 3     hydrOXYzine (ATARAX) 25 MG tablet Take 1 tablet (25 mg) by mouth 3 times daily as needed for itching or anxiety 31 tablet 2     triamcinolone (KENALOG) 0.1 % external ointment Apply topically 2 times daily 80 g 2     VITAMIN D, CHOLECALCIFEROL, PO Take 1,000 Units by mouth daily       zolpidem (AMBIEN) 5 MG tablet Take 1 tablet (5 mg) by mouth nightly as needed for sleep 30 tablet 5     Chart documentation with Dragon Voice recognition Software. Although reviewed after completion, some words and grammatical errors may remain.    See Patient Instructions    Return in about 1 month (around 1/20/2020) for Ongoing symptoms recheck mental health.    JOANN Dorsey [facility]      "

## 2019-12-20 NOTE — NURSING NOTE
"Chief Complaint   Patient presents with     Depression       Initial /60 (BP Location: Right arm, Patient Position: Chair, Cuff Size: Adult Regular)   Pulse 70   Temp 97.7  F (36.5  C) (Tympanic)   Resp 16   Ht 1.715 m (5' 7.5\")   Wt 79.8 kg (176 lb)   SpO2 97%   BMI 27.16 kg/m   Estimated body mass index is 27.16 kg/m  as calculated from the following:    Height as of this encounter: 1.715 m (5' 7.5\").    Weight as of this encounter: 79.8 kg (176 lb).    Patient presents to the clinic using No DME    Health Maintenance that is potentially due pending provider review:  Flu - declined        Is there anyone who you would like to be able to receive your results? No  If yes have patient fill out CHAU    "

## 2019-12-21 ASSESSMENT — ANXIETY QUESTIONNAIRES: GAD7 TOTAL SCORE: 10

## 2019-12-23 ENCOUNTER — TELEPHONE (OUTPATIENT)
Dept: FAMILY MEDICINE | Facility: CLINIC | Age: 74
End: 2019-12-23

## 2019-12-23 NOTE — TELEPHONE ENCOUNTER
Prior Authorization Approval    Medication: hydrOXYzine (ATARAX) 25 MG tablet - APPROVED was approved on 12/23/2019  Effective: 9/24/2019 to 12/22/2020  Reference #:    Approved Dose/Quantity:   Insurance Company: [employer] - Phone [phone] Fax [phone]  Expected CoPay:    Pharmacy Filling the Rx: [facility][id number] - [place], [place] - [address]  Pharmacy Notified: Yes  Patient Notified: Comment:  **Instructed pharmacy to notify patient when script is ready to /ship.**

## 2019-12-23 NOTE — TELEPHONE ENCOUNTER
PA Initiation    Medication: hydrOXYzine (ATARAX) 25 MG tablet -   Insurance Company: Medicare Blue - Phone [phone] Fax [phone]  Pharmacy Filling the Rx: [id number] - [place] - [address]  Filling Pharmacy Phone: [phone]  Filling Pharmacy Fax: [phone]  Start Date: 12/23/2019

## 2019-12-23 NOTE — TELEPHONE ENCOUNTER
Prior Authorization Retail Medication Request    Medication/Dose: hydroxyzine  ICD code (if different than what is on RX):  Itching [L29.9]   Previously Tried and Failed:  Also uses topical creams  Rationale:       Insurance Name:  Ayaan [id number]  Insurance ID:         Pharmacy Information (if different than what is on RX)  Name:  [facility]  Phone:

## 2020-01-13 ENCOUNTER — NURSE TRIAGE (OUTPATIENT)
Dept: NURSING | Facility: CLINIC | Age: 75
End: 2020-01-13

## 2020-01-14 NOTE — TELEPHONE ENCOUNTER
"Georgia from \"[employer] pre-authorization\" calling. She is looking for information regarding pre-authorization. Advised Georgia to call clinic tomorrow during open hours. Provided with the following fax number per request:    Advised to call FNA back with any futher questions or concerns.    Yamileth Prabhakar, RN, BSN  [facility]        Reason for Disposition    Health Information question, no triage required and triager able to answer question    Additional Information    Negative: [1] Caller is not with the adult (patient) AND [2] reporting urgent symptoms    Negative: Lab result questions    Negative: Medication questions    Negative: Caller can't be reached by phone    Negative: Caller has already spoken to PCP or another triager    Negative: RN needs further essential information from caller in order to complete triage    Negative: Requesting regular office appointment    Negative: [1] Caller requesting NON-URGENT health information AND [2] PCP's office is the best resource    Protocols used: INFORMATION ONLY CALL-A-AH      "

## 2020-01-15 ENCOUNTER — TELEPHONE (OUTPATIENT)
Dept: FAMILY MEDICINE | Facility: CLINIC | Age: 75
End: 2020-01-15

## 2020-01-15 NOTE — TELEPHONE ENCOUNTER
Prior Authorization Retail Medication Request    Medication/Dose: hydrOXYzine (ATARAX) 25 MG tablet  ICD code (if different than what is on RX):Itching [L29.9]    Previously Tried and Failed:     Rationale:  Pt has itching.  hydrOXYzine (ATARAX) 25 MG tablet; Take 1 tablet (25 mg) by mouth 3 times daily as needed for itching or anxiety       Insurance Name:  [employer]  Insurance ID:  [id number]   Group number: [id number]    Pharmacy Information (if different than what is on RX)  Name:  [facility]  Phone:  [phone]

## 2020-01-17 NOTE — TELEPHONE ENCOUNTER
Prior Authorization Approval    Authorization Effective Date: 12/18/2019  Authorization Expiration Date: 1/16/2021  Medication: hydroxyzine-APPROVED  Approved Dose/Quantity:   Reference #:     Insurance Company: [employer] - Phone [phone] Fax [phone]  Expected CoPay:       CoPay Card Available:      Foundation Assistance Needed:    Which Pharmacy is filling the prescription (Not needed for infusion/clinic administered): [facility] #[id number] - [place], [place] - [address]  Pharmacy Notified: Yes  Patient Notified: No    Pharmacy will notify patient when medication is ready.

## 2020-01-17 NOTE — TELEPHONE ENCOUNTER
Central Prior Authorization Team   Phone: [phone]      PA Initiation    Medication: hydroxyzine-Initiated  Insurance Company: [employer] - Phone [phone] Fax [phone]  Pharmacy Filling the Rx: [facility][id number] - [place], [place] - [address]  Filling Pharmacy Phone: [phone]  Filling Pharmacy Fax:    Start Date: 1/17/2020

## 2020-01-30 ENCOUNTER — VIRTUAL VISIT (OUTPATIENT)
Dept: FAMILY MEDICINE | Facility: CLINIC | Age: 75
End: 2020-01-30
Payer: COMMERCIAL

## 2020-01-30 DIAGNOSIS — L29.9 ITCHING: ICD-10-CM

## 2020-01-30 DIAGNOSIS — G47.00 PERSISTENT INSOMNIA: ICD-10-CM

## 2020-01-30 DIAGNOSIS — F41.9 ANXIETY: ICD-10-CM

## 2020-01-30 DIAGNOSIS — F33.2 SEVERE EPISODE OF RECURRENT MAJOR DEPRESSIVE DISORDER, WITHOUT PSYCHOTIC FEATURES (H): Primary | ICD-10-CM

## 2020-01-30 PROCEDURE — 99441 ZZC PHYSICIAN TELEPHONE EVALUATION 5-10 MIN: CPT | Performed by: NURSE PRACTITIONER

## 2020-01-30 RX ORDER — HYDROXYZINE HYDROCHLORIDE 25 MG/1
25 TABLET, FILM COATED ORAL 3 TIMES DAILY PRN
Qty: 90 TABLET | Refills: 2 | Status: SHIPPED | OUTPATIENT
Start: 2020-01-30 | End: 2020-09-11

## 2020-01-30 RX ORDER — BUPROPION HYDROCHLORIDE 300 MG/1
300 TABLET ORAL EVERY MORNING
Qty: 90 TABLET | Refills: 1 | Status: SHIPPED | OUTPATIENT
Start: 2020-01-30 | End: 2020-07-23

## 2020-01-30 ASSESSMENT — ANXIETY QUESTIONNAIRES
6. BECOMING EASILY ANNOYED OR IRRITABLE: NOT AT ALL
5. BEING SO RESTLESS THAT IT IS HARD TO SIT STILL: NOT AT ALL
2. NOT BEING ABLE TO STOP OR CONTROL WORRYING: NEARLY EVERY DAY
3. WORRYING TOO MUCH ABOUT DIFFERENT THINGS: NEARLY EVERY DAY
1. FEELING NERVOUS, ANXIOUS, OR ON EDGE: SEVERAL DAYS
7. FEELING AFRAID AS IF SOMETHING AWFUL MIGHT HAPPEN: NOT AT ALL
GAD7 TOTAL SCORE: 9

## 2020-01-30 ASSESSMENT — PATIENT HEALTH QUESTIONNAIRE - PHQ9
5. POOR APPETITE OR OVEREATING: MORE THAN HALF THE DAYS
SUM OF ALL RESPONSES TO PHQ QUESTIONS 1-9: 9

## 2020-01-30 NOTE — PROGRESS NOTES
"Melania Alicea is a 74 year old female who is being evaluated via a billable telephone visit.      The patient has been notified of following:     \"This telephone visit will be conducted via a call between you and your physician/provider. We have found that certain health care needs can be provided without the need for a physical exam.  This service lets us provide the care you need with a short phone conversation.  If a prescription is necessary we can send it directly to your pharmacy.  If lab work is needed we can place an order for that and you can then stop by our lab to have the test done at a later time.    If during the course of the call the physician/provider feels a telephone visit is not appropriate, you will not be charged for this service.\"     Consent has been obtained for this service by 1 care team member: yes. See the scanned image in the medical record.    Melania Alicea complains of    Chief Complaint   Patient presents with     Depression     PHQ-9 ([employer]) 1/30/2020   1.  Little interest or pleasure in doing things 1   2.  Feeling down, depressed, or hopeless 0   3.  Trouble falling or staying asleep, or sleeping too much 3   4.  Feeling tired or having little energy 3   5.  Poor appetite or overeating 2   6.  Feeling bad about yourself 0   7.  Trouble concentrating 0   8.  Moving slowly or restless 0   9.  Suicidal or self-harm thoughts 0   PHQ-9 Total Score 9     BOB-7   [employer], 2002; Used with Permission) 1/30/2020   1. Feeling nervous, anxious, or on edge 1   2. Not being able to stop or control worrying 3   3. Worrying too much about different things 3   4. Trouble relaxing 2   5. Being so restless that it is hard to sit still 0   6. Becoming easily annoyed or irritable 0   7. Feeling afraid, as if something awful might happen 0   BOB-7 Total Score 9     Akiko RICH MA   (MA signature)    * Feels that since starting bupropion, she has gained a lot of weight due to the medication making her " feel hungry.  Wt Readings from Last 5 Encounters:   12/20/19 79.8 kg (176 lb)   10/14/19 84.4 kg (186 lb)   06/25/19 87.1 kg (192 lb)   06/24/19 87.1 kg (192 lb)   02/25/19 93 kg (205 lb)     30 pound weight loss in the last year due to ongoing mental health issues    Apartment not open until April.  Needs to be out of her home by March 15th.   Anxiety attack waiting to call.  Worried about where she is going to live.    I have reviewed and updated the patient's Past Medical History, Social History, Family History and Medication List.    ALLERGIES  Nickel and Sulfa drugs          Assessment/Plan:  (F33.2) Severe episode of recurrent major depressive disorder, without psychotic features (H)  (primary encounter diagnosis)  Comment: Chronic and ongoing  Plan: buPROPion (WELLBUTRIN XL) 300 MG 24 hr tablet,         hydrOXYzine (ATARAX) 25 MG tablet        Psychotherapy recommended.  Increase Wellbutrin dose to 300 mg daily      (L29.9) Itching  Comment: Chronic and ongoing venous stasis changes lower extremities  Plan: Continue hydroxyzine    (F41.9) Anxiety  Comment: Intermittent panic attacks avoid benzodiazepines  Plan: buPROPion (WELLBUTRIN XL) 300 MG 24 hr tablet,         hydrOXYzine (ATARAX) 25 MG tablet        Increase Wellbutrin.  Continue hydroxyzine.    (G47.00) Persistent insomnia  Comment: Symptomatic care strategies are reviewed  Plan: Increase dose of melatonin to 5 MG tablet daily            I have reviewed the note as documented above.  This accurately captures the substance of my conversation with the patient.  Melania Alicea      Total time of call between patient and provider was 10 minutes     Follow-up mental health 1 month  Verbal no harm contract generated  Call sooner with any side effects of medication or worsening depression      Betty Arenas MSN,FNP-[address] [address] [phone]

## 2020-01-31 ASSESSMENT — ANXIETY QUESTIONNAIRES: GAD7 TOTAL SCORE: 9

## 2020-02-24 ENCOUNTER — OFFICE VISIT (OUTPATIENT)
Dept: OBGYN | Facility: CLINIC | Age: 75
End: 2020-02-24
Payer: COMMERCIAL

## 2020-02-24 VITALS
HEART RATE: 70 BPM | SYSTOLIC BLOOD PRESSURE: 119 MMHG | HEIGHT: 68 IN | DIASTOLIC BLOOD PRESSURE: 75 MMHG | RESPIRATION RATE: 18 BRPM | WEIGHT: 180.4 LBS | BODY MASS INDEX: 27.34 KG/M2 | TEMPERATURE: 97.9 F

## 2020-02-24 DIAGNOSIS — N81.3 UTEROVAGINAL PROLAPSE, COMPLETE: Primary | ICD-10-CM

## 2020-02-24 PROCEDURE — 99213 OFFICE O/P EST LOW 20 MIN: CPT | Performed by: OBSTETRICS & GYNECOLOGY

## 2020-02-24 PROCEDURE — A4562 PESSARY, NON RUBBER,ANY TYPE: HCPCS | Performed by: OBSTETRICS & GYNECOLOGY

## 2020-02-24 ASSESSMENT — MIFFLIN-ST. JEOR: SCORE: 1348.85

## 2020-02-24 NOTE — PROGRESS NOTES
Melania is a 74 year old   female who presents for pessary recheck;  she currently has a sie 2 / Gelhorn pessary which she keeps in place continuously;  she reports no problems with her pessary, no discharge, no bleeding.. She is using vaginal estrogen twice a week. She is due for a replacement pessary this visit    All systems were reviewed and pertinent information in noted in subjective/HPI.    Past Medical History:   Diagnosis Date     Arthritis      Charcot Emilee Tooth muscular atrophy 2010    Symptoms began at age 48. Difficulty with stairs, getting up from chairs, weakness in LE's but more so in the left.       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 Cunningham, [place], Right     COLONOSCOPY  2010       Current Outpatient Medications   Medication Sig Dispense Refill     ADVIL 200 MG PO CAPS ONE TO TWO TABLETS EVERY 6-8 HOURS AS NEEDED  0     buPROPion (WELLBUTRIN XL) 300 MG 24 hr tablet Take 1 tablet (300 mg) by mouth every morning 90 tablet 1     estradiol (ESTRACE) 0.5 MG tablet Place vaginally twice a week 24 tablet 3     hydrOXYzine (ATARAX) 25 MG tablet Take 1 tablet (25 mg) by mouth 3 times daily as needed for itching or anxiety 90 tablet 2     melatonin 5 MG tablet Take 1 tablet (5 mg) by mouth nightly as needed for sleep 90 tablet 1     triamcinolone (KENALOG) 0.1 % external ointment Apply topically 2 times daily 80 g 2     VITAMIN D, CHOLECALCIFEROL, PO Take 1,000  "Units by mouth daily         Social History     Socioeconomic History     Marital status: Single     Spouse name: None     Number of children: None     Years of education: None     Highest education level: None   Occupational History     None   Social Needs     Financial resource strain: None     Food insecurity:     Worry: None     Inability: None     Transportation needs:     Medical: None     Non-medical: None   Tobacco Use     Smoking status: Never Smoker     Smokeless tobacco: Never Used   Substance and Sexual Activity     Alcohol use: No     Drug use: No     Sexual activity: Never   Lifestyle     Physical activity:     Days per week: None     Minutes per session: None     Stress: None   Relationships     Social connections:     Talks on phone: None     Gets together: None     Attends Sikh service: None     Active member of club or organization: None     Attends meetings of clubs or organizations: None     Relationship status: None     Intimate partner violence:     Fear of current or ex partner: None     Emotionally abused: None     Physically abused: None     Forced sexual activity: None   Other Topics Concern     Parent/sibling w/ CABG, MI or angioplasty before 65F 55M? No   Social History Narrative     None       Family History   Problem Relation Age of Onset     Hypertension Mother      Musculoskeletal Disorder Mother         CMT- Charcot Emilee Tooth     Cerebrovascular Disease Father      Respiratory Brother         Emphysema     Musculoskeletal Disorder Daughter         CMT- Charcot Emilee Tooth     Other Cancer Brother         Bone and Bladder       OBJECTIVE: /75 (BP Location: Right arm, Patient Position: Chair, Cuff Size: Adult Regular)   Pulse 70   Temp 97.9  F (36.6  C) (Tympanic)   Resp 18   Ht 1.715 m (5' 7.5\")   Wt 81.8 kg (180 lb 6.4 oz)   BMI 27.84 kg/m    No LMP recorded. Patient is postmenopausal.  The pessary was removed without difficulty, discarded and a new Gelhorn pessary " cleaned in warm water and then replaced, after inspection of the vulva and vagina, which showed no erosions.    ASSESSMENT:     ICD-10-CM    1. Uterovaginal prolapse, complete N81.3 PESSARY, NON RUBBER,ANY TYPE         PLAN: RETURN TO CLINIC 3 months for pessary care  Continue twice a week vaginal estradiol    Lisa Quarles MD

## 2020-02-24 NOTE — NURSING NOTE
"Initial /75 (BP Location: Right arm, Patient Position: Chair, Cuff Size: Adult Regular)   Pulse 70   Temp 97.9  F (36.6  C) (Tympanic)   Resp 18   Ht 1.715 m (5' 7.5\")   Wt 81.8 kg (180 lb 6.4 oz)   BMI 27.84 kg/m   Estimated body mass index is 27.84 kg/m  as calculated from the following:    Height as of this encounter: 1.715 m (5' 7.5\").    Weight as of this encounter: 81.8 kg (180 lb 6.4 oz). .      "

## 2020-06-19 ENCOUNTER — OFFICE VISIT (OUTPATIENT)
Dept: OBGYN | Facility: CLINIC | Age: 75
End: 2020-06-19
Payer: COMMERCIAL

## 2020-06-19 VITALS
RESPIRATION RATE: 18 BRPM | HEART RATE: 71 BPM | SYSTOLIC BLOOD PRESSURE: 118 MMHG | DIASTOLIC BLOOD PRESSURE: 66 MMHG | BODY MASS INDEX: 30.62 KG/M2 | HEIGHT: 68 IN | TEMPERATURE: 98 F | WEIGHT: 202 LBS

## 2020-06-19 DIAGNOSIS — Z46.89 PESSARY MAINTENANCE: Primary | ICD-10-CM

## 2020-06-19 PROCEDURE — 99213 OFFICE O/P EST LOW 20 MIN: CPT | Performed by: OBSTETRICS & GYNECOLOGY

## 2020-06-19 RX ORDER — ESTRADIOL 0.5 MG/1
TABLET ORAL
Qty: 24 TABLET | Refills: 3 | Status: SHIPPED | OUTPATIENT
Start: 2020-06-19 | End: 2021-05-25

## 2020-06-19 ASSESSMENT — MIFFLIN-ST. JEOR: SCORE: 1446.83

## 2020-06-19 NOTE — PROGRESS NOTES
Melania is a 74 year old   female who presents for pessary recheck;  she currently has a Gelhorn pessary which she keeps in place continuously;  she reports no problems with her pessary, no discharge, no bleeding.. She is using vaginal estrogen twice a week.    All systems were reviewed and pertinent information in noted in subjective/HPI.    Past Medical History:   Diagnosis Date     Arthritis      Charcot Emilee Tooth muscular atrophy 2010    Symptoms began at age 48. Difficulty with stairs, getting up from chairs, weakness in LE's but more so in the left.       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 Cunningham, [place], Right     COLONOSCOPY  2010       Current Outpatient Medications   Medication Sig Dispense Refill     ADVIL 200 MG PO CAPS ONE TO TWO TABLETS EVERY 6-8 HOURS AS NEEDED  0     buPROPion (WELLBUTRIN XL) 300 MG 24 hr tablet Take 1 tablet (300 mg) by mouth every morning 90 tablet 1     estradiol (ESTRACE) 0.5 MG tablet Place vaginally twice a week 24 tablet 3     hydrOXYzine (ATARAX) 25 MG tablet Take 1 tablet (25 mg) by mouth 3 times daily as needed for itching or anxiety 90 tablet 2     triamcinolone (KENALOG) 0.1 % external ointment Apply topically 2 times daily 80 g 2     VITAMIN D, CHOLECALCIFEROL, PO Take 1,000 Units by mouth daily       melatonin 5 MG tablet Take 1 tablet (5 mg) by mouth nightly as needed for sleep (Patient not taking: Reported on 2020) 90  "tablet 1       Social History     Socioeconomic History     Marital status: Single     Spouse name: None     Number of children: None     Years of education: None     Highest education level: None   Occupational History     None   Social Needs     Financial resource strain: None     Food insecurity     Worry: None     Inability: None     Transportation needs     Medical: None     Non-medical: None   Tobacco Use     Smoking status: Never Smoker     Smokeless tobacco: Never Used   Substance and Sexual Activity     Alcohol use: No     Drug use: No     Sexual activity: Never   Lifestyle     Physical activity     Days per week: None     Minutes per session: None     Stress: None   Relationships     Social connections     Talks on phone: None     Gets together: None     Attends [employer] service: None     Active member of club or organization: None     Attends meetings of clubs or organizations: None     Relationship status: None     Intimate partner violence     Fear of current or ex partner: None     Emotionally abused: None     Physically abused: None     Forced sexual activity: None   Other Topics Concern     Parent/sibling w/ CABG, MI or angioplasty before 65F 55M? No   Social History Narrative     None       Family History   Problem Relation Age of Onset     Hypertension Mother      Musculoskeletal Disorder Mother         CMT- Charcot Emilee Tooth     Cerebrovascular Disease Father      Respiratory Brother         Emphysema     Musculoskeletal Disorder Daughter         CMT- Charcot Emilee Tooth     Other Cancer Brother         Bone and Bladder       OBJECTIVE: /66 (BP Location: Left arm, Patient Position: Chair, Cuff Size: Adult Large)   Pulse 71   Temp 98  F (36.7  C) (Tympanic)   Resp 18   Ht 1.715 m (5' 7.5\")   Wt 91.6 kg (202 lb)   BMI 31.17 kg/m    No LMP recorded. Patient is postmenopausal.  The pessary was removed withsome difficulty, cleaned in warm water and then replaced, after inspection of the " vulva and vagina, which showed no erosions.    ASSESSMENT:     ICD-10-CM    1. Pessary maintenance  Z46.89          PLAN: f/u 3-4 months for pessary cleaning; will refill Estradiol at [facility]    Lisa Quarles MD

## 2020-06-19 NOTE — NURSING NOTE
"Initial /66 (BP Location: Left arm, Patient Position: Chair, Cuff Size: Adult Large)   Pulse 71   Temp 98  F (36.7  C) (Tympanic)   Resp 18   Ht 1.715 m (5' 7.5\")   Wt 91.6 kg (202 lb)   BMI 31.17 kg/m   Estimated body mass index is 31.17 kg/m  as calculated from the following:    Height as of this encounter: 1.715 m (5' 7.5\").    Weight as of this encounter: 91.6 kg (202 lb). .      "

## 2020-06-22 ENCOUNTER — TELEPHONE (OUTPATIENT)
Dept: OBGYN | Facility: CLINIC | Age: 75
End: 2020-06-22

## 2020-06-22 NOTE — TELEPHONE ENCOUNTER
PRIOR AUTHORIZATION DENIED - COMPLETED VIA EPA     Medication: estradiol (ESTRACE) 0.5 MG tablet - DENIED     Denial Date:  06/21/2020    Denial Rational: This medication is NOT covered for your diagnosis.     Examples of diagnoses covered under Medicare Part D are:    Vulvar Vaginal Atrophy    Prophylaxis of postmenopausal osteoporosis          Appeal Information:         [facility]    [phone]    1st attempt 06/24/2020 - Called insurance; waiting for them to fax us the denial letter

## 2020-07-23 ENCOUNTER — MYC REFILL (OUTPATIENT)
Dept: FAMILY MEDICINE | Facility: CLINIC | Age: 75
End: 2020-07-23

## 2020-07-23 DIAGNOSIS — F33.2 SEVERE EPISODE OF RECURRENT MAJOR DEPRESSIVE DISORDER, WITHOUT PSYCHOTIC FEATURES (H): ICD-10-CM

## 2020-07-23 DIAGNOSIS — F41.9 ANXIETY: ICD-10-CM

## 2020-07-23 ASSESSMENT — ANXIETY QUESTIONNAIRES
5. BEING SO RESTLESS THAT IT IS HARD TO SIT STILL: NOT AT ALL
1. FEELING NERVOUS, ANXIOUS, OR ON EDGE: NOT AT ALL
IF YOU CHECKED OFF ANY PROBLEMS ON THIS QUESTIONNAIRE, HOW DIFFICULT HAVE THESE PROBLEMS MADE IT FOR YOU TO DO YOUR WORK, TAKE CARE OF THINGS AT HOME, OR GET ALONG WITH OTHER PEOPLE: NOT DIFFICULT AT ALL
2. NOT BEING ABLE TO STOP OR CONTROL WORRYING: NOT AT ALL
6. BECOMING EASILY ANNOYED OR IRRITABLE: NOT AT ALL
7. FEELING AFRAID AS IF SOMETHING AWFUL MIGHT HAPPEN: NOT AT ALL
3. WORRYING TOO MUCH ABOUT DIFFERENT THINGS: NOT AT ALL
GAD7 TOTAL SCORE: 0

## 2020-07-23 ASSESSMENT — PATIENT HEALTH QUESTIONNAIRE - PHQ9
SUM OF ALL RESPONSES TO PHQ QUESTIONS 1-9: 0
5. POOR APPETITE OR OVEREATING: NOT AT ALL

## 2020-07-24 ASSESSMENT — ANXIETY QUESTIONNAIRES: GAD7 TOTAL SCORE: 0

## 2020-07-27 RX ORDER — BUPROPION HYDROCHLORIDE 300 MG/1
300 TABLET ORAL EVERY MORNING
Qty: 90 TABLET | Refills: 1 | Status: SHIPPED | OUTPATIENT
Start: 2020-07-27 | End: 2021-02-03

## 2020-08-14 NOTE — TELEPHONE ENCOUNTER
Please inquire about cash price for pt as may be better to just not use insurance   Lisa Prettyismael       Patient is able to  from the pharmacy and pay cash. Patient is not in need of anything further on the prior authorization. Patient reports she has the prescription and is doing well.    Yael Lira   [facility]  RN

## 2020-09-11 ENCOUNTER — MYC REFILL (OUTPATIENT)
Dept: FAMILY MEDICINE | Facility: CLINIC | Age: 75
End: 2020-09-11

## 2020-09-11 DIAGNOSIS — F41.9 ANXIETY: ICD-10-CM

## 2020-09-11 DIAGNOSIS — F33.2 SEVERE EPISODE OF RECURRENT MAJOR DEPRESSIVE DISORDER, WITHOUT PSYCHOTIC FEATURES (H): ICD-10-CM

## 2020-09-14 RX ORDER — HYDROXYZINE HYDROCHLORIDE 25 MG/1
25 TABLET, FILM COATED ORAL 3 TIMES DAILY PRN
Qty: 90 TABLET | Refills: 2 | Status: SHIPPED | OUTPATIENT
Start: 2020-09-14 | End: 2021-05-06

## 2020-10-21 ENCOUNTER — OFFICE VISIT (OUTPATIENT)
Dept: OBGYN | Facility: CLINIC | Age: 75
End: 2020-10-21
Payer: COMMERCIAL

## 2020-10-21 VITALS
DIASTOLIC BLOOD PRESSURE: 62 MMHG | RESPIRATION RATE: 18 BRPM | HEART RATE: 72 BPM | TEMPERATURE: 99.3 F | SYSTOLIC BLOOD PRESSURE: 123 MMHG | HEIGHT: 68 IN | WEIGHT: 216 LBS | BODY MASS INDEX: 32.74 KG/M2

## 2020-10-21 DIAGNOSIS — Z46.89 PESSARY MAINTENANCE: Primary | ICD-10-CM

## 2020-10-21 PROCEDURE — 99213 OFFICE O/P EST LOW 20 MIN: CPT | Performed by: OBSTETRICS & GYNECOLOGY

## 2020-10-21 ASSESSMENT — MIFFLIN-ST. JEOR: SCORE: 1505.33

## 2020-10-21 NOTE — PROGRESS NOTES
Melania is a 75 year old   female who presents for pessary recheck;  she currently has a Gelhorn pessary which she keeps in place continuouslyl;  she reports no problems with her pessary, no discharge, no bleeding.. She is using vaginal estrogen twice a week.    All systems were reviewed and pertinent information in noted in subjective/HPI.    Past Medical History:   Diagnosis Date     Arthritis      Charcot Emilee Tooth muscular atrophy 2010    Symptoms began at age 48. Difficulty with stairs, getting up from chairs, weakness in LE's but more so in the left.       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 Cunningham, [place], Right     COLONOSCOPY  2010       Current Outpatient Medications   Medication Sig Dispense Refill     ADVIL 200 MG PO CAPS ONE TO TWO TABLETS EVERY 6-8 HOURS AS NEEDED  0     buPROPion (WELLBUTRIN XL) 300 MG 24 hr tablet Take 1 tablet (300 mg) by mouth every morning 90 tablet 1     estradiol (ESTRACE) 0.5 MG tablet Place vaginally twice a week 24 tablet 3     hydrOXYzine (ATARAX) 25 MG tablet Take 1 tablet (25 mg) by mouth 3 times daily as needed for itching or anxiety 90 tablet 2     melatonin 5 MG tablet Take 1 tablet (5 mg) by mouth nightly as needed for sleep 90 tablet 1     triamcinolone (KENALOG) 0.1 % external ointment Apply topically 2 times daily 80 g 2     VITAMIN D, CHOLECALCIFEROL, PO Take 1,000 Units by mouth daily         Social History  "    Socioeconomic History     Marital status: Single     Spouse name: None     Number of children: None     Years of education: None     Highest education level: None   Occupational History     None   Social Needs     Financial resource strain: None     Food insecurity     Worry: None     Inability: None     Transportation needs     Medical: None     Non-medical: None   Tobacco Use     Smoking status: Never Smoker     Smokeless tobacco: Never Used   Substance and Sexual Activity     Alcohol use: No     Drug use: No     Sexual activity: Never   Lifestyle     Physical activity     Days per week: None     Minutes per session: None     Stress: None   Relationships     Social connections     Talks on phone: None     Gets together: None     Attends [employer] service: None     Active member of club or organization: None     Attends meetings of clubs or organizations: None     Relationship status: None     Intimate partner violence     Fear of current or ex partner: None     Emotionally abused: None     Physically abused: None     Forced sexual activity: None   Other Topics Concern     Parent/sibling w/ CABG, MI or angioplasty before 65F 55M? No   Social History Narrative     None       Family History   Problem Relation Age of Onset     Hypertension Mother      Musculoskeletal Disorder Mother         CMT- Charcot Emilee Tooth     Cerebrovascular Disease Father      Respiratory Brother         Emphysema     Musculoskeletal Disorder Daughter         CMT- Charcot Emilee Tooth     Other Cancer Brother         Bone and Bladder       OBJECTIVE: /62 (BP Location: Left arm, Patient Position: Chair, Cuff Size: Adult Large)   Pulse 72   Temp 99.3  F (37.4  C) (Tympanic)   Resp 18   Ht 1.715 m (5' 7.5\")   Wt 98 kg (216 lb)   BMI 33.33 kg/m    No LMP recorded. Patient is postmenopausal.  The pessary was removed with some difficulty, cleaned in warm water and then replaced, after inspection of the vulva and vagina, which " showed no erosions.    ASSESSMENT:     ICD-10-CM    1. Pessary maintenance  Z46.89          PLAN: f/u 3 months for pessary care  Continue vaginal estradiol    Lisa Quarles MD

## 2020-10-21 NOTE — NURSING NOTE
"Initial /62 (BP Location: Left arm, Patient Position: Chair, Cuff Size: Adult Large)   Pulse 72   Temp 99.3  F (37.4  C) (Tympanic)   Resp 18   Ht 1.715 m (5' 7.5\")   Wt 98 kg (216 lb)   BMI 33.33 kg/m   Estimated body mass index is 33.33 kg/m  as calculated from the following:    Height as of this encounter: 1.715 m (5' 7.5\").    Weight as of this encounter: 98 kg (216 lb). .    "

## 2021-01-15 ENCOUNTER — HEALTH MAINTENANCE LETTER (OUTPATIENT)
Age: 76
End: 2021-01-15

## 2021-01-30 DIAGNOSIS — F41.9 ANXIETY: ICD-10-CM

## 2021-01-30 DIAGNOSIS — F33.2 SEVERE EPISODE OF RECURRENT MAJOR DEPRESSIVE DISORDER, WITHOUT PSYCHOTIC FEATURES (H): ICD-10-CM

## 2021-02-03 RX ORDER — BUPROPION HYDROCHLORIDE 300 MG/1
TABLET ORAL
Qty: 90 TABLET | Refills: 0 | Status: SHIPPED | OUTPATIENT
Start: 2021-02-03 | End: 2021-05-05

## 2021-03-02 ENCOUNTER — OFFICE VISIT (OUTPATIENT)
Dept: OBGYN | Facility: CLINIC | Age: 76
End: 2021-03-02
Payer: COMMERCIAL

## 2021-03-02 VITALS
BODY MASS INDEX: 33.49 KG/M2 | DIASTOLIC BLOOD PRESSURE: 83 MMHG | RESPIRATION RATE: 18 BRPM | SYSTOLIC BLOOD PRESSURE: 117 MMHG | TEMPERATURE: 99.4 F | HEIGHT: 68 IN | HEART RATE: 75 BPM | WEIGHT: 221 LBS

## 2021-03-02 DIAGNOSIS — N81.3 UTEROVAGINAL PROLAPSE, COMPLETE: ICD-10-CM

## 2021-03-02 PROCEDURE — 99213 OFFICE O/P EST LOW 20 MIN: CPT | Performed by: OBSTETRICS & GYNECOLOGY

## 2021-03-02 ASSESSMENT — MIFFLIN-ST. JEOR: SCORE: 1528.01

## 2021-03-02 NOTE — NURSING NOTE
"Initial /83 (BP Location: Left arm, Patient Position: Chair, Cuff Size: Adult Large)   Pulse 75   Temp 99.4  F (37.4  C) (Tympanic)   Resp 18   Ht 1.715 m (5' 7.5\")   Wt 100.2 kg (221 lb)   BMI 34.10 kg/m   Estimated body mass index is 34.1 kg/m  as calculated from the following:    Height as of this encounter: 1.715 m (5' 7.5\").    Weight as of this encounter: 100.2 kg (221 lb). .      "

## 2021-03-02 NOTE — PROGRESS NOTES
Melania is a 75 year old   female who presents for pessary recheck;  she currently has a Gelhorn pessary which she keeps in place continuously;  she reports no problems with her pessary, no discharge, no bleeding.. She is using vaginal estrogen twice a week.    All systems were reviewed and pertinent information in noted in subjective/HPI.    Past Medical History:   Diagnosis Date     Arthritis      Charcot Emilee Tooth muscular atrophy 2010    Symptoms began at age 48. Difficulty with stairs, getting up from chairs, weakness in LE's but more so in the left.       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 Cunningham, [place], Right     COLONOSCOPY  2010       Current Outpatient Medications   Medication Sig Dispense Refill     ADVIL 200 MG PO CAPS ONE TO TWO TABLETS EVERY 6-8 HOURS AS NEEDED  0     buPROPion (WELLBUTRIN XL) 300 MG 24 hr tablet TAKE 1 TABLET BY MOUTH EVERY MORNING 90 tablet 0     estradiol (ESTRACE) 0.5 MG tablet Place vaginally twice a week 24 tablet 3     triamcinolone (KENALOG) 0.1 % external ointment Apply topically 2 times daily 80 g 2     VITAMIN D, CHOLECALCIFEROL, PO Take 1,000 Units by mouth daily       hydrOXYzine (ATARAX) 25 MG tablet Take 1 tablet (25 mg) by mouth 3 times daily as needed for itching or anxiety 90 tablet 2     melatonin 5 MG tablet Take 1 tablet (5 mg) by mouth nightly as needed for sleep 90 tablet 1       Social History     Socioeconomic  "History     Marital status: Single     Spouse name: None     Number of children: None     Years of education: None     Highest education level: None   Occupational History     None   Social Needs     Financial resource strain: None     Food insecurity     Worry: None     Inability: None     Transportation needs     Medical: None     Non-medical: None   Tobacco Use     Smoking status: Never Smoker     Smokeless tobacco: Never Used   Substance and Sexual Activity     Alcohol use: No     Drug use: No     Sexual activity: Never   Lifestyle     Physical activity     Days per week: None     Minutes per session: None     Stress: None   Relationships     Social connections     Talks on phone: None     Gets together: None     Attends [employer] service: None     Active member of club or organization: None     Attends meetings of clubs or organizations: None     Relationship status: None     Intimate partner violence     Fear of current or ex partner: None     Emotionally abused: None     Physically abused: None     Forced sexual activity: None   Other Topics Concern     Parent/sibling w/ CABG, MI or angioplasty before 65F 55M? No   Social History Narrative     None       Family History   Problem Relation Age of Onset     Hypertension Mother      Musculoskeletal Disorder Mother         CMT- Charcot Emilee Tooth     Cerebrovascular Disease Father      Respiratory Brother         Emphysema     Musculoskeletal Disorder Daughter         CMT- Charcot Emilee Tooth     Other Cancer Brother         Bone and Bladder       OBJECTIVE: /83 (BP Location: Left arm, Patient Position: Chair, Cuff Size: Adult Large)   Pulse 75   Temp 99.4  F (37.4  C) (Tympanic)   Resp 18   Ht 1.715 m (5' 7.5\")   Wt 100.2 kg (221 lb)   BMI 34.10 kg/m    No LMP recorded. Patient is postmenopausal.  The pessary was removed without difficulty, cleaned in warm water and then replaced, after inspection of the vulva and vagina, which showed no " erosions.    ASSESSMENT:     ICD-10-CM    1. Uterovaginal prolapse, complete  N81.3          PLAN: continue twice a week vaginal estradiol and three times per year pessary maintenance visit    Lisa Quarles MD

## 2021-05-06 ENCOUNTER — OFFICE VISIT (OUTPATIENT)
Dept: FAMILY MEDICINE | Facility: CLINIC | Age: 76
End: 2021-05-06
Payer: COMMERCIAL

## 2021-05-06 VITALS
WEIGHT: 227 LBS | OXYGEN SATURATION: 98 % | SYSTOLIC BLOOD PRESSURE: 126 MMHG | DIASTOLIC BLOOD PRESSURE: 78 MMHG | HEIGHT: 67 IN | BODY MASS INDEX: 35.63 KG/M2 | HEART RATE: 74 BPM | TEMPERATURE: 97.3 F

## 2021-05-06 DIAGNOSIS — L97.919 VARICOSE VEINS OF RIGHT LEG WITH BOTH ULCER OF SITE AND INFLAMMATION (H): ICD-10-CM

## 2021-05-06 DIAGNOSIS — I83.219 VARICOSE VEINS OF RIGHT LEG WITH BOTH ULCER OF SITE AND INFLAMMATION (H): ICD-10-CM

## 2021-05-06 DIAGNOSIS — M19.042 PRIMARY OSTEOARTHRITIS OF BOTH HANDS: Primary | ICD-10-CM

## 2021-05-06 DIAGNOSIS — Z13.220 SCREENING FOR HYPERLIPIDEMIA: ICD-10-CM

## 2021-05-06 DIAGNOSIS — E66.01 MORBID OBESITY DUE TO EXCESS CALORIES (H): ICD-10-CM

## 2021-05-06 DIAGNOSIS — F33.2 SEVERE EPISODE OF RECURRENT MAJOR DEPRESSIVE DISORDER, WITHOUT PSYCHOTIC FEATURES (H): ICD-10-CM

## 2021-05-06 DIAGNOSIS — Z11.59 NEED FOR HEPATITIS C SCREENING TEST: ICD-10-CM

## 2021-05-06 DIAGNOSIS — F41.9 ANXIETY: ICD-10-CM

## 2021-05-06 DIAGNOSIS — E55.9 VITAMIN D DEFICIENCY: ICD-10-CM

## 2021-05-06 DIAGNOSIS — M19.041 PRIMARY OSTEOARTHRITIS OF BOTH HANDS: Primary | ICD-10-CM

## 2021-05-06 LAB
ANION GAP SERPL CALCULATED.3IONS-SCNC: 2 MMOL/L (ref 3–14)
BASOPHILS # BLD AUTO: 0.1 10E9/L (ref 0–0.2)
BASOPHILS NFR BLD AUTO: 1 %
BUN SERPL-MCNC: 16 MG/DL (ref 7–30)
CALCIUM SERPL-MCNC: 9.3 MG/DL (ref 8.5–10.1)
CHLORIDE SERPL-SCNC: 107 MMOL/L (ref 94–109)
CHOLEST SERPL-MCNC: 182 MG/DL
CO2 SERPL-SCNC: 31 MMOL/L (ref 20–32)
CREAT SERPL-MCNC: 0.77 MG/DL (ref 0.52–1.04)
DIFFERENTIAL METHOD BLD: NORMAL
EOSINOPHIL # BLD AUTO: 0.3 10E9/L (ref 0–0.7)
EOSINOPHIL NFR BLD AUTO: 4.5 %
ERYTHROCYTE [DISTWIDTH] IN BLOOD BY AUTOMATED COUNT: 14.4 % (ref 10–15)
GFR SERPL CREATININE-BSD FRML MDRD: 75 ML/MIN/{1.73_M2}
GLUCOSE SERPL-MCNC: 86 MG/DL (ref 70–99)
HCT VFR BLD AUTO: 40.2 % (ref 35–47)
HDLC SERPL-MCNC: 63 MG/DL
HGB BLD-MCNC: 13.1 G/DL (ref 11.7–15.7)
LDLC SERPL CALC-MCNC: 106 MG/DL
LYMPHOCYTES # BLD AUTO: 1.8 10E9/L (ref 0.8–5.3)
LYMPHOCYTES NFR BLD AUTO: 29.6 %
MCH RBC QN AUTO: 29.6 PG (ref 26.5–33)
MCHC RBC AUTO-ENTMCNC: 32.6 G/DL (ref 31.5–36.5)
MCV RBC AUTO: 91 FL (ref 78–100)
MONOCYTES # BLD AUTO: 0.9 10E9/L (ref 0–1.3)
MONOCYTES NFR BLD AUTO: 14.3 %
NEUTROPHILS # BLD AUTO: 3.2 10E9/L (ref 1.6–8.3)
NEUTROPHILS NFR BLD AUTO: 50.6 %
NONHDLC SERPL-MCNC: 119 MG/DL
PLATELET # BLD AUTO: 203 10E9/L (ref 150–450)
POTASSIUM SERPL-SCNC: 4.2 MMOL/L (ref 3.4–5.3)
RBC # BLD AUTO: 4.42 10E12/L (ref 3.8–5.2)
SODIUM SERPL-SCNC: 140 MMOL/L (ref 133–144)
TRIGL SERPL-MCNC: 66 MG/DL
WBC # BLD AUTO: 6.2 10E9/L (ref 4–11)

## 2021-05-06 PROCEDURE — 99214 OFFICE O/P EST MOD 30 MIN: CPT | Performed by: NURSE PRACTITIONER

## 2021-05-06 PROCEDURE — 85025 COMPLETE CBC W/AUTO DIFF WBC: CPT | Performed by: NURSE PRACTITIONER

## 2021-05-06 PROCEDURE — 36415 COLL VENOUS BLD VENIPUNCTURE: CPT | Performed by: NURSE PRACTITIONER

## 2021-05-06 PROCEDURE — 82306 VITAMIN D 25 HYDROXY: CPT | Performed by: NURSE PRACTITIONER

## 2021-05-06 PROCEDURE — 80048 BASIC METABOLIC PNL TOTAL CA: CPT | Performed by: NURSE PRACTITIONER

## 2021-05-06 PROCEDURE — 80061 LIPID PANEL: CPT | Performed by: NURSE PRACTITIONER

## 2021-05-06 ASSESSMENT — MIFFLIN-ST. JEOR: SCORE: 1547.3

## 2021-05-06 ASSESSMENT — PATIENT HEALTH QUESTIONNAIRE - PHQ9
SUM OF ALL RESPONSES TO PHQ QUESTIONS 1-9: 0
SUM OF ALL RESPONSES TO PHQ QUESTIONS 1-9: 0
10. IF YOU CHECKED OFF ANY PROBLEMS, HOW DIFFICULT HAVE THESE PROBLEMS MADE IT FOR YOU TO DO YOUR WORK, TAKE CARE OF THINGS AT HOME, OR GET ALONG WITH OTHER PEOPLE: NOT DIFFICULT AT ALL

## 2021-05-06 ASSESSMENT — PAIN SCALES - GENERAL: PAINLEVEL: NO PAIN (0)

## 2021-05-06 NOTE — PATIENT INSTRUCTIONS
Patient Education     Osteoarthritis Medicine    Pain from osteoarthritis can interfere with your life in many ways. It can make it hard to be active and take good care of yourself. Untreated pain may make sleep difficult. It may also add to depression and anxiety.  Controlling pain involves lifestyle changes like weight management and exercise. Natural and alternative treatments for pain relief include the use of heat and cold, massage, acupuncture, relaxation, and counseling. Other medicines are available to help relieve pain.  Over-the-counter medicines  Some arthritis medicines can be bought without a prescription:    Acetaminophen is effective for moderate pain and does not cause stomach upset. It doesn t relieve swelling, though. You must talk with your healthcare provider before taking it if you have liver or kidney problems.     Nonsteroidal anti-inflammatory drugs (NSAIDs), such as ibuprofen or naproxen, help relieve pain and swelling. Use of NSAIDs can cause stomach and kidney problems and raise blood pressure. But don't take NSAIDs if you take medicines that thin your blood, such as warfarin. Talk with your healthcare provider first if you have kidney or liver disease.   Prescription medicines  Some arthritis medicines need a prescription:    Prescription NSAIDs are stronger than over-the-counter NSAIDs. They reduce pain and swelling. Use of NSAIDs may cause serious stomach problems and easy bruising. In rare cases they may lead to kidney or liver problems. These include nonselective NSAIDs, such as naproxen, diclofenac, and indomethacin. Also, selective NSAIDs, such as meloxicam and celecoxib. Selective NSAIDs are thought to have less of a risk of gastrointestinal side effects than nonselective NSAIDs.     Other medicines, such as duloxetine, tramadol, and opioid pain relievers, may be prescribed for certain people based on specific clinical factors.   Topical medicines  Topical medicines are those  applied directly to the skin over the affected joint. They may be lotions, cream, sprays, ointments, or gels. They can be used along with some medicines:    NSAID creams may reduce swelling and relieve pain.    Capsaicin cream is made from an ingredient found in chili peppers. It works by stopping production of a substance that helps send pain signals to the brain. It may cause a burning or stinging feeling when you first use it.    Other topical medicines provide pain relief by numbing the area to which they are applied.  Intra-articular medicines  Some people benefit from joint injections with:    Corticosteroids used for most joints    Hyaluronic acid preparation used for knees   If one medicine doesn't work for you, another may help. If you have any questions or concerns about your current medicines or other medicines choices, talk with your healthcare provider.  [employer] last reviewed this educational content on 6/1/2018 2000-2021 [employer]. All rights reserved. This information is not intended as a substitute for professional medical care. Always follow your healthcare professional's instructions.           Patient Education     Living with Osteoarthritis    Osteoarthritis is a chronic disease and the most common type of arthritis. But it doesn t have to keep you from leading an active life. You can help control symptoms by exercising and losing weight if you are overweight. Using special tools also helps make life easier. Be sure to see your healthcare provider for scheduled checkups and lab work. If you have questions or concerns between office visits, call your healthcare provider's office.  Make exercise part of your life  Gentle exercise can help lessen your pain. Keep the following in mind:    Choose exercises that improve joint motion and make your muscles stronger. Your healthcare provider or a physical therapist may suggest a few.    Stretching and flexibility activities such as  yoga and kulwinder chi may improve pain and joint motion.    Try low-impact sports, such as walking, biking, or doing exercises in a warm pool.    Most people should exercise for at least 30 minutes a day on most days of the week. This can be broken up into shorter periods throughout the day.    Don t push yourself too hard at first. Slowly build up over time.    Make sure you warm up for 5 to 10 minutes before you exercise.    If pain and stiffness increase, don't exercise as hard or as long.  Watch your weight  If you weigh more than you should, your weight-bearing joints are under extra pressure. This makes your symptoms worse. To reduce pain and stiffness, try losing a few of those extra pounds (kilograms). The tips below may help:    Start a weight-loss program with the help of your healthcare provider.    Ask your friends and family for support.    Join a weight-loss group.  Use special tools  Even simple tasks can be hard to do when your joints hurt. Special tools called assistive devices can make things easier by reducing strain and protecting your joints. Ask your healthcare provider where to find these and other helpful tools:    Long-handled reachers or grabbers    Jar openers and button threaders    Large  for pencils, garden tools, and other handheld objects  Use mobility and other aids  People with arthritis and other joint problems often use mobility aids to help with walking. For example, they may use canes or walkers. They may also use splints or braces to support joints. Talk with your healthcare provider or physical therapist about these aids:    A cane to reduce knee or hip pain and help prevent falls    Splints for your wrists or other joints    A brace to support a weak knee joint    Orthotics for toe and foot involvement  Medical and surgical treatments  Discuss medical treatments with your healthcare provider to help reduce your pain and improve joint mobility. Treatments may  include:    Topical medicines such as lidocaine, capsaicin, and diclofenac gel    Oral medicines such as acetaminophen, NSAIDs (nonsteroidal anti-inflammatory drugs) such as ibuprofen and naproxen, or opioids    Injections in affected joints such as corticosteroids in various joints, or hyaluronic acid in the knee joints    Surgical repair or surgical joint replacement with artificial joints    Complementary therapies such as heat and cold treatment, massage, acupuncture, supplements, cognitive training, meditation, and others. Discuss these options with your healthcare provider.  [employer] last reviewed this educational content on 6/1/2018 2000-2021 [employer]. All rights reserved. This information is not intended as a substitute for professional medical care. Always follow your healthcare professional's instructions.

## 2021-05-06 NOTE — LETTER
My Depression Action Plan  Name: Melania Alicea   Date of Birth 1945  Date: 5/6/2021    My doctor: Betty Arenas   My clinic: [facility]  [address] [place] [id number] [phone]          GREEN    ZONE   Good Control    What it looks like:     Things are going generally well. You have normal ups and downs. You may even feel depressed from time to time, but bad moods usually last less than a day.   What you need to do:  1. Continue to care for yourself (see self care plan)  2. Check your depression survival kit and update it as needed  3. Follow your physician s recommendations including any medication.  4. Do not stop taking medication unless you consult with your physician first.           YELLOW         ZONE Getting Worse    What it looks like:     Depression is starting to interfere with your life.     It may be hard to get out of bed; you may be starting to isolate yourself from others.    Symptoms of depression are starting to last most all day and this has happened for several days.     You may have suicidal thoughts but they are not constant.   What you need to do:     1. Call your care team. Your response to treatment will improve if you keep your care team informed of your progress. Yellow periods are signs an adjustment may need to be made.     2. Continue your self-care.  Just get dressed and ready for the day.  Don't give yourself time to talk yourself out of it.    3. Talk to someone in your support network.    4. Open up your Depression Self-Care Plan/Wellness Kit.           RED    ZONE Medical Alert - Get Help    What it looks like:     Depression is seriously interfering with your life.     You may experience these or other symptoms: You can t get out of bed most days, can t work or engage in other necessary activities, you have trouble taking care of basic hygiene, or basic responsibilities, thoughts of suicide or death that will  not go away, self-injurious behavior.     What you need to do:  1. Call your care team and request a same-day appointment. If they are not available (weekends or after hours) call your local crisis line, emergency room or 911.          Depression Self-Care Plan / Wellness Kit    Many people find that medication and therapy are helpful treatments for managing depression. In addition, making small changes to your everyday life can help to boost your mood and improve your wellbeing. Below are some tips for you to consider. Be sure to talk with your medical provider and/or behavioral health consultant if your symptoms are worsening or not improving.     Sleep   Sleep hygiene  means all of the habits that support good, restful sleep. It includes maintaining a consistent bedtime and wake time, using your bedroom only for sleeping or sex, and keeping the bedroom dark and free of distractions like a computer, smartphone, or television.     Develop a Healthy Routine  Maintain good hygiene. Get out of bed in the morning, make your bed, brush your teeth, take a shower, and get dressed. Don t spend too much time viewing media that makes you feel stressed. Find time to relax each day.    Exercise  Get some form of exercise every day. This will help reduce pain and release endorphins, the  feel good  chemicals in your brain. It can be as simple as just going for a walk or doing some gardening, anything that will get you moving.      Diet  Strive to eat healthy foods, including fruits and vegetables. Drink plenty of water. Avoid excessive sugar, caffeine, alcohol, and other mood-altering substances.     Stay Connected with Others  Stay in touch with friends and family members.    Manage Your Mood  Try deep breathing, massage therapy, biofeedback, or meditation. Take part in fun activities when you can. Try to find something to smile about each day.     Psychotherapy  Be open to working with a therapist if your provider recommends  it.     Medication  Be sure to take your medication as prescribed. Most anti-depressants need to be taken every day. It usually takes several weeks for medications to work. Not all medicines work for all people. It is important to follow-up with your provider to make sure you have a treatment plan that is working for you. Do not stop your medication abruptly without first discussing it with your provider.    Crisis Resources   These hotlines are for both adults and children. They and are open 24 hours a day, 7 days a week unless noted otherwise.      National Suicide Prevention Lifeline   0-210-409-TALK (1595)      Crisis Text Line    www.crisistextline.org  Text HOME to 895792 from anywhere in the [place], anytime, about any type of crisis. A live, trained crisis counselor will receive the text and respond quickly.      [place] Lifeline for LGBTQ Youth  A national crisis intervention and suicide lifeline for LGBTQ youth under 25. Provides a safe place to talk without judgement. Call 1-865.924.3478; text START to 882663 or visit www.thetrevorproject.org to talk to a trained counselor.      For [facility] crisis numbers, visit the [facility] website at:  https://[place].gov/dhs/people-we-serve/adults/health-care/mental-health/resources/crisis-contacts.jsp

## 2021-05-06 NOTE — PROGRESS NOTES
Answers for HPI/ROS submitted by the patient on 5/6/2021   If you checked off any problems, how difficult have these problems made it for you to do your work, take care of things at home, or get along with other people?: Not difficult at all  PHQ9 TOTAL SCORE: 0      Assessment & Plan     Primary osteoarthritis of both hands  Labs today  - CBC with platelets and differential  - Basic metabolic panel  (Ca, Cl, CO2, Creat, Gluc, K, Na, BUN)    Anxiety  Severe episode of recurrent major depressive disorder, without psychotic features (H)  Improved   Continue wellbutrin daily  Use support systems  - CBC with platelets and differential  - Basic metabolic panel  (Ca, Cl, CO2, Creat, Gluc, K, Na, BUN)    Vitamin D deficiency  Continue daily vitamin D3  - Vitamin D Deficiency    Need for hepatitis C screening test  Declined     Screening for hyperlipidemia  Labs today  Continue low fat diet  Statin use in the past exacerbated her LE weakness and pain  - Lipid panel reflex to direct LDL Fasting    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         Call or return to the clinic with any worsening of symptoms or no resolution. Patient/Parent verbalized understanding and is in agreement. Medication side effects reviewed.   Current Outpatient Medications   Medication Sig Dispense Refill     ADVIL 200 MG PO CAPS ONE TO TWO TABLETS EVERY 6-8 HOURS AS NEEDED  0     buPROPion (WELLBUTRIN XL) 300 MG 24 hr tablet TAKE 1 TABLET BY MOUTH EVERY DAY IN THE MORNING 90 tablet 0     VITAMIN D, CHOLECALCIFEROL, PO Take 1,000 Units by mouth daily       estradiol (ESTRACE) 0.5 MG tablet Place vaginally twice a week 24 tablet 3     Chart documentation with Dragon Voice recognition Software. Although reviewed after completion, some words and grammatical errors may  remain.      Return in about 6 months (around 11/6/2021).    JOANN Dorsey CNP  [facility]    Michelle Velázquez is a 75 year old who presents for the following health issues     HPI     Abnormal Mood Symptoms  Onset/Duration: couple of years.   Description: improved after financial and housing issues improved   Depression (if yes, do PHQ-9): YES  Anxiety (if yes, do BOB-7): YES  Accompanying Signs & Symptoms:  Still participating in activities that you used to enjoy: YES  Fatigue: YES  Irritability: no  Difficulty concentrating: no  Changes in appetite: no  Problems with sleep: YES  Heart racing/beating fast: no  Abnormally elevated, expansive, or irritable mood: no  Persistently increased activity or energy: no  Thoughts of hurting yourself or others: no  History:  Recent stress or major life event: YES  Prior depression or anxiety: ongoing but improving   Family history of depression or anxiety: no  Alcohol/drug use: no  Difficulty sleeping: YES  Precipitating or alleviating factors: housing issues   Therapies tried and outcome: medications helpful  PHQ 1/30/2020 7/23/2020 5/6/2021   PHQ-9 Total Score 9 0 0   Q9: Thoughts of better off dead/self-harm past 2 weeks Not at all Not at all Not at all   F/U: Thoughts of suicide or self-harm - - -   F/U: Safety concerns - - -     BOB-7 SCORE 12/20/2019 1/30/2020 7/23/2020   Total Score 10 9 0     Musculoskeletal problem/pain  Onset/Duration: years  Description  Location: knee and hand - bilateral  Joint Swelling: YES  Redness: no  Pain: YES  Warmth: YES  Intensity:  moderate  Progression of Symptoms:  intermittent  Accompanying signs and symptoms:   Fevers: no  Numbness/tingling/weakness: YES  History  Trauma to the area: no  Recent illness:  no  Previous similar problem: YES  Previous evaluation:  YES  Precipitating or alleviating factors:  Aggravating factors include: standing, walking, climbing stairs and cold or damp  "weather  Therapies tried and outcome: rest/inactivity, heat, ice, immobilization, exercises, acetaminophen and deep heating rub    Skin Lesion  Onset/Duration: LE bilaterally  Description  Location: legs bilaterally  Color: brown  Border description: red, inflamed  Character: blanches to palpitation  Itching: moderate  Bleeding:  no  Intensity:  moderate  Progression of Symptoms:  intermittent  Accompanying signs and symptoms:   Bleeding: YES  Scaling: no  Excessive sun exposure/tanning: no  Sunscreen used: YES  History:           Any previous history of skin cancer: no  Any family history of melanoma: no  Previous episodes of similar lesion: YES  Precipitating or alleviating factors: LE compression   Therapies tried and outcome: topical steroid -  and oral antibiotics when needed for open lesions and cellulitis      has a past medical history of Arthritis (1990), 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      Review of Systems   Constitutional, HEENT, cardiovascular, pulmonary, GI, , musculoskeletal, neuro, skin, endocrine and psych systems are negative, except as otherwise noted.      Objective    /78   Pulse 74   Temp 97.3  F (36.3  C) (Tympanic)   Ht 1.702 m (5' 7\")   Wt 103 kg (227 lb)   SpO2 98%   BMI 35.55 kg/m    Body mass index is 35.55 kg/m .  Physical Exam   GENERAL: alert, obese and elderly  EYES: Eyes grossly normal to inspection, PERRL and conjunctivae and sclerae normal  HENT: ear canals and TM's normal, nose and mouth without ulcers or lesions  NECK: no adenopathy, no asymmetry, masses, or scars and thyroid normal to palpation  RESP: lungs " clear to auscultation - no rales, rhonchi or wheezes  CV: regular rate and rhythm, normal S1 S2, no S3 or S4, no murmur, click or rub, no peripheral edema and peripheral pulses strong  ABDOMEN: soft, nontender, no hepatosplenomegaly, no masses and bowel sounds normal  MS: no gross musculoskeletal defects noted, no edema  SKIN: hyperpigmentation - lower legs  NEURO: weakness of LE, decreased tone LE, sensory exam grossly normal, mentation intact and gait abnormal: ambulates with cane and walker   PSYCH: mentation appears normal, affect normal/bright    Office Visit on 06/25/2019   Component Date Value Ref Range Status     Vitamin B6 06/25/2019 48.1  20.0 - 125.0 nmol/L Final    Comment: (Note)  INTERPRETIVE INFORMATION: Vitamin B6 (Pyridoxal 5-Phosphate)  Pyridoxal 5'-phosphate measured in a specimen collected   following an 8-hour or overnight fast accurately indicates   vitamin B6 nutritional status. Non-fasting specimen   concentration reflects recent vitamin intake.  Test developed and characteristics determined by [facility]. See Compliance Statement B: [employer]  Performed by [facility],  [address] [place] [id number] [phone]  [email or web address], Nico Castillo MD, Lab. Director       Vitamin B12 06/25/2019 207  193 - 986 pg/mL Final     Vitamin D Deficiency screening 06/25/2019 26  20 - 75 ug/L Final    Comment: Season, race, dietary intake, and treatment affect the concentration of   25-hydroxy-Vitamin D. Values may decrease during winter months and increase   during summer months. Values 20-29 ug/L may indicate Vitamin D insufficiency   and values <20 ug/L may indicate Vitamin D deficiency.  Vitamin D determination is routinely performed by an immunoassay specific for   25 hydroxyvitamin D3.  If an individual is on vitamin D2 (ergocalciferol)   supplementation, please specify 25 OH vitamin D2 and D3 level determination by   LCMSMS test [id number].           Results for orders placed or  performed in visit on 05/06/21   Lipid panel reflex to direct LDL Fasting     Status: Abnormal   Result Value Ref Range    Cholesterol 182 <200 mg/dL    Triglycerides 66 <150 mg/dL    HDL Cholesterol 63 >49 mg/dL    LDL Cholesterol Calculated 106 (H) <100 mg/dL    Non HDL Cholesterol 119 <130 mg/dL   CBC with platelets and differential     Status: None   Result Value Ref Range    WBC 6.2 4.0 - 11.0 10e9/L    RBC Count 4.42 3.8 - 5.2 10e12/L    Hemoglobin 13.1 11.7 - 15.7 g/dL    Hematocrit 40.2 35.0 - 47.0 %    MCV 91 78 - 100 fl    MCH 29.6 26.5 - 33.0 pg    MCHC 32.6 31.5 - 36.5 g/dL    RDW 14.4 10.0 - 15.0 %    Platelet Count 203 150 - 450 10e9/L    % Neutrophils 50.6 %    % Lymphocytes 29.6 %    % Monocytes 14.3 %    % Eosinophils 4.5 %    % Basophils 1.0 %    Absolute Neutrophil 3.2 1.6 - 8.3 10e9/L    Absolute Lymphocytes 1.8 0.8 - 5.3 10e9/L    Absolute Monocytes 0.9 0.0 - 1.3 10e9/L    Absolute Eosinophils 0.3 0.0 - 0.7 10e9/L    Absolute Basophils 0.1 0.0 - 0.2 10e9/L    Diff Method Automated Method    Basic metabolic panel  (Ca, Cl, CO2, Creat, Gluc, K, Na, BUN)     Status: Abnormal   Result Value Ref Range    Sodium 140 133 - 144 mmol/L    Potassium 4.2 3.4 - 5.3 mmol/L    Chloride 107 94 - 109 mmol/L    Carbon Dioxide 31 20 - 32 mmol/L    Anion Gap 2 (L) 3 - 14 mmol/L    Glucose 86 70 - 99 mg/dL    Urea Nitrogen 16 7 - 30 mg/dL    Creatinine 0.77 0.52 - 1.04 mg/dL    GFR Estimate 75 >60 mL/min/[1.73_m2]    GFR Estimate If Black 86 >60 mL/min/[1.73_m2]    Calcium 9.3 8.5 - 10.1 mg/dL   Vitamin D Deficiency     Status: None   Result Value Ref Range    Vitamin D Deficiency screening 33 20 - 75 ug/L

## 2021-05-07 LAB — DEPRECATED CALCIDIOL+CALCIFEROL SERPL-MC: 33 UG/L (ref 20–75)

## 2021-05-25 DIAGNOSIS — Z46.89 PESSARY MAINTENANCE: ICD-10-CM

## 2021-05-25 RX ORDER — ESTRADIOL 0.5 MG/1
TABLET ORAL
Qty: 24 TABLET | Refills: 3 | Status: SHIPPED | OUTPATIENT
Start: 2021-05-25 | End: 2022-05-02

## 2021-05-25 NOTE — TELEPHONE ENCOUNTER
Estradiol 0.5  Last Written Prescription Date:  6/19/2020  Last Fill Quantity: 24,  # refills: 3   Last office visit: 3/2/2021 with prescribing provider:  Willam Powell Office Visit:

## 2021-05-25 NOTE — TELEPHONE ENCOUNTER
"Requested Prescriptions   Pending Prescriptions Disp Refills     estradiol (ESTRACE) 0.5 MG tablet 24 tablet 3     Sig: Place vaginally twice a week       Hormone Replacement Therapy Passed - 5/25/2021  3:33 PM        Passed - Blood pressure under 140/90 in past 12 months     BP Readings from Last 3 Encounters:   05/06/21 126/78   03/02/21 117/83   10/21/20 123/62                 Passed - Recent (12 mo) or future (30 days) visit within the authorizing provider's specialty     Patient has had an office visit with the authorizing provider or a provider within the authorizing providers department within the previous 12 mos or has a future within next 30 days. See \"Patient Info\" tab in inbasket, or \"Choose Columns\" in Meds & Orders section of the refill encounter.              Passed - Medication is active on med list        Passed - Patient is 18 years of age or older        Passed - No active pregnancy on record        Passed - No positive pregnancy test on record in past 12 months           Prescription approved per [facility] Refill Protocol.  And sent to the pharmacy. Vanessa Irizarry RN on 5/25/2021 at 3:33 PM   "

## 2021-06-24 ENCOUNTER — OFFICE VISIT (OUTPATIENT)
Dept: OBGYN | Facility: CLINIC | Age: 76
End: 2021-06-24
Payer: COMMERCIAL

## 2021-06-24 VITALS
HEART RATE: 74 BPM | SYSTOLIC BLOOD PRESSURE: 132 MMHG | HEIGHT: 67 IN | DIASTOLIC BLOOD PRESSURE: 64 MMHG | BODY MASS INDEX: 35.63 KG/M2 | WEIGHT: 227 LBS | RESPIRATION RATE: 18 BRPM

## 2021-06-24 DIAGNOSIS — Z46.89 PESSARY MAINTENANCE: Primary | ICD-10-CM

## 2021-06-24 PROCEDURE — 99213 OFFICE O/P EST LOW 20 MIN: CPT | Performed by: OBSTETRICS & GYNECOLOGY

## 2021-06-24 ASSESSMENT — MIFFLIN-ST. JEOR: SCORE: 1547.3

## 2021-06-24 NOTE — NURSING NOTE
"Initial /64 (BP Location: Left arm, Patient Position: Chair, Cuff Size: Adult Large)   Pulse 74   Resp 18   Ht 1.702 m (5' 7\")   Wt 103 kg (227 lb)   Breastfeeding No   BMI 35.55 kg/m   Estimated body mass index is 35.55 kg/m  as calculated from the following:    Height as of this encounter: 1.702 m (5' 7\").    Weight as of this encounter: 103 kg (227 lb). .      "

## 2021-06-24 NOTE — PROGRESS NOTES
Melania is a 75 year old   female who presents for pessary recheck;  she currently has a gelhorn pessary which she keeps in place continously;  she reports no problems with her pessary, no discharge, no bleeding.. She is using vaginal estrogen twice a week.    All systems were reviewed and pertinent information in noted in subjective/HPI.    Past Medical History:   Diagnosis Date     Arthritis      Charcot Emilee Tooth muscular atrophy 2010    Symptoms began at age 48. Difficulty with stairs, getting up from chairs, weakness in LE's but more so in the left.       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 Cunningham, [place], Right     COLONOSCOPY  2010       Current Outpatient Medications   Medication Sig Dispense Refill     ADVIL 200 MG PO CAPS ONE TO TWO TABLETS EVERY 6-8 HOURS AS NEEDED  0     buPROPion (WELLBUTRIN XL) 300 MG 24 hr tablet TAKE 1 TABLET BY MOUTH EVERY DAY IN THE MORNING 90 tablet 0     estradiol (ESTRACE) 0.5 MG tablet Place vaginally twice a week 24 tablet 3     VITAMIN D, CHOLECALCIFEROL, PO Take 1,000 Units by mouth daily         Social History     Socioeconomic History     Marital status: Single     Spouse name: None     Number of children: None     Years of education: None     Highest education level: None   Occupational History     None   Social Needs     Financial resource strain: None     Food insecurity     Worry: None     Inability: None     Transportation  "needs     Medical: None     Non-medical: None   Tobacco Use     Smoking status: Never Smoker     Smokeless tobacco: Never Used   Substance and Sexual Activity     Alcohol use: No     Drug use: No     Sexual activity: Never   Lifestyle     Physical activity     Days per week: None     Minutes per session: None     Stress: None   Relationships     Social connections     Talks on phone: None     Gets together: None     Attends Mu-ism service: None     Active member of club or organization: None     Attends meetings of clubs or organizations: None     Relationship status: None     Intimate partner violence     Fear of current or ex partner: None     Emotionally abused: None     Physically abused: None     Forced sexual activity: None   Other Topics Concern     Parent/sibling w/ CABG, MI or angioplasty before 65F 55M? No   Social History Narrative     None       Family History   Problem Relation Age of Onset     Hypertension Mother      Musculoskeletal Disorder Mother         CMT- Charcot Emilee Tooth     Cerebrovascular Disease Father      Respiratory Brother         Emphysema     Musculoskeletal Disorder Daughter         CMT- Charcot Emilee Tooth     Other Cancer Brother         Bone and Bladder       OBJECTIVE: /64 (BP Location: Left arm, Patient Position: Chair, Cuff Size: Adult Large)   Pulse 74   Resp 18   Ht 1.702 m (5' 7\")   Wt 103 kg (227 lb)   Breastfeeding No   BMI 35.55 kg/m    No LMP recorded. Patient is postmenopausal.  The pessary was removed without difficulty, cleaned in warm water and then replaced, after inspection of the vulva and vagina, which showed no erosions, minimal leukorrhea.    ASSESSMENT:     ICD-10-CM    1. Pessary maintenance  Z46.89          PLAN: RETURN TO CLINIC every 3-4 months for pessary cleaning and replacement  Continue twice a week vaginal  estradiol    Lisa Qualres MD      "

## 2021-07-28 DIAGNOSIS — F41.9 ANXIETY: ICD-10-CM

## 2021-07-28 DIAGNOSIS — F33.2 SEVERE EPISODE OF RECURRENT MAJOR DEPRESSIVE DISORDER, WITHOUT PSYCHOTIC FEATURES (H): ICD-10-CM

## 2021-07-28 RX ORDER — BUPROPION HYDROCHLORIDE 300 MG/1
TABLET ORAL
Qty: 90 TABLET | Refills: 1 | Status: SHIPPED | OUTPATIENT
Start: 2021-07-28 | End: 2022-01-24

## 2021-08-26 NOTE — MR AVS SNAPSHOT
After Visit Summary   1/18/2018    Melania Alicea    MRN: [id number]           Patient Information     Date Of Birth          1945        Visit Information        Provider Department      1/18/2018 1:40 PM Betty Arenas APRN [facility]        Today's Diagnoses     Acute sinusitis with symptoms > 10 days    -  1      Care Instructions      Sinusitis (Antibiotic Treatment)    The sinuses are air-filled spaces within the bones of the face. They connect to the inside of the nose. Sinusitis is an inflammation of the tissue lining the sinus cavity. Sinus inflammation can occur during a cold. It can also be due to allergies to pollens and other particles in the air. Sinusitis can cause symptoms of sinus congestion and fullness. A sinus infection causes fever, headache and facial pain. There is often green or yellow drainage from the nose or into the back of the throat (post-nasal drip). You have been given antibiotics to treat this condition.  Home care:    Take the full course of antibiotics as instructed. Do not stop taking them, even if you feel better.    Drink plenty of water, hot tea, and other liquids. This may help thin mucus. It also may promote sinus drainage.    Heat may help soothe painful areas of the face. Use a towel soaked in hot water. Or,  the shower and direct the hot spray onto your face. Using a vaporizer along with a menthol rub at night may also help.     An expectorant containing guaifenesin may help thin the mucus and promote drainage from the sinuses.    Over-the-counter decongestants may be used unless a similar medicine was prescribed. Nasal sprays work the fastest. Use one that contains phenylephrine or oxymetazoline. First blow the nose gently. Then use the spray. Do not use these medicines more often than directed on the label or symptoms may get worse. You may also use tablets containing pseudoephedrine. Avoid products that combine  ingredients, because side effects may be increased. Read labels. You can also ask the pharmacist for help. (NOTE: Persons with high blood pressure should not use decongestants. They can raise blood pressure.)    Over-the-counter antihistamines may help if allergies contributed to your sinusitis.      Do not use nasal rinses or irrigation during an acute sinus infection, unless told to by your health care provider. Rinsing may spread the infection to other sinuses.    Use acetaminophen or ibuprofen to control pain, unless another pain medicine was prescribed. (If you have chronic liver or kidney disease or ever had a stomach ulcer, talk with your doctor before using these medicines. Aspirin should never be used in anyone under 18 years of age who is ill with a fever. It may cause severe liver damage.)    Don't smoke. This can worsen symptoms.  Follow-up care  Follow up with your healthcare provider or our staff if you are not improving within the next week.  When to seek medical advice  Call your healthcare provider if any of these occur:    Facial pain or headache becoming more severe    Stiff neck    Unusual drowsiness or confusion    Swelling of the forehead or eyelids    Vision problems, including blurred or double vision    Fever of 100.4 F (38 C) or higher, or as directed by your healthcare provider    Seizure    Breathing problems    Symptoms not resolving within 10 days  Date Last Reviewed: 4/13/2015 2000-2017 [employer]. [address] [place] [address]. All rights reserved. This information is not intended as a substitute for professional medical care. Always follow your healthcare professional's instructions.                Follow-ups after your visit        Who to contact     If you have questions or need follow up information about today's clinic visit or your schedule please contact [facility] directly at [phone].  Normal or non-critical lab and imaging  results will be communicated to you by [employer], letter or phone within 4 business days after the clinic has received the results. If you do not hear from us within 7 days, please contact the clinic through [employer] or phone. If you have a critical or abnormal lab result, we will notify you by phone as soon as possible.  Submit refill requests through [employer] or call your pharmacy and they will forward the refill request to us. Please allow 3 business days for your refill to be completed.          Additional Information About Your Visit        [employer] Information     [employer] gives you secure access to your electronic health record. If you see a primary care provider, you can also send messages to your care team and make appointments. If you have questions, please call your primary care clinic.  If you do not have a primary care provider, please call [phone] and they will assist you.        Care EveryWhere ID     This is your Care EveryWhere ID. This could be used by other organizations to access your [place] medical records  [id number]        Your Vitals Were     Pulse Temperature Respirations Pulse Oximetry BMI (Body Mass Index)       62 97.3  F (36.3  C) (Tympanic) 14 99% 43.21 kg/m2        Blood Pressure from Last 3 Encounters:   01/18/18 120/62   01/04/18 124/74   10/18/17 146/74    Weight from Last 3 Encounters:   01/18/18 280 lb (127 kg)   01/04/18 280 lb (127 kg)   10/18/17 278 lb (126.1 kg)              Today, you had the following     No orders found for display         Today's Medication Changes          These changes are accurate as of: 1/18/18  2:13 PM.  If you have any questions, ask your nurse or doctor.               Start taking these medicines.        Dose/Directions    amoxicillin-clavulanate 875-125 MG per tablet   Commonly known as:  AUGMENTIN   Used for:  Acute sinusitis with symptoms > 10 days   Started by:  Betty Arenas APRN CNP        Dose:  1 tablet   Take 1 tablet by mouth  2 times daily   Quantity:  28 tablet   Refills:  0         Stop taking these medicines if you haven't already. Please contact your care team if you have questions.     cephALEXin 500 MG capsule   Commonly known as:  KEFLEX   Stopped by:  Betty Arenas APRN CNP                Where to get your medicines      These medications were sent to [facility] - [place], [place] - [address]  [address] [place] [address]     Phone:  [phone]     amoxicillin-clavulanate 875-125 MG per tablet                Primary Care Provider Office Phone # Fax #    JOANN Dorsey [phone]       [address] [place] [address]        Equal Access to Services     [facility]: Hadii rashad barton hadasho Soomaali, waaxda luqadaha, qaybta kaalmada adeegyada, kyle ling . So [facility] [phone].    ATENCIÓN: Si habla español, tiene a ochoa disposición servicios gratuitos de asistencia lingüística. Llame al [phone].    We comply with applicable federal civil rights laws and [place] laws. We do not discriminate on the basis of race, color, national origin, age, disability, sex, sexual orientation, or gender identity.            Thank you!     Thank you for choosing [facility]  for your care. Our goal is always to provide you with excellent care. Hearing back from our patients is one way we can continue to improve our services. Please take a few minutes to complete the written survey that you may receive in the mail after your visit with us. Thank you!             Your Updated Medication List - Protect others around you: Learn how to safely use, store and throw away your medicines at www.disposemymeds.org.          This list is accurate as of: 1/18/18  2:13 PM.  Always use your most recent med list.                   Brand Name Dispense Instructions for use Diagnosis    ADVIL 200 MG capsule   Generic drug:  ibuprofen      ONE TO TWO TABLETS EVERY 6-8 HOURS  AS NEEDED        amoxicillin-clavulanate 875-125 MG per tablet    AUGMENTIN    28 tablet    Take 1 tablet by mouth 2 times daily    Acute sinusitis with symptoms > 10 days       augmented betamethasone dipropionate 0.05 % cream    DIPROLENE-AF    50 g    Apply topically 2 times daily    Venous stasis dermatitis of right lower extremity       estradiol 0.5 MG tablet    ESTRACE    24 tablet    Place vaginally twice a week    Pessary maintenance       VITAMIN D (CHOLECALCIFEROL) PO      Take 1,000 Units by mouth daily           [address] [facility]

## 2021-09-04 ENCOUNTER — HEALTH MAINTENANCE LETTER (OUTPATIENT)
Age: 76
End: 2021-09-04

## 2021-10-27 ENCOUNTER — OFFICE VISIT (OUTPATIENT)
Dept: OBGYN | Facility: CLINIC | Age: 76
End: 2021-10-27
Payer: COMMERCIAL

## 2021-10-27 VITALS
TEMPERATURE: 99.6 F | HEIGHT: 67 IN | DIASTOLIC BLOOD PRESSURE: 75 MMHG | HEART RATE: 80 BPM | BODY MASS INDEX: 37.83 KG/M2 | SYSTOLIC BLOOD PRESSURE: 140 MMHG | RESPIRATION RATE: 18 BRPM | WEIGHT: 241 LBS

## 2021-10-27 DIAGNOSIS — Z46.89 PESSARY MAINTENANCE: Primary | ICD-10-CM

## 2021-10-27 PROCEDURE — 99213 OFFICE O/P EST LOW 20 MIN: CPT | Performed by: OBSTETRICS & GYNECOLOGY

## 2021-10-27 ASSESSMENT — MIFFLIN-ST. JEOR: SCORE: 1605.8

## 2021-10-27 NOTE — NURSING NOTE
"Initial BP (!) 140/75 (BP Location: Left arm, Patient Position: Chair, Cuff Size: Adult Large)   Pulse 80   Temp 99.6  F (37.6  C) (Tympanic)   Resp 18   Ht 1.702 m (5' 7\")   Wt 109.3 kg (241 lb)   Breastfeeding No   BMI 37.75 kg/m   Estimated body mass index is 37.75 kg/m  as calculated from the following:    Height as of this encounter: 1.702 m (5' 7\").    Weight as of this encounter: 109.3 kg (241 lb). .      "

## 2021-10-27 NOTE — PROGRESS NOTES
Melania is a 76 year old   female who presents for pessary recheck;  she currently has a Gelhorn pessary which she keeps in place continuously;  she reports no problems with her pessary, no discharge, no bleeding.. She is using vaginal estrogen twiced a week.    All systems were reviewed and pertinent information in noted in subjective/HPI.    Past Medical History:   Diagnosis Date     Arthritis      Charcot Emilee Tooth muscular atrophy 2010    Symptoms began at age 48. Difficulty with stairs, getting up from chairs, weakness in LE's but more so in the left.       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 Cunningham, [place], Right     COLONOSCOPY  2010       Current Outpatient Medications   Medication Sig Dispense Refill     ADVIL 200 MG PO CAPS ONE TO TWO TABLETS EVERY 6-8 HOURS AS NEEDED  0     buPROPion (WELLBUTRIN XL) 300 MG 24 hr tablet TAKE 1 TABLET BY MOUTH EVERY DAY IN THE MORNING 90 tablet 1     estradiol (ESTRACE) 0.5 MG tablet Place vaginally twice a week 24 tablet 3     VITAMIN D, CHOLECALCIFEROL, PO Take 1,000 Units by mouth daily         Social History     Socioeconomic History     Marital status: Single     Spouse name: None     Number of children: None     Years of education: None     Highest education level: None   Occupational History     None   Tobacco Use     Smoking status: Never Smoker     Smokeless tobacco: Never Used   Vaping Use     Vaping Use: Never used  "  Substance and Sexual Activity     Alcohol use: No     Drug use: No     Sexual activity: Never   Other Topics Concern     Parent/sibling w/ CABG, MI or angioplasty before 65F 55M? No   Social History Narrative     None     Social Determinants of Health     Financial Resource Strain:      Difficulty of Paying Living Expenses:    Food Insecurity:      Worried About Running Out of Food in the Last Year:      Ran Out of Food in the Last Year:    Transportation Needs:      Lack of Transportation (Medical):      Lack of Transportation (Non-Medical):    Physical Activity:      Days of Exercise per Week:      Minutes of Exercise per Session:    Stress:      Feeling of Stress :    Social Connections:      Frequency of Communication with Friends and Family:      Frequency of Social Gatherings with Friends and Family:      Attends Shinto Services:      Active Member of Clubs or Organizations:      Attends Club or Organization Meetings:      Marital Status:    Intimate Partner Violence:      Fear of Current or Ex-Partner:      Emotionally Abused:      Physically Abused:      Sexually Abused:        Family History   Problem Relation Age of Onset     Hypertension Mother      Musculoskeletal Disorder Mother         CMT- Charcot Emilee Tooth     Cerebrovascular Disease Father      Respiratory Brother         Emphysema     Musculoskeletal Disorder Daughter         CMT- Charcot Emilee Tooth     Other Cancer Brother         Bone and Bladder       OBJECTIVE: BP (!) 140/75 (BP Location: Left arm, Patient Position: Chair, Cuff Size: Adult Large)   Pulse 80   Temp 99.6  F (37.6  C) (Tympanic)   Resp 18   Ht 1.702 m (5' 7\")   Wt 109.3 kg (241 lb)   Breastfeeding No   BMI 37.75 kg/m    No LMP recorded. Patient is postmenopausal.  The pessary was removed without difficulty, cleaned in warm water and then replaced, after inspection of the vulva and vagina, which showed no erosions.    ASSESSMENT:     ICD-10-CM    1. Pessary " maintenance  Z46.89          PLAN: RETURN TO CLINIC 4 months for pessary hygiene; continue twice a week estradiol therapy    Lisa Quarles MD

## 2021-12-07 ENCOUNTER — OFFICE VISIT (OUTPATIENT)
Dept: FAMILY MEDICINE | Facility: CLINIC | Age: 76
End: 2021-12-07
Payer: COMMERCIAL

## 2021-12-07 VITALS
WEIGHT: 242 LBS | TEMPERATURE: 97.3 F | OXYGEN SATURATION: 97 % | HEART RATE: 80 BPM | BODY MASS INDEX: 37.9 KG/M2 | SYSTOLIC BLOOD PRESSURE: 138 MMHG | DIASTOLIC BLOOD PRESSURE: 82 MMHG | RESPIRATION RATE: 14 BRPM

## 2021-12-07 DIAGNOSIS — I87.2 VENOUS STASIS DERMATITIS OF LEFT LOWER EXTREMITY: ICD-10-CM

## 2021-12-07 DIAGNOSIS — H25.89 OTHER AGE-RELATED CATARACT OF BOTH EYES: Primary | ICD-10-CM

## 2021-12-07 PROCEDURE — 99214 OFFICE O/P EST MOD 30 MIN: CPT | Performed by: NURSE PRACTITIONER

## 2021-12-07 RX ORDER — TRIAMCINOLONE ACETONIDE 1 MG/G
CREAM TOPICAL 2 TIMES DAILY
Qty: 80 G | Refills: 2 | Status: SHIPPED | OUTPATIENT
Start: 2021-12-07 | End: 2022-06-01

## 2021-12-07 RX ORDER — MUPIROCIN 20 MG/G
OINTMENT TOPICAL 3 TIMES DAILY
Qty: 30 G | Refills: 0 | Status: SHIPPED | OUTPATIENT
Start: 2021-12-07 | End: 2022-06-01

## 2021-12-07 RX ORDER — CEPHALEXIN 500 MG/1
500 CAPSULE ORAL 3 TIMES DAILY
Qty: 30 CAPSULE | Refills: 0 | Status: SHIPPED | OUTPATIENT
Start: 2021-12-07 | End: 2022-06-01

## 2021-12-07 ASSESSMENT — PAIN SCALES - GENERAL: PAINLEVEL: NO PAIN (0)

## 2021-12-07 NOTE — PROGRESS NOTES
[facility]  [address] [place] [address]  Phone: [phone]  Fax: [phone]  Primary Provider: Juan Arenas  Pre-op Performing Provider: JUAN [id number]}  PREOPERATIVE EVALUATION:  Today's date: 12/7/2021    Melania Alicea is a 76 year old female who presents for a preoperative evaluation.    Surgical Information:  Surgery/Procedure: Cataracts   Surgery Location: [facility]   Surgeon:   Surgery Date: 01/10/2021  Time of Surgery: tbd   Where patient plans to recover: At home with family  Fax number for surgical facility: [phone]    Type of Anesthesia Anticipated: Local and to be determined    Assessment & Plan     The proposed surgical procedure is considered LOW risk.    Other age-related cataract of both eyes  Surgery as planned    Venous stasis dermatitis of left lower extremity  Left lower extremity exacerbation today  Recommend compression stockings on in the morning off in the evening  Begin Keflex  - cephALEXin (KEFLEX) 500 MG capsule  Dispense: 30 capsule; Refill: 0  And topical  - mupirocin (BACTROBAN) 2 % external ointment  Dispense: 30 g; Refill: 0  After open sores are healed may use triamcinolone for itching  - triamcinolone (KENALOG) 0.1 % external cream  Dispense: 80 g; Refill: 2      Risks and Recommendations:  The patient has the following additional risks and recommendations for perioperative complications:   - Morbid obesity (BMI >40)    Medication Instructions:  Patient is to take all scheduled medications on the day of surgery    RECOMMENDATION:  APPROVAL GIVEN to proceed with proposed procedure, without further diagnostic evaluation.        Call or return to the clinic with any worsening of symptoms or no resolution. Patient/Parent verbalized understanding and is in agreement. Medication side effects reviewed.   Current Outpatient Medications   Medication Sig Dispense Refill     cephALEXin (KEFLEX) 500 MG capsule Take 1  capsule (500 mg) by mouth 3 times daily for 10 days 30 capsule 0     mupirocin (BACTROBAN) 2 % external ointment Apply topically 3 times daily For open sores 30 g 0     triamcinolone (KENALOG) 0.1 % external cream Apply topically 2 times daily For severe itching 80 g 2     ADVIL 200 MG PO CAPS ONE TO TWO TABLETS EVERY 6-8 HOURS AS NEEDED  0     buPROPion (WELLBUTRIN XL) 300 MG 24 hr tablet TAKE 1 TABLET BY MOUTH EVERY DAY IN THE MORNING 90 tablet 1     estradiol (ESTRACE) 0.5 MG tablet Place vaginally twice a week 24 tablet 3     VITAMIN D, CHOLECALCIFEROL, PO Take 1,000 Units by mouth daily       Chart documentation with Dragon Voice recognition Software. Although reviewed after completion, some words and grammatical errors may remain.  Betty Arenas MSN,FNP-[address] [address]  [phone]        6}    Subjective     HPI related to upcoming procedure: vision changes for over a year      Preop Questions 12/4/2021   1. Have you ever had a heart attack or stroke? No   2. Have you ever had surgery on your heart or blood vessels, such as a stent placement, a coronary artery bypass, or surgery on an artery in your head, neck, heart, or legs? No   3. Do you have chest pain with activity? No   4. Do you have a history of  heart failure? No   5. Do you currently have a cold, bronchitis or symptoms of other infection? No   6. Do you have a cough, shortness of breath, or wheezing? No   7. Do you or anyone in your family have previous history of blood clots? No   8. Do you or does anyone in your family have a serious bleeding problem such as prolonged bleeding following surgeries or cuts? No   9. Have you ever had problems with anemia or been told to take iron pills? No   10. Have you had any abnormal blood loss such as black, tarry or bloody stools, or abnormal vaginal bleeding? No   11. Have you ever had a blood transfusion? No   12. Are you willing to  have a blood transfusion if it is medically needed before, during, or after your surgery? Yes   13. Have you or any of your relatives ever had problems with anesthesia? No   14. Do you have sleep apnea, excessive snoring or daytime drowsiness? No   15. Do you have any artifical heart valves or other implanted medical devices like a pacemaker, defibrillator, or continuous glucose monitor? No   16. Do you have artificial joints? No   17. Are you allergic to latex? No       Health Care Directive:  Patient does not have a Health Care Directive or Living Will: Patient states has Advance Directive and will bring in a copy to clinic.    Preoperative Review of :   reviewed - no record of controlled substances prescribed.      Status of Chronic Conditions:  See problem list for active medical problems.  Problems all longstanding and stable, except as noted/documented.  See ROS for pertinent symptoms related to these conditions.      Review of Systems  Constitutional, neuro, ENT, endocrine, pulmonary, cardiac, gastrointestinal, genitourinary, musculoskeletal, integument and psychiatric systems are negative, except as otherwise noted.    Patient Active Problem List    Diagnosis Date Noted     Lymphedema 01/04/2018     Priority: Medium     Morbid obesity due to excess calories (H) 05/22/2017     Priority: Medium     Pessary maintenance 01/09/2017     Priority: Medium     C. 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  Document not previously scanned.  Validation form completed and sent with document to be scanned.  Code Status needs to be updated to reflect choices in most recent ACP document. Orders placed.  Confirmed/documented designated decision maker(s). See permanent comments section of demographics in clinical tab. View document(s) and details by clicking on code status.   Added by Jazmyn Gold on 8/26/2013.             Infection of eyelid 07/30/2013     Priority: Medium     Intermittent use of doxycycline to control symptoms.        CARDIOVASCULAR SCREENING; LDL GOAL LESS THAN 160 07/30/2013     Priority: Medium     Mixed incontinence urge and stress 06/29/2010     Priority: Medium     Right knee pain 06/22/2010     Priority: Medium     Arthroscopic Surgery 2001 Cartilage Repair.         Past Medical History:   Diagnosis Date     Arthritis 1990     Charcot Emilee Tooth muscular atrophy 6/22/2010    Symptoms began at age 48. Difficulty with stairs, getting up from chairs, weakness in LE's but more so in the left.       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 Cunningham, [place], Right     COLONOSCOPY  07/2010     Current Outpatient  Medications   Medication Sig Dispense Refill     ADVIL 200 MG PO CAPS ONE TO TWO TABLETS EVERY 6-8 HOURS AS NEEDED  0     buPROPion (WELLBUTRIN XL) 300 MG 24 hr tablet TAKE 1 TABLET BY MOUTH EVERY DAY IN THE MORNING 90 tablet 1     estradiol (ESTRACE) 0.5 MG tablet Place vaginally twice a week 24 tablet 3     VITAMIN D, CHOLECALCIFEROL, PO Take 1,000 Units by mouth daily         Allergies   Allergen Reactions     Nickel Hives     Jewelry       Sulfa Drugs Rash        Social History     Tobacco Use     Smoking status: Never Smoker     Smokeless tobacco: Never Used   Substance Use Topics     Alcohol use: No       History   Drug Use No         Objective     /82   Pulse 80   Temp 97.3  F (36.3  C) (Tympanic)   Resp 14   Wt 109.8 kg (242 lb)   SpO2 97%   BMI 37.90 kg/m      Physical Exam    GENERAL APPEARANCE: healthy, alert and no distress     EYES: EOMI, PERRL     HENT: ear canals and TM's normal and nose and mouth without ulcers or lesions     NECK: no adenopathy, no asymmetry, masses, or scars and thyroid normal to palpation     RESP: lungs clear to auscultation - no rales, rhonchi or wheezes     CV: regular rates and rhythm, normal S1 S2, no S3 or S4 and no murmur, click or rub     ABDOMEN:  soft, nontender, no HSM or masses and bowel sounds normal     MS: extremities normal- no gross deformities noted, no evidence of inflammation in joints, FROM in all extremities.     SKIN: no suspicious lesions or rashes     NEURO: mentation intact, speech normal, gait abnormal  and decreased tone lower extremities bilaterally  Using quad cane for balance     PSYCH: mentation appears normal. and affect normal/bright     LYMPHATICS: No cervical adenopathy    Recent Labs   Lab Test 05/06/21  1131   HGB 13.1         POTASSIUM 4.2   CR 0.77        Diagnostics:  No labs were ordered during this visit.   No EKG required for low risk surgery (cataract, skin procedure, breast biopsy, etc).    Revised Cardiac Risk  Index (RCRI):  The patient has the following serious cardiovascular risks for perioperative complications:   - No serious cardiac risks = 0 points     RCRI Interpretation: 0 points: Class I (very low risk - 0.4% complication rate)         Wellbutrin to go down to 150 mg daily in the spring   She will call when she needs this RX sent to pharmacy   To plan to stay on 300 mg thru the winter months.         Signed Electronically by: JOANN Dorsey CNP  Copy of this evaluation report is provided to requesting physician.

## 2021-12-08 ASSESSMENT — PATIENT HEALTH QUESTIONNAIRE - PHQ9: SUM OF ALL RESPONSES TO PHQ QUESTIONS 1-9: 0

## 2022-01-03 ENCOUNTER — MYC MEDICAL ADVICE (OUTPATIENT)
Dept: FAMILY MEDICINE | Facility: CLINIC | Age: 77
End: 2022-01-03
Payer: COMMERCIAL

## 2022-01-23 DIAGNOSIS — F33.2 SEVERE EPISODE OF RECURRENT MAJOR DEPRESSIVE DISORDER, WITHOUT PSYCHOTIC FEATURES (H): ICD-10-CM

## 2022-01-23 DIAGNOSIS — F41.9 ANXIETY: ICD-10-CM

## 2022-01-24 RX ORDER — BUPROPION HYDROCHLORIDE 300 MG/1
TABLET ORAL
Qty: 90 TABLET | Refills: 1 | Status: SHIPPED | OUTPATIENT
Start: 2022-01-24 | End: 2022-06-01

## 2022-02-19 ENCOUNTER — HEALTH MAINTENANCE LETTER (OUTPATIENT)
Age: 77
End: 2022-02-19

## 2022-03-10 ENCOUNTER — OFFICE VISIT (OUTPATIENT)
Dept: OBGYN | Facility: CLINIC | Age: 77
End: 2022-03-10
Payer: COMMERCIAL

## 2022-03-10 VITALS
TEMPERATURE: 99.1 F | HEIGHT: 67 IN | BODY MASS INDEX: 39.15 KG/M2 | HEART RATE: 79 BPM | RESPIRATION RATE: 16 BRPM | WEIGHT: 249.4 LBS | SYSTOLIC BLOOD PRESSURE: 150 MMHG | DIASTOLIC BLOOD PRESSURE: 68 MMHG

## 2022-03-10 DIAGNOSIS — Z46.89 PESSARY MAINTENANCE: ICD-10-CM

## 2022-03-10 PROCEDURE — 99213 OFFICE O/P EST LOW 20 MIN: CPT | Performed by: OBSTETRICS & GYNECOLOGY

## 2022-03-10 NOTE — PROGRESS NOTES
Melania is a 76 year old   female who presents for pessary recheck;  she currently has a Gelhorn pessary which she keeps in place continuously;  she reports no problems with her pessary, no discharge, no bleeding.. She is using vaginal estrogen twice a week.    All systems were reviewed and pertinent information in noted in subjective/HPI.    Past Medical History:   Diagnosis Date     Arthritis      Charcot Emilee Tooth muscular atrophy 2010    Symptoms began at age 48. Difficulty with stairs, getting up from chairs, weakness in LE's but more so in the left.       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 Cunningham, [place], Right     COLONOSCOPY  2010       Current Outpatient Medications   Medication Sig Dispense Refill     ADVIL 200 MG PO CAPS ONE TO TWO TABLETS EVERY 6-8 HOURS AS NEEDED  0     buPROPion (WELLBUTRIN XL) 300 MG 24 hr tablet TAKE 1 TABLET BY MOUTH EVERY DAY IN THE MORNING 90 tablet 1     estradiol (ESTRACE) 0.5 MG tablet Place vaginally twice a week 24 tablet 3     triamcinolone (KENALOG) 0.1 % external cream Apply topically 2 times daily For severe itching 80 g 2     VITAMIN D, CHOLECALCIFEROL, PO Take 1,000 Units by mouth daily       mupirocin (BACTROBAN) 2 % external ointment Apply topically 3 times daily For open sores (Patient not taking: Reported on 3/10/2022) 30 g 0       Social History     Socioeconomic History     Marital status: Single     Spouse name: None  "    Number of children: None     Years of education: None     Highest education level: None   Occupational History     None   Tobacco Use     Smoking status: Never Smoker     Smokeless tobacco: Never Used   Vaping Use     Vaping Use: Never used   Substance and Sexual Activity     Alcohol use: No     Drug use: No     Sexual activity: Never   Other Topics Concern     Parent/sibling w/ CABG, MI or angioplasty before 65F 55M? No   Social History Narrative     None     Social Determinants of Health     Financial Resource Strain: Not on file   Food Insecurity: Not on file   Transportation Needs: Not on file   Physical Activity: Not on file   Stress: Not on file   Social Connections: Not on file   Intimate Partner Violence: Not on file   Housing Stability: Not on file       Family History   Problem Relation Age of Onset     Hypertension Mother      Musculoskeletal Disorder Mother         CMT- Charcot Emilee Tooth     Cerebrovascular Disease Father      Respiratory Brother         Emphysema     Musculoskeletal Disorder Daughter         CMT- Charcot Emilee Tooth     Other Cancer Brother         Bone and Bladder       OBJECTIVE: BP (!) 150/68 (BP Location: Left arm, Patient Position: Chair, Cuff Size: Adult Large)   Pulse 79   Temp 99.1  F (37.3  C) (Tympanic)   Resp 16   Ht 1.702 m (5' 7\")   Wt 113.1 kg (249 lb 6.4 oz)   BMI 39.06 kg/m    No LMP recorded. Patient is postmenopausal.  The pessary was removed without difficulty, cleaned in warm water and then replaced, after inspection of the vulva and vagina, which showed no erosions and minimal leukorrhea.    ASSESSMENT:     ICD-10-CM    1. Pessary maintenance  Z46.89          PLAN: RETURN TO CLINIC 6 months for pessary care  Continue twice a week vaginal estradiol    Lisa Quarles MD      "

## 2022-05-02 DIAGNOSIS — Z46.89 PESSARY MAINTENANCE: ICD-10-CM

## 2022-05-02 RX ORDER — ESTRADIOL 0.5 MG/1
TABLET ORAL
Qty: 24 TABLET | Refills: 3 | Status: SHIPPED | OUTPATIENT
Start: 2022-05-02 | End: 2023-03-07

## 2022-05-02 NOTE — TELEPHONE ENCOUNTER
"Last Written Prescription Date:  5/25/21  Last Fill Quantity: 24,  # refills: 3   Last office visit: 3/10/2022 with prescribing provider:  Willam   Future Office Visit:  None pending    Requested Prescriptions   Pending Prescriptions Disp Refills     estradiol (ESTRACE) 0.5 MG tablet 24 tablet 3     Sig: Place vaginally twice a week       Hormone Replacement Therapy Failed - 5/2/2022 11:52 AM        Failed - Blood pressure under 140/90 in past 12 months     BP Readings from Last 3 Encounters:   03/10/22 (!) 150/68   12/07/21 138/82   10/27/21 (!) 140/75                 Passed - Recent (12 mo) or future (30 days) visit within the authorizing provider's specialty     Patient has had an office visit with the authorizing provider or a provider within the authorizing providers department within the previous 12 mos or has a future within next 30 days. See \"Patient Info\" tab in inbasket, or \"Choose Columns\" in Meds & Orders section of the refill encounter.              Passed - Medication is active on med list        Passed - Patient is 18 years of age or older        Passed - No active pregnancy on record        Passed - No positive pregnancy test on record in past 12 months           Failed replacement. Routed to provider to review.    Gisselle Villeda RN on 5/2/2022 at 12:12 PM              "

## 2022-05-25 ASSESSMENT — PATIENT HEALTH QUESTIONNAIRE - PHQ9
10. IF YOU CHECKED OFF ANY PROBLEMS, HOW DIFFICULT HAVE THESE PROBLEMS MADE IT FOR YOU TO DO YOUR WORK, TAKE CARE OF THINGS AT HOME, OR GET ALONG WITH OTHER PEOPLE: NOT DIFFICULT AT ALL
SUM OF ALL RESPONSES TO PHQ QUESTIONS 1-9: 2
SUM OF ALL RESPONSES TO PHQ QUESTIONS 1-9: 2

## 2022-06-01 ENCOUNTER — OFFICE VISIT (OUTPATIENT)
Dept: FAMILY MEDICINE | Facility: CLINIC | Age: 77
End: 2022-06-01
Payer: COMMERCIAL

## 2022-06-01 VITALS
WEIGHT: 250.6 LBS | BODY MASS INDEX: 39.33 KG/M2 | SYSTOLIC BLOOD PRESSURE: 122 MMHG | OXYGEN SATURATION: 97 % | TEMPERATURE: 98 F | RESPIRATION RATE: 22 BRPM | HEIGHT: 67 IN | HEART RATE: 72 BPM | DIASTOLIC BLOOD PRESSURE: 74 MMHG

## 2022-06-01 DIAGNOSIS — F33.2 SEVERE EPISODE OF RECURRENT MAJOR DEPRESSIVE DISORDER, WITHOUT PSYCHOTIC FEATURES (H): ICD-10-CM

## 2022-06-01 DIAGNOSIS — G47.00 INSOMNIA, UNSPECIFIED TYPE: ICD-10-CM

## 2022-06-01 DIAGNOSIS — I87.2 VENOUS STASIS DERMATITIS OF LEFT LOWER EXTREMITY: Primary | ICD-10-CM

## 2022-06-01 DIAGNOSIS — E55.9 VITAMIN D DEFICIENCY: ICD-10-CM

## 2022-06-01 DIAGNOSIS — F41.9 ANXIETY: ICD-10-CM

## 2022-06-01 DIAGNOSIS — Z23 IMMUNIZATION DUE: ICD-10-CM

## 2022-06-01 PROCEDURE — G0009 ADMIN PNEUMOCOCCAL VACCINE: HCPCS | Mod: 59 | Performed by: NURSE PRACTITIONER

## 2022-06-01 PROCEDURE — 0054A COVID-19,PF,PFIZER (12+ YRS): CPT | Performed by: NURSE PRACTITIONER

## 2022-06-01 PROCEDURE — 90677 PCV20 VACCINE IM: CPT | Performed by: NURSE PRACTITIONER

## 2022-06-01 PROCEDURE — 91305 COVID-19,PF,PFIZER (12+ YRS): CPT | Performed by: NURSE PRACTITIONER

## 2022-06-01 PROCEDURE — 99214 OFFICE O/P EST MOD 30 MIN: CPT | Mod: 25 | Performed by: NURSE PRACTITIONER

## 2022-06-01 RX ORDER — MUPIROCIN 20 MG/G
OINTMENT TOPICAL 3 TIMES DAILY
Qty: 30 G | Refills: 0 | Status: SHIPPED | OUTPATIENT
Start: 2022-06-01 | End: 2023-03-07

## 2022-06-01 RX ORDER — CEPHALEXIN 500 MG/1
500 CAPSULE ORAL 3 TIMES DAILY
Qty: 30 CAPSULE | Refills: 0 | Status: SHIPPED | OUTPATIENT
Start: 2022-06-01 | End: 2022-07-14

## 2022-06-01 RX ORDER — BUPROPION HYDROCHLORIDE 300 MG/1
TABLET ORAL
Qty: 90 TABLET | Refills: 1 | Status: SHIPPED | OUTPATIENT
Start: 2022-06-01 | End: 2022-12-15

## 2022-06-01 RX ORDER — TRIAMCINOLONE ACETONIDE 1 MG/G
CREAM TOPICAL 2 TIMES DAILY
Qty: 80 G | Refills: 2 | Status: SHIPPED | OUTPATIENT
Start: 2022-06-01 | End: 2023-03-07

## 2022-06-01 ASSESSMENT — PATIENT HEALTH QUESTIONNAIRE - PHQ9
10. IF YOU CHECKED OFF ANY PROBLEMS, HOW DIFFICULT HAVE THESE PROBLEMS MADE IT FOR YOU TO DO YOUR WORK, TAKE CARE OF THINGS AT HOME, OR GET ALONG WITH OTHER PEOPLE: NOT DIFFICULT AT ALL
SUM OF ALL RESPONSES TO PHQ QUESTIONS 1-9: 2

## 2022-06-01 NOTE — PROGRESS NOTES
Assessment & Plan     Venous stasis dermatitis of left lower extremity  Compression stocking versus Ace wraps reviewed.  Telfa dressing to prevent further skin sloughing  Begin    - cephALEXin (KEFLEX) 500 MG capsule  Dispense: 30 capsule; Refill: 0  - mupirocin (BACTROBAN) 2 % external ointment  Dispense: 30 g; Refill: 0  - triamcinolone (KENALOG) 0.1 % external cream  Dispense: 80 g; Refill: 2    Vitamin D deficiency  Labs with next blood draw recommended  Continue current supplementation    Immunization due  Done today  - Pneumococcal 20 Valent Conjugate (PCV20)  - COVID-19,PF,PFIZER (12+ YRS)    Insomnia, unspecified type  Prescription generated per her request for over-the-counter  - melatonin 5 MG tablet  Dispense: 90 tablet; Refill: 3    Severe episode of recurrent major depressive disorder, without psychotic features (H)  Continue.  Markedly improved  - buPROPion (WELLBUTRIN XL) 300 MG 24 hr tablet  Dispense: 90 tablet; Refill: 1    Anxiety  Continue.  Moderately improved  - buPROPion (WELLBUTRIN XL) 300 MG 24 hr tablet  Dispense: 90 tablet; Refill: 1    Call or return to the clinic with any worsening of symptoms or no resolution. Patient/Parent verbalized understanding and is in agreement. Medication side effects reviewed.   Current Outpatient Medications   Medication Sig Dispense Refill     ADVIL 200 MG PO CAPS ONE TO TWO TABLETS EVERY 6-8 HOURS AS NEEDED  0     buPROPion (WELLBUTRIN XL) 300 MG 24 hr tablet TAKE 1 TABLET BY MOUTH EVERY DAY IN THE MORNING 90 tablet 1     cephALEXin (KEFLEX) 500 MG capsule Take 1 capsule (500 mg) by mouth 3 times daily 30 capsule 0     estradiol (ESTRACE) 0.5 MG tablet Place vaginally twice a week 24 tablet 3     melatonin 5 MG tablet Take 1 tablet (5 mg) by mouth nightly as needed for sleep 90 tablet 3     mupirocin (BACTROBAN) 2 % external ointment Apply topically 3 times daily For open sores 30 g 0     triamcinolone (KENALOG) 0.1 % external cream Apply topically 2 times  "daily For severe itching 80 g 2     VITAMIN D, CHOLECALCIFEROL, PO Take 1,000 Units by mouth daily       Chart documentation with Dragon Voice recognition Software. Although reviewed after completion, some words and grammatical errors may remain.    JOANN Dorsey CNP  [facility]    Michelle Velázquez is a 76 year old who presents for the following health issues     History of Present Illness       Reason for visit:  Venous leg ulcers that are weeping    She eats 2-3 servings of fruits and vegetables daily.She consumes 0 sweetened beverage(s) daily.She exercises with enough effort to increase her heart rate 10 to 19 minutes per day.  She exercises with enough effort to increase her heart rate 5 days per week.   She is taking medications regularly.    Today's PHQ-9         PHQ-9 Total Score: 2    PHQ-9 Q9 Thoughts of better off dead/self-harm past 2 weeks :   Not at all    How difficult have these problems made it for you to do your work, take care of things at home, or get along with other people: Not difficult at all     June 22nd has laser surgery scheduled for her left eye.. film to be removed    Moods improved with Wellbutrin.  Anxiety is better.  Depression is better.  Would like to update her immunizations today  History of vitamin D deficiency.  Taking supplementations.  Due for labs  We will do with the next lab draw        Review of Systems   Constitutional, HEENT, cardiovascular, pulmonary, GI, , musculoskeletal, neuro, skin, endocrine and psych systems are negative, except as otherwise noted.      Objective    /74 (BP Location: Right arm, Patient Position: Sitting, Cuff Size: Adult Large)   Pulse 72   Temp 98  F (36.7  C) (Tympanic)   Resp 22   Ht 1.702 m (5' 7\")   Wt 113.7 kg (250 lb 9.6 oz)   LMP  (LMP Unknown)   SpO2 97%   Breastfeeding No   BMI 39.25 kg/m    Body mass index is 39.25 kg/m .  Physical Exam   GENERAL: alert and pale  EYES: Eyes " grossly normal to inspection, PERRL and conjunctivae and sclerae normal  HENT: ear canals and TM's normal, nose and mouth without ulcers or lesions  NECK: no adenopathy, no asymmetry, masses, or scars and thyroid normal to palpation  RESP: lungs clear to auscultation - no rales, rhonchi or wheezes  CV: regular rate and rhythm, normal S1 S2, no S3 or S4, no murmur, click or rub, no peripheral edema and peripheral pulses strong  ABDOMEN: soft, nontender, no hepatosplenomegaly, no masses and bowel sounds normal  MS: Lower extremity edema to bilaterally to midcalf, peripheral pulses normal and tenderness to palpation over areas of swelling  SKIN: no suspicious lesions or rashes and venous stasis changes noted from the knees down bilaterally with open sores on the left anterior shin  NEURO: Normal strength and tone, mentation intact and speech normal  PSYCH: mentation appears normal, affect normal/bright    Office Visit on 05/06/2021   Component Date Value Ref Range Status     Cholesterol 05/06/2021 182  <200 mg/dL Final     Triglycerides 05/06/2021 66  <150 mg/dL Final    Fasting specimen     HDL Cholesterol 05/06/2021 63  >49 mg/dL Final     LDL Cholesterol Calculated 05/06/2021 106 (A) <100 mg/dL Final    Comment: Above desirable:  100-129 mg/dl  Borderline High:  130-159 mg/dL  High:             160-189 mg/dL  Very high:       >189 mg/dl       Non HDL Cholesterol 05/06/2021 119  <130 mg/dL Final     WBC 05/06/2021 6.2  4.0 - 11.0 10e9/L Final     RBC Count 05/06/2021 4.42  3.8 - 5.2 10e12/L Final     Hemoglobin 05/06/2021 13.1  11.7 - 15.7 g/dL Final     Hematocrit 05/06/2021 40.2  35.0 - 47.0 % Final     MCV 05/06/2021 91  78 - 100 fl Final     MCH 05/06/2021 29.6  26.5 - 33.0 pg Final     MCHC 05/06/2021 32.6  31.5 - 36.5 g/dL Final     RDW 05/06/2021 14.4  10.0 - 15.0 % Final     Platelet Count 05/06/2021 203  150 - 450 10e9/L Final     % Neutrophils 05/06/2021 50.6  % Final     % Lymphocytes 05/06/2021 29.6  %  Final     % Monocytes 05/06/2021 14.3  % Final     % Eosinophils 05/06/2021 4.5  % Final     % Basophils 05/06/2021 1.0  % Final     Absolute Neutrophil 05/06/2021 3.2  1.6 - 8.3 10e9/L Final     Absolute Lymphocytes 05/06/2021 1.8  0.8 - 5.3 10e9/L Final     Absolute Monocytes 05/06/2021 0.9  0.0 - 1.3 10e9/L Final     Absolute Eosinophils 05/06/2021 0.3  0.0 - 0.7 10e9/L Final     Absolute Basophils 05/06/2021 0.1  0.0 - 0.2 10e9/L Final     Diff Method 05/06/2021 Automated Method   Final     Sodium 05/06/2021 140  133 - 144 mmol/L Final     Potassium 05/06/2021 4.2  3.4 - 5.3 mmol/L Final     Chloride 05/06/2021 107  94 - 109 mmol/L Final     Carbon Dioxide 05/06/2021 31  20 - 32 mmol/L Final     Anion Gap 05/06/2021 2 (A) 3 - 14 mmol/L Final     Glucose 05/06/2021 86  70 - 99 mg/dL Final    Fasting specimen     Urea Nitrogen 05/06/2021 16  7 - 30 mg/dL Final     Creatinine 05/06/2021 0.77  0.52 - 1.04 mg/dL Final     GFR Estimate 05/06/2021 75  >60 mL/min/[1.73_m2] Final    Comment: Non  GFR Calc  Starting 12/18/2018, serum creatinine based estimated GFR (eGFR) will be   calculated using the Chronic Kidney Disease Epidemiology Collaboration   (CKD-EPI) equation.       GFR Estimate If Black 05/06/2021 86  >60 mL/min/[1.73_m2] Final    Comment:  GFR Calc  Starting 12/18/2018, serum creatinine based estimated GFR (eGFR) will be   calculated using the Chronic Kidney Disease Epidemiology Collaboration   (CKD-EPI) equation.       Calcium 05/06/2021 9.3  8.5 - 10.1 mg/dL Final     Vitamin D Deficiency screening 05/06/2021 33  20 - 75 ug/L Final    Comment: Season, race, dietary intake, and treatment affect the concentration of   25-hydroxy-Vitamin D. Values may decrease during winter months and increase   during summer months. Values 20-29 ug/L may indicate Vitamin D insufficiency   and values <20 ug/L may indicate Vitamin D deficiency.  Vitamin D determination is routinely performed by  an immunoassay specific for   25 hydroxyvitamin D3.  If an individual is on vitamin D2 (ergocalciferol)   supplementation, please specify 25 OH vitamin D2 and D3 level determination by   LCMSMS test [id number].

## 2022-07-14 ENCOUNTER — OFFICE VISIT (OUTPATIENT)
Dept: OBGYN | Facility: CLINIC | Age: 77
End: 2022-07-14
Payer: COMMERCIAL

## 2022-07-14 VITALS
HEIGHT: 67 IN | WEIGHT: 248 LBS | DIASTOLIC BLOOD PRESSURE: 79 MMHG | BODY MASS INDEX: 38.92 KG/M2 | SYSTOLIC BLOOD PRESSURE: 150 MMHG | TEMPERATURE: 99.2 F | RESPIRATION RATE: 18 BRPM | HEART RATE: 76 BPM

## 2022-07-14 DIAGNOSIS — Z46.89 PESSARY MAINTENANCE: Primary | ICD-10-CM

## 2022-07-14 PROCEDURE — 99213 OFFICE O/P EST LOW 20 MIN: CPT | Performed by: OBSTETRICS & GYNECOLOGY

## 2022-07-14 NOTE — PROGRESS NOTES
Melania is a 76 year old   female who presents for pessary recheck;  she currently has a Gelhorn pessary which she keeps in place continuously;  she reports no problems with her pessary, no discharge, no bleeding.. She is using vaginal estrogen twice a week.    All systems were reviewed and pertinent information in noted in subjective/HPI.    Past Medical History:   Diagnosis Date     Arthritis      Charcot Emilee Tooth muscular atrophy 2010    Symptoms began at age 48. Difficulty with stairs, getting up from chairs, weakness in LE's but more so in the left.       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 Cunningham, [place], Right     COLONOSCOPY  2010       Current Outpatient Medications   Medication Sig Dispense Refill     ADVIL 200 MG PO CAPS ONE TO TWO TABLETS EVERY 6-8 HOURS AS NEEDED  0     buPROPion (WELLBUTRIN XL) 300 MG 24 hr tablet TAKE 1 TABLET BY MOUTH EVERY DAY IN THE MORNING 90 tablet 1     estradiol (ESTRACE) 0.5 MG tablet Place vaginally twice a week 24 tablet 3     melatonin 5 MG tablet Take 1 tablet (5 mg) by mouth nightly as needed for sleep 90 tablet 3     mupirocin (BACTROBAN) 2 % external ointment Apply topically 3 times daily For open sores 30 g 0     triamcinolone (KENALOG) 0.1 % external cream Apply topically 2 times daily For severe itching 80 g 2     VITAMIN D, CHOLECALCIFEROL, PO Take 1,000 Units by mouth daily         Social History     Socioeconomic History  "    Marital status: Single     Spouse name: None     Number of children: None     Years of education: None     Highest education level: None   Tobacco Use     Smoking status: Never Smoker     Smokeless tobacco: Never Used   Vaping Use     Vaping Use: Never used   Substance and Sexual Activity     Alcohol use: No     Drug use: No     Sexual activity: Not Currently   Other Topics Concern     Parent/sibling w/ CABG, MI or angioplasty before 65F 55M? No       Family History   Problem Relation Age of Onset     Hypertension Mother      Musculoskeletal Disorder Mother         CMT- Charcot Emilee Tooth     Cerebrovascular Disease Father      Respiratory Brother         Emphysema     Musculoskeletal Disorder Daughter         CMT- Charcot Emilee Tooth     Other Cancer Brother         Bone and Bladder       OBJECTIVE: BP (!) 150/79 (BP Location: Left arm, Patient Position: Chair, Cuff Size: Adult Large)   Pulse 76   Temp 99.2  F (37.3  C) (Tympanic)   Resp 18   Ht 1.702 m (5' 7\")   Wt 112.5 kg (248 lb)   LMP  (LMP Unknown)   Breastfeeding No   BMI 38.84 kg/m    No LMP recorded (lmp unknown). Patient is postmenopausal.  The pessary was removed without difficulty, cleaned in warm water and then replaced, after inspection of the vulva and vagina, which showed no erosions.    ASSESSMENT:     ICD-10-CM    1. Pessary maintenance  Z46.89          PLAN: RETURN TO CLINIC 4 months for pessary cleaning; continue twice a week vaginal estradiol    Lisa Quarles MD      "

## 2022-07-14 NOTE — NURSING NOTE
"Initial BP (!) 150/79 (BP Location: Left arm, Patient Position: Chair, Cuff Size: Adult Large)   Pulse 76   Temp 99.2  F (37.3  C) (Tympanic)   Resp 18   Ht 1.702 m (5' 7\")   Wt 112.5 kg (248 lb)   LMP  (LMP Unknown)   Breastfeeding No   BMI 38.84 kg/m   Estimated body mass index is 38.84 kg/m  as calculated from the following:    Height as of this encounter: 1.702 m (5' 7\").    Weight as of this encounter: 112.5 kg (248 lb). .      "

## 2022-10-22 ENCOUNTER — HEALTH MAINTENANCE LETTER (OUTPATIENT)
Age: 77
End: 2022-10-22

## 2022-10-24 ENCOUNTER — OFFICE VISIT (OUTPATIENT)
Dept: OBGYN | Facility: CLINIC | Age: 77
End: 2022-10-24
Payer: COMMERCIAL

## 2022-10-24 VITALS
RESPIRATION RATE: 16 BRPM | DIASTOLIC BLOOD PRESSURE: 72 MMHG | TEMPERATURE: 98.7 F | WEIGHT: 252.8 LBS | SYSTOLIC BLOOD PRESSURE: 125 MMHG | HEIGHT: 67 IN | HEART RATE: 75 BPM | BODY MASS INDEX: 39.68 KG/M2

## 2022-10-24 DIAGNOSIS — Z46.89 PESSARY MAINTENANCE: Primary | ICD-10-CM

## 2022-10-24 PROCEDURE — 99213 OFFICE O/P EST LOW 20 MIN: CPT | Performed by: OBSTETRICS & GYNECOLOGY

## 2022-10-24 NOTE — PROGRESS NOTES
Melania is a 76 year old   female who presents for pessary recheck;  she currently has a Gelhorn pessary which she keeps in place continuously;  she reports no problems with her pessary, no discharge, no bleeding.. She is using vaginal estrogen twice a week.     All systems were reviewed and pertinent information in noted in subjective/HPI.     Past Medical History        Past Medical History:   Diagnosis Date     Arthritis      Charcot Emilee Tooth muscular atrophy 2010     Symptoms began at age 48. Difficulty with stairs, getting up from chairs, weakness in LE's but more so in the left.       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 Cunningham, [place], Right     COLONOSCOPY   2010            Current Outpatient Prescriptions          Current Outpatient Medications   Medication Sig Dispense Refill     ADVIL 200 MG PO CAPS ONE TO TWO TABLETS EVERY 6-8 HOURS AS NEEDED   0     buPROPion (WELLBUTRIN XL) 300 MG 24 hr tablet TAKE 1 TABLET BY MOUTH EVERY DAY IN THE MORNING 90 tablet 1     estradiol (ESTRACE) 0.5 MG tablet Place vaginally twice a week 24 tablet 3     melatonin 5 MG tablet Take 1 tablet (5 mg) by mouth nightly as needed for sleep 90 tablet 3     mupirocin (BACTROBAN) 2 % external ointment Apply topically 3 times daily For open sores 30 g 0     triamcinolone (KENALOG) 0.1 % external cream Apply topically 2 times daily For severe  "itching 80 g 2     VITAMIN D, CHOLECALCIFEROL, PO Take 1,000 Units by mouth daily                Social History            Socioeconomic History     Marital status: Single       Spouse name: None     Number of children: None     Years of education: None     Highest education level: None   Tobacco Use     Smoking status: Never Smoker     Smokeless tobacco: Never Used   Vaping Use     Vaping Use: Never used   Substance and Sexual Activity     Alcohol use: No     Drug use: No     Sexual activity: Not Currently   Other Topics Concern     Parent/sibling w/ CABG, MI or angioplasty before 65F 55M? No         Family History         Family History   Problem Relation Age of Onset     Hypertension Mother       Musculoskeletal Disorder Mother           CMT- Charcot Emilee Tooth     Cerebrovascular Disease Father       Respiratory Brother           Emphysema     Musculoskeletal Disorder Daughter           CMT- Charcot Emilee Tooth     Other Cancer Brother           Bone and Bladder            OBJECTIVE: /72 (BP Location: Left arm, Patient Position: Sitting, Cuff Size: Adult Large)   Pulse 75   Temp 98.7  F (37.1  C)   Resp 16   Ht 1.702 m (5' 7\")   Wt 114.7 kg (252 lb 12.8 oz)   LMP  (LMP Unknown)   BMI 39.59 kg/m     Patient is postmenopausal.  The pessary was removed without difficulty, cleaned in hibiclens and warm water and then replaced, after inspection of the vulva and vagina, which showed no erosions.     ASSESSMENT:       ICD-10-CM     1. Pessary maintenance  Z46.89              PLAN: RETURN TO CLINIC 4 months for pessary cleaning; continue twice a week vaginal estradiol    Anne Wallace MD  OB/GYN        "

## 2022-12-15 DIAGNOSIS — F41.9 ANXIETY: ICD-10-CM

## 2022-12-15 DIAGNOSIS — F33.2 SEVERE EPISODE OF RECURRENT MAJOR DEPRESSIVE DISORDER, WITHOUT PSYCHOTIC FEATURES (H): ICD-10-CM

## 2022-12-15 RX ORDER — BUPROPION HYDROCHLORIDE 300 MG/1
TABLET ORAL
Qty: 90 TABLET | Refills: 1 | Status: SHIPPED | OUTPATIENT
Start: 2022-12-15 | End: 2023-03-07

## 2022-12-15 NOTE — TELEPHONE ENCOUNTER
"Requested Prescriptions   Pending Prescriptions Disp Refills    buPROPion (WELLBUTRIN XL) 300 MG 24 hr tablet [Pharmacy Med Name: BUPROPION HCL  MG TABLET] 90 tablet 1     Sig: TAKE 1 TABLET BY MOUTH EVERY DAY IN THE MORNING       SSRIs Protocol Failed - 12/15/2022 12:43 AM        Failed - PHQ-9 score less than 5 in past 6 months     Please review last PHQ-9 score.           Failed - Recent (6 mo) or future (30 days) visit within the authorizing provider's specialty     Patient had office visit in the last 6 months or has a visit in the next 30 days with authorizing provider or within the authorizing provider's specialty.  See \"Patient Info\" tab in inbasket, or \"Choose Columns\" in Meds & Orders section of the refill encounter.            Passed - Medication is Bupropion     If the medication is Bupropion (Wellbutrin), and the patient is taking for smoking cessation; OK to refill.          Passed - Medication is active on med list        Passed - Patient is age 18 or older        Passed - No active pregnancy on record        Passed - No positive pregnancy test in last 12 months          Sent PHQ-9 via Collections for patient to complete.  Julie Behrendt RN    "

## 2023-01-01 ENCOUNTER — OFFICE VISIT (OUTPATIENT)
Dept: OBGYN | Facility: CLINIC | Age: 78
End: 2023-01-01
Payer: COMMERCIAL

## 2023-01-01 ENCOUNTER — OFFICE VISIT (OUTPATIENT)
Dept: FAMILY MEDICINE | Facility: CLINIC | Age: 78
End: 2023-01-01
Payer: COMMERCIAL

## 2023-01-01 ENCOUNTER — HOSPITAL ENCOUNTER (OUTPATIENT)
Dept: BONE DENSITY | Facility: CLINIC | Age: 78
Discharge: HOME | End: 2023-07-18
Attending: NURSE PRACTITIONER | Admitting: NURSE PRACTITIONER
Payer: COMMERCIAL

## 2023-01-01 ENCOUNTER — TELEPHONE (OUTPATIENT)
Dept: FAMILY MEDICINE | Facility: CLINIC | Age: 78
End: 2023-01-01
Payer: COMMERCIAL

## 2023-01-01 ENCOUNTER — MYC MEDICAL ADVICE (OUTPATIENT)
Dept: FAMILY MEDICINE | Facility: CLINIC | Age: 78
End: 2023-01-01
Payer: COMMERCIAL

## 2023-01-01 ENCOUNTER — MEDICAL CORRESPONDENCE (OUTPATIENT)
Dept: HEALTH INFORMATION MANAGEMENT | Facility: CLINIC | Age: 78
End: 2023-01-01
Payer: COMMERCIAL

## 2023-01-01 VITALS
HEIGHT: 68 IN | WEIGHT: 262 LBS | HEART RATE: 83 BPM | TEMPERATURE: 97.8 F | OXYGEN SATURATION: 99 % | BODY MASS INDEX: 39.71 KG/M2 | SYSTOLIC BLOOD PRESSURE: 126 MMHG | RESPIRATION RATE: 14 BRPM | DIASTOLIC BLOOD PRESSURE: 70 MMHG

## 2023-01-01 VITALS
DIASTOLIC BLOOD PRESSURE: 69 MMHG | HEART RATE: 68 BPM | BODY MASS INDEX: 41.09 KG/M2 | HEIGHT: 67 IN | RESPIRATION RATE: 16 BRPM | WEIGHT: 261.8 LBS | TEMPERATURE: 99.4 F | SYSTOLIC BLOOD PRESSURE: 132 MMHG

## 2023-01-01 VITALS
HEIGHT: 68 IN | HEART RATE: 72 BPM | SYSTOLIC BLOOD PRESSURE: 130 MMHG | OXYGEN SATURATION: 96 % | DIASTOLIC BLOOD PRESSURE: 74 MMHG | WEIGHT: 262 LBS | RESPIRATION RATE: 16 BRPM | TEMPERATURE: 99.4 F | BODY MASS INDEX: 39.71 KG/M2

## 2023-01-01 DIAGNOSIS — Z46.89 PESSARY MAINTENANCE: Primary | ICD-10-CM

## 2023-01-01 DIAGNOSIS — I89.0 LYMPHEDEMA: ICD-10-CM

## 2023-01-01 DIAGNOSIS — M17.0 PRIMARY OSTEOARTHRITIS OF BOTH KNEES: ICD-10-CM

## 2023-01-01 DIAGNOSIS — G60.0 CMT (CHARCOT-MARIE-TOOTH DISEASE): Primary | ICD-10-CM

## 2023-01-01 DIAGNOSIS — L03.119 CELLULITIS AND ABSCESS OF LEG: ICD-10-CM

## 2023-01-01 DIAGNOSIS — L02.419 CELLULITIS AND ABSCESS OF LEG: ICD-10-CM

## 2023-01-01 DIAGNOSIS — Z23 NEED FOR TDAP VACCINATION: ICD-10-CM

## 2023-01-01 DIAGNOSIS — Z78.0 MENOPAUSE: ICD-10-CM

## 2023-01-01 DIAGNOSIS — Z74.09 LIMITED MOBILITY: ICD-10-CM

## 2023-01-01 DIAGNOSIS — I87.2 VENOUS STASIS DERMATITIS OF BOTH LOWER EXTREMITIES: ICD-10-CM

## 2023-01-01 DIAGNOSIS — L03.119 CELLULITIS AND ABSCESS OF LEG: Primary | ICD-10-CM

## 2023-01-01 DIAGNOSIS — Z23 NEED FOR SHINGLES VACCINE: ICD-10-CM

## 2023-01-01 DIAGNOSIS — L97.919 VARICOSE VEINS OF RIGHT LEG WITH BOTH ULCER OF SITE AND INFLAMMATION (H): Primary | ICD-10-CM

## 2023-01-01 DIAGNOSIS — L02.419 CELLULITIS AND ABSCESS OF LEG: Primary | ICD-10-CM

## 2023-01-01 DIAGNOSIS — E55.9 VITAMIN D DEFICIENCY: ICD-10-CM

## 2023-01-01 DIAGNOSIS — I87.2 VENOUS STASIS DERMATITIS OF LEFT LOWER EXTREMITY: Primary | ICD-10-CM

## 2023-01-01 DIAGNOSIS — G60.0 CMT (CHARCOT-MARIE-TOOTH DISEASE): ICD-10-CM

## 2023-01-01 DIAGNOSIS — I83.219 VARICOSE VEINS OF RIGHT LEG WITH BOTH ULCER OF SITE AND INFLAMMATION (H): Primary | ICD-10-CM

## 2023-01-01 DIAGNOSIS — I87.2 VENOUS STASIS DERMATITIS OF LEFT LOWER EXTREMITY: ICD-10-CM

## 2023-01-01 PROCEDURE — 99213 OFFICE O/P EST LOW 20 MIN: CPT | Performed by: OBSTETRICS & GYNECOLOGY

## 2023-01-01 PROCEDURE — 77080 DXA BONE DENSITY AXIAL: CPT

## 2023-01-01 PROCEDURE — 99214 OFFICE O/P EST MOD 30 MIN: CPT | Performed by: NURSE PRACTITIONER

## 2023-01-01 RX ORDER — TRIAMCINOLONE ACETONIDE 1 MG/G
CREAM TOPICAL 2 TIMES DAILY
Qty: 80 G | Refills: 2 | Status: SHIPPED | OUTPATIENT
Start: 2023-01-01

## 2023-01-01 RX ORDER — MUPIROCIN 20 MG/G
OINTMENT TOPICAL 3 TIMES DAILY
Qty: 30 G | Refills: 4 | Status: SHIPPED | OUTPATIENT
Start: 2023-01-01

## 2023-01-01 RX ORDER — METHYLPREDNISOLONE 4 MG
TABLET, DOSE PACK ORAL
Qty: 21 TABLET | Refills: 0 | Status: SHIPPED | OUTPATIENT
Start: 2023-01-01 | End: 2023-01-01

## 2023-01-01 RX ORDER — MELOXICAM 15 MG/1
15 TABLET ORAL DAILY
Qty: 30 TABLET | Refills: 2 | Status: SHIPPED | OUTPATIENT
Start: 2023-01-01 | End: 2024-01-01

## 2023-01-01 RX ORDER — RESPIRATORY SYNCYTIAL VIRUS VACCINE 120MCG/0.5
0.5 KIT INTRAMUSCULAR ONCE
Qty: 1 EACH | Refills: 0 | Status: CANCELLED | OUTPATIENT
Start: 2023-01-01 | End: 2023-01-01

## 2023-01-01 RX ORDER — METHYLPREDNISOLONE 4 MG
TABLET, DOSE PACK ORAL
Qty: 21 TABLET | Refills: 0 | Status: SHIPPED | OUTPATIENT
Start: 2023-01-01 | End: 2024-01-01

## 2023-01-01 RX ORDER — CEPHALEXIN 500 MG/1
500 CAPSULE ORAL 2 TIMES DAILY
Qty: 20 CAPSULE | Refills: 0 | Status: SHIPPED | OUTPATIENT
Start: 2023-01-01 | End: 2023-01-01

## 2023-01-01 RX ORDER — CEPHALEXIN 500 MG/1
500 CAPSULE ORAL 2 TIMES DAILY
Qty: 20 CAPSULE | Refills: 0 | Status: SHIPPED | OUTPATIENT
Start: 2023-01-01 | End: 2024-01-01

## 2023-01-01 ASSESSMENT — PAIN SCALES - GENERAL
PAINLEVEL: EXTREME PAIN (8)
PAINLEVEL: MODERATE PAIN (4)

## 2023-01-01 ASSESSMENT — PATIENT HEALTH QUESTIONNAIRE - PHQ9: SUM OF ALL RESPONSES TO PHQ QUESTIONS 1-9: 3

## 2023-03-07 ENCOUNTER — OFFICE VISIT (OUTPATIENT)
Dept: FAMILY MEDICINE | Facility: CLINIC | Age: 78
End: 2023-03-07
Payer: COMMERCIAL

## 2023-03-07 ENCOUNTER — TELEPHONE (OUTPATIENT)
Dept: FAMILY MEDICINE | Facility: CLINIC | Age: 78
End: 2023-03-07

## 2023-03-07 DIAGNOSIS — R79.89 OTHER SPECIFIED ABNORMAL FINDINGS OF BLOOD CHEMISTRY: ICD-10-CM

## 2023-03-07 DIAGNOSIS — I87.2 VENOUS STASIS DERMATITIS OF LEFT LOWER EXTREMITY: ICD-10-CM

## 2023-03-07 DIAGNOSIS — E55.9 VITAMIN D DEFICIENCY: ICD-10-CM

## 2023-03-07 DIAGNOSIS — G60.0 CMT (CHARCOT-MARIE-TOOTH DISEASE): ICD-10-CM

## 2023-03-07 DIAGNOSIS — I89.0 LYMPHEDEMA: ICD-10-CM

## 2023-03-07 DIAGNOSIS — L97.919 VARICOSE VEINS OF RIGHT LEG WITH BOTH ULCER OF SITE AND INFLAMMATION (H): Primary | ICD-10-CM

## 2023-03-07 DIAGNOSIS — I83.219 VARICOSE VEINS OF RIGHT LEG WITH BOTH ULCER OF SITE AND INFLAMMATION (H): Primary | ICD-10-CM

## 2023-03-07 DIAGNOSIS — F33.2 SEVERE EPISODE OF RECURRENT MAJOR DEPRESSIVE DISORDER, WITHOUT PSYCHOTIC FEATURES (H): ICD-10-CM

## 2023-03-07 DIAGNOSIS — Z46.89 PESSARY MAINTENANCE: ICD-10-CM

## 2023-03-07 DIAGNOSIS — Z78.0 MENOPAUSE: ICD-10-CM

## 2023-03-07 PROBLEM — E66.01 MORBID OBESITY DUE TO EXCESS CALORIES (H): Status: RESOLVED | Noted: 2017-05-22 | Resolved: 2023-03-07

## 2023-03-07 LAB
ALBUMIN SERPL BCG-MCNC: 4.1 G/DL (ref 3.5–5.2)
ALP SERPL-CCNC: 96 U/L (ref 35–104)
ALT SERPL W P-5'-P-CCNC: 12 U/L (ref 10–35)
ANION GAP SERPL CALCULATED.3IONS-SCNC: 10 MMOL/L (ref 7–15)
AST SERPL W P-5'-P-CCNC: 14 U/L (ref 10–35)
BASOPHILS # BLD AUTO: 0 10E3/UL (ref 0–0.2)
BASOPHILS NFR BLD AUTO: 0 %
BILIRUB SERPL-MCNC: 0.4 MG/DL
BUN SERPL-MCNC: 12.8 MG/DL (ref 8–23)
CALCIUM SERPL-MCNC: 9.8 MG/DL (ref 8.8–10.2)
CHLORIDE SERPL-SCNC: 105 MMOL/L (ref 98–107)
CREAT SERPL-MCNC: 0.75 MG/DL (ref 0.51–0.95)
DEPRECATED HCO3 PLAS-SCNC: 28 MMOL/L (ref 22–29)
EOSINOPHIL # BLD AUTO: 0.3 10E3/UL (ref 0–0.7)
EOSINOPHIL NFR BLD AUTO: 4 %
ERYTHROCYTE [DISTWIDTH] IN BLOOD BY AUTOMATED COUNT: 14.4 % (ref 10–15)
GFR SERPL CREATININE-BSD FRML MDRD: 81 ML/MIN/1.73M2
GLUCOSE SERPL-MCNC: 86 MG/DL (ref 70–99)
HCT VFR BLD AUTO: 41.6 % (ref 35–47)
HGB BLD-MCNC: 13.1 G/DL (ref 11.7–15.7)
IMM GRANULOCYTES # BLD: 0 10E3/UL
IMM GRANULOCYTES NFR BLD: 0 %
LYMPHOCYTES # BLD AUTO: 1.8 10E3/UL (ref 0.8–5.3)
LYMPHOCYTES NFR BLD AUTO: 27 %
MCH RBC QN AUTO: 29 PG (ref 26.5–33)
MCHC RBC AUTO-ENTMCNC: 31.5 G/DL (ref 31.5–36.5)
MCV RBC AUTO: 92 FL (ref 78–100)
MONOCYTES # BLD AUTO: 0.9 10E3/UL (ref 0–1.3)
MONOCYTES NFR BLD AUTO: 13 %
NEUTROPHILS # BLD AUTO: 3.7 10E3/UL (ref 1.6–8.3)
NEUTROPHILS NFR BLD AUTO: 55 %
PLATELET # BLD AUTO: 236 10E3/UL (ref 150–450)
POTASSIUM SERPL-SCNC: 4.4 MMOL/L (ref 3.4–5.3)
PROT SERPL-MCNC: 6.9 G/DL (ref 6.4–8.3)
RBC # BLD AUTO: 4.52 10E6/UL (ref 3.8–5.2)
SODIUM SERPL-SCNC: 143 MMOL/L (ref 136–145)
TSH SERPL DL<=0.005 MIU/L-ACNC: 3.71 UIU/ML (ref 0.3–4.2)
WBC # BLD AUTO: 6.7 10E3/UL (ref 4–11)

## 2023-03-07 PROCEDURE — 83721 ASSAY OF BLOOD LIPOPROTEIN: CPT | Performed by: NURSE PRACTITIONER

## 2023-03-07 PROCEDURE — 80053 COMPREHEN METABOLIC PANEL: CPT | Performed by: NURSE PRACTITIONER

## 2023-03-07 PROCEDURE — 36415 COLL VENOUS BLD VENIPUNCTURE: CPT | Performed by: NURSE PRACTITIONER

## 2023-03-07 PROCEDURE — 84443 ASSAY THYROID STIM HORMONE: CPT | Performed by: NURSE PRACTITIONER

## 2023-03-07 PROCEDURE — 99215 OFFICE O/P EST HI 40 MIN: CPT | Performed by: NURSE PRACTITIONER

## 2023-03-07 PROCEDURE — 82306 VITAMIN D 25 HYDROXY: CPT | Performed by: NURSE PRACTITIONER

## 2023-03-07 PROCEDURE — 85025 COMPLETE CBC W/AUTO DIFF WBC: CPT | Performed by: NURSE PRACTITIONER

## 2023-03-07 RX ORDER — MUPIROCIN 20 MG/G
OINTMENT TOPICAL 3 TIMES DAILY
Qty: 30 G | Refills: 4 | Status: SHIPPED | OUTPATIENT
Start: 2023-03-07 | End: 2023-01-01

## 2023-03-07 RX ORDER — ESTRADIOL 0.5 MG/1
TABLET ORAL
Qty: 24 TABLET | Refills: 3 | Status: SHIPPED | OUTPATIENT
Start: 2023-03-07 | End: 2024-01-01

## 2023-03-07 RX ORDER — BUPROPION HYDROCHLORIDE 150 MG/1
150 TABLET ORAL EVERY MORNING
Qty: 90 TABLET | Refills: 3 | Status: SHIPPED | OUTPATIENT
Start: 2023-03-07 | End: 2024-01-01

## 2023-03-07 RX ORDER — MUPIROCIN 20 MG/G
OINTMENT TOPICAL 3 TIMES DAILY
Qty: 30 G | Refills: 0 | Status: SHIPPED | OUTPATIENT
Start: 2023-03-07 | End: 2023-03-07

## 2023-03-07 RX ORDER — TRIAMCINOLONE ACETONIDE 1 MG/G
CREAM TOPICAL 2 TIMES DAILY
Qty: 80 G | Refills: 2 | Status: SHIPPED | OUTPATIENT
Start: 2023-03-07 | End: 2023-01-01

## 2023-03-07 ASSESSMENT — PATIENT HEALTH QUESTIONNAIRE - PHQ9
SUM OF ALL RESPONSES TO PHQ QUESTIONS 1-9: 0
SUM OF ALL RESPONSES TO PHQ QUESTIONS 1-9: 0

## 2023-03-07 ASSESSMENT — PAIN SCALES - GENERAL: PAINLEVEL: EXTREME PAIN (8)

## 2023-03-07 NOTE — TELEPHONE ENCOUNTER
Patient calling about getting pessary change   Had to cancel appt due to weather lives in [place] and is now not able to get in until April and that will be 6 months since last pessary maintenance   Using estrogen cream twice a week  Worried about not getting in sooner for pessary maintenance   Appointment made for soonest available but not until April 27 she is to have it changes every three month. Could you get her a any sooner appointment?   Please call patient if possible   Last pessary change was sept 24 2022

## 2023-03-08 LAB — LDLC SERPL DIRECT ASSAY-MCNC: 113 MG/DL

## 2023-03-09 NOTE — TELEPHONE ENCOUNTER
More Taylor MD Breiwick, Sara J, CMA; [facility] Ob Triage Pool 58 minutes ago (9:50 AM)     AH  Newer guidelines state that we can do pessary checks every 6 months.  However, if she has concerns or pain, I would be happy to work her in for a quick visit when I return from vacation (last two weeks of March).  If there are any 15 minute openings or prenatal spots, OK to use them.     More Taylor MD on 3/9/2023 at 9:50 AM      Pt having no concerns at this time. RN called and offered to schedule a sooner appt and pt states if new guidelines recommend every 6 months then she is happy to wait until April.    Appt left as previously scheduled. Patient verbalized understanding and agreed to plan.       Tova Gooden RN on 3/9/2023 at 10:56 AM

## 2023-03-13 LAB
DEPRECATED CALCIDIOL+CALCIFEROL SERPL-MC: <37 UG/L (ref 20–75)
VITAMIN D2 SERPL-MCNC: <5 UG/L
VITAMIN D3 SERPL-MCNC: 32 UG/L

## 2023-03-17 VITALS
BODY MASS INDEX: 39.39 KG/M2 | HEART RATE: 72 BPM | OXYGEN SATURATION: 99 % | DIASTOLIC BLOOD PRESSURE: 88 MMHG | TEMPERATURE: 97.3 F | HEIGHT: 67 IN | SYSTOLIC BLOOD PRESSURE: 138 MMHG | RESPIRATION RATE: 18 BRPM | WEIGHT: 251 LBS

## 2023-04-01 ENCOUNTER — HEALTH MAINTENANCE LETTER (OUTPATIENT)
Age: 78
End: 2023-04-01

## 2023-04-20 ENCOUNTER — MEDICAL CORRESPONDENCE (OUTPATIENT)
Dept: HEALTH INFORMATION MANAGEMENT | Facility: CLINIC | Age: 78
End: 2023-04-20
Payer: COMMERCIAL

## 2023-04-24 ENCOUNTER — OFFICE VISIT (OUTPATIENT)
Dept: OBGYN | Facility: CLINIC | Age: 78
End: 2023-04-24
Payer: COMMERCIAL

## 2023-04-24 VITALS
HEART RATE: 72 BPM | TEMPERATURE: 99 F | WEIGHT: 251.3 LBS | HEIGHT: 67 IN | DIASTOLIC BLOOD PRESSURE: 73 MMHG | BODY MASS INDEX: 39.44 KG/M2 | SYSTOLIC BLOOD PRESSURE: 146 MMHG | RESPIRATION RATE: 16 BRPM

## 2023-04-24 DIAGNOSIS — Z46.89 PESSARY MAINTENANCE: Primary | ICD-10-CM

## 2023-04-24 PROCEDURE — 99213 OFFICE O/P EST LOW 20 MIN: CPT | Performed by: OBSTETRICS & GYNECOLOGY

## 2023-04-24 NOTE — PROGRESS NOTES
"Initial BP (!) 146/73 (BP Location: Left arm, Patient Position: Chair, Cuff Size: Adult Large)   Pulse 72   Temp 99  F (37.2  C) (Tympanic)   Resp 16   Ht 1.702 m (5' 7\")   Wt 114 kg (251 lb 4.8 oz)   LMP  (LMP Unknown)   BMI 39.36 kg/m   Estimated body mass index is 39.36 kg/m  as calculated from the following:    Height as of this encounter: 1.702 m (5' 7\").    Weight as of this encounter: 114 kg (251 lb 4.8 oz). .      "

## 2023-04-24 NOTE — PROGRESS NOTES
Melania is a 76 year old   female who presents for pessary recheck;  she currently has a Gelhorn pessary which she keeps in place continuously;  she reports no problems with her pessary, no discharge, no bleeding.. She is using vaginal estrogen twice a week.     All systems were reviewed and pertinent information in noted in subjective/HPI.     Past Medical History:   Diagnosis Date     Arthritis      Charcot Emilee Tooth muscular atrophy 2010    Symptoms began at age 48. Difficulty with stairs, getting up from chairs, weakness in LE's but more so in the left.       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 Cunningham, [place], Right     COLONOSCOPY  2010     Current Outpatient Medications   Medication Instructions     ADVIL 200 MG PO CAPS ONE TO TWO TABLETS EVERY 6-8 HOURS AS NEEDED     buPROPion (WELLBUTRIN XL) 150 mg, Oral, EVERY MORNING     estradiol (ESTRACE) 0.5 MG tablet Place vaginally twice a week     melatonin 5 mg, Oral, AT BEDTIME PRN     mupirocin (BACTROBAN) 2 % external ointment Topical, 3 TIMES DAILY, For open sores     triamcinolone (KENALOG) 0.1 % external cream Topical, 2 TIMES DAILY, For severe itching     VITAMIN D, CHOLECALCIFEROL, PO 1,000 Units, Oral, DAILY     fd  Social History                Socioeconomic History     Marital status: Single       Spouse name: None     Number of children: None     Years of education: None     Highest education level: None  "  Tobacco Use     Smoking status: Never Smoker     Smokeless tobacco: Never Used   Vaping Use     Vaping Use: Never used   Substance and Sexual Activity     Alcohol use: No     Drug use: No     Sexual activity: Not Currently   Other Topics Concern     Parent/sibling w/ CABG, MI or angioplasty before 65F 55M? No         Family History             Family History   Problem Relation Age of Onset     Hypertension Mother       Musculoskeletal Disorder Mother           CMT- Charcot Emilee Tooth     Cerebrovascular Disease Father       Respiratory Brother           Emphysema     Musculoskeletal Disorder Daughter           CMT- Charcot Emilee Tooth     Other Cancer Brother           Bone and Bladder            OBJECTIVE: BP (!) 146/73 (BP Location: Left arm, Patient Position: Chair, Cuff Size: Adult Large)   Pulse 72   Temp 99  F (37.2  C) (Tympanic)   Resp 16   Ht 1.702 m (5' 7\")   Wt 114 kg (251 lb 4.8 oz)   LMP  (LMP Unknown)   BMI 39.36 kg/m     Patient is postmenopausal.  The pessary was removed without difficulty, cleaned in hibiclens and warm water and then replaced, after inspection of the vulva, vagina and cervix which showed no erosions.     ASSESSMENT:       ICD-10-CM     1. Pessary maintenance  Z46.89              PLAN: RETURN TO CLINIC 4 months for pessary cleaning; continue twice a week vaginal estradiol     Anne Wallace MD  OB/GYN        "

## 2023-04-26 ENCOUNTER — MYC MEDICAL ADVICE (OUTPATIENT)
Dept: FAMILY MEDICINE | Facility: CLINIC | Age: 78
End: 2023-04-26
Payer: COMMERCIAL

## 2023-05-04 ENCOUNTER — TELEPHONE (OUTPATIENT)
Dept: FAMILY MEDICINE | Facility: CLINIC | Age: 78
End: 2023-05-04
Payer: COMMERCIAL

## 2023-05-04 NOTE — TELEPHONE ENCOUNTER
Forms/Letter Request    Type of form/letter: [facility]    Have you been seen for this request: Yes     Do we have the form/letter: Yes:     Who is the form from? [facility]     Form received back signed  5/2/23  Faxed Back & Sent to be scanned to this encounter  [facility]

## 2023-09-06 NOTE — PROGRESS NOTES
Melania is a 76 year old   female who presents for pessary recheck;  she currently has a Gelhorn pessary which she keeps in place continuously;  she reports no problems with her pessary, no discharge, no bleeding.. She is using vaginal estrogen twice a week.     All systems were reviewed and pertinent information in noted in subjective/HPI.     Past Medical History        Past Medical History:   Diagnosis Date    Arthritis     Charcot Emilee Tooth muscular atrophy 2010     Symptoms began at age 48. Difficulty with stairs, getting up from chairs, weakness in LE's but more so in the left.      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 Cunningham, [place], Right    COLONOSCOPY   2010              Current Outpatient Medications   Medication Instructions    ADVIL 200 MG PO CAPS ONE TO TWO TABLETS EVERY 6-8 HOURS AS NEEDED    buPROPion (WELLBUTRIN XL) 150 mg, Oral, EVERY MORNING    estradiol (ESTRACE) 0.5 MG tablet Place vaginally twice a week    melatonin 5 mg, Oral, AT BEDTIME PRN    mupirocin (BACTROBAN) 2 % external ointment Topical, 3 TIMES DAILY, For open sores    triamcinolone (KENALOG) 0.1 % external cream Topical, 2 TIMES DAILY, For severe itching    VITAMIN D, CHOLECALCIFEROL, PO 1,000 Units, Oral, DAILY      fd  Social History                Socioeconomic History    Marital status: Single       Spouse name: None    Number of children: None    Years  "of education: None    Highest education level: None   Tobacco Use    Smoking status: Never Smoker    Smokeless tobacco: Never Used   Vaping Use    Vaping Use: Never used   Substance and Sexual Activity    Alcohol use: No    Drug use: No    Sexual activity: Not Currently   Other Topics Concern    Parent/sibling w/ CABG, MI or angioplasty before 65F 55M? No         Family History             Family History   Problem Relation Age of Onset    Hypertension Mother      Musculoskeletal Disorder Mother           CMT- Charcot Emilee Tooth    Cerebrovascular Disease Father      Respiratory Brother           Emphysema    Musculoskeletal Disorder Daughter           CMT- Charcot Emilee Tooth    Other Cancer Brother           Bone and Bladder            OBJECTIVE:/69 (BP Location: Left arm, Patient Position: Chair, Cuff Size: Adult Large)   Pulse 68   Temp 99.4  F (37.4  C) (Tympanic)   Resp 16   Ht 1.702 m (5' 7\")   Wt 118.8 kg (261 lb 12.8 oz)   LMP  (LMP Unknown)   BMI 41.00 kg/m     Patient is postmenopausal.  The pessary was removed without difficulty, cleaned in hibiclens and warm water and then replaced, after inspection of the vulva, vagina and cervix which showed no erosions.     ASSESSMENT:       ICD-10-CM     1. Pessary maintenance  Z46.89              PLAN: RETURN TO CLINIC 4 months for pessary cleaning; continue twice a week vaginal estradiol     Anne Wallace MD  OB/GYN      "

## 2023-09-06 NOTE — PROGRESS NOTES
"Initial /69 (BP Location: Left arm, Patient Position: Chair, Cuff Size: Adult Large)   Pulse 68   Temp 99.4  F (37.4  C) (Tympanic)   Resp 16   Ht 1.702 m (5' 7\")   Wt 118.8 kg (261 lb 12.8 oz)   LMP  (LMP Unknown)   BMI 41.00 kg/m   Estimated body mass index is 41 kg/m  as calculated from the following:    Height as of this encounter: 1.702 m (5' 7\").    Weight as of this encounter: 118.8 kg (261 lb 12.8 oz). .    "

## 2023-09-25 NOTE — NURSING NOTE
"Chief Complaint   Patient presents with    Derm Problem       Initial BP (!) 144/70   Pulse 72   Temp 99.4  F (37.4  C) (Tympanic)   Resp 16   Ht 1.727 m (5' 8\")   Wt 118.8 kg (262 lb)   LMP  (LMP Unknown)   SpO2 96%   BMI 39.84 kg/m   Estimated body mass index is 39.84 kg/m  as calculated from the following:    Height as of this encounter: 1.727 m (5' 8\").    Weight as of this encounter: 118.8 kg (262 lb).    Patient presents to the clinic using Cane    Is there anyone who you would like to be able to receive your results? No  If yes have patient fill out CHAU      "

## 2023-09-25 NOTE — PROGRESS NOTES
Assessment & Plan     Cellulitis and abscess of leg with pruritus  Venous stasis insufficency   Symptomatic care strategies reviewed  Compression stocking   Wound care discussed  Consider wound clinic consult  - methylPREDNISolone (MEDROL DOSEPAK) 4 MG tablet therapy pack  Dispense: 21 tablet; Refill: 0  - cephALEXin (KEFLEX) 500 MG capsule  Dispense: 20 capsule; Refill: 0    Need for shingles vaccine  Need for Tdap vaccination  Thru pharmacy       Call or return to the clinic with any worsening of symptoms or no resolution. Patient/Parent verbalized understanding and is in agreement. Medication side effects reviewed.   Current Outpatient Medications   Medication Sig Dispense Refill    ADVIL 200 MG PO CAPS Take 600 mg by mouth At Bedtime  0    buPROPion (WELLBUTRIN XL) 150 MG 24 hr tablet Take 1 tablet (150 mg) by mouth every morning 90 tablet 3    cephALEXin (KEFLEX) 500 MG capsule Take 1 capsule (500 mg) by mouth 2 times daily for 10 days 20 capsule 0    estradiol (ESTRACE) 0.5 MG tablet Place vaginally twice a week 24 tablet 3    melatonin 5 MG tablet Take 1 tablet (5 mg) by mouth nightly as needed for sleep 90 tablet 3    methylPREDNISolone (MEDROL DOSEPAK) 4 MG tablet therapy pack Follow Package Directions 21 tablet 0    mupirocin (BACTROBAN) 2 % external ointment Apply topically 3 times daily For open sores 30 g 4    triamcinolone (KENALOG) 0.1 % external cream Apply topically 2 times daily For severe itching 80 g 2    VITAMIN D, CHOLECALCIFEROL, PO Take 1,000 Units by mouth daily         Chart documentation with Dragon Voice recognition Software. Although reviewed after completion, some words and grammatical errors may remain.      JOANN Dorsey CNP [facility]    Michelle Velázquez is a 77 year old, presenting for the following health issues:  Derm Problem        9/25/2023    11:12 AM   Additional Questions   Roomed by Maria De Jesus HOOD   Accompanied by self       History of  "Present Illness       Reason for visit:  Psoriasis in the left leg and right leg  Symptom onset:  More than a month  Symptoms include:  Itching, weeping, red, scaly patches  Symptom intensity:  Severe  Symptom progression:  Worsening  Had these symptoms before:  No  What makes it worse:  High humidity  What makes it better:  Putting Vaseline on the area    She eats 2-3 servings of fruits and vegetables daily.She consumes 1 sweetened beverage(s) daily.She exercises with enough effort to increase her heart rate 9 or less minutes per day.  She exercises with enough effort to increase her heart rate 3 or less days per week.   She is taking medications regularly.     Worse             Review of Systems   Constitutional, HEENT, cardiovascular, pulmonary, GI, , musculoskeletal, neuro, skin, endocrine and psych systems are negative, except as otherwise noted.      Objective    /74   Pulse 72   Temp 99.4  F (37.4  C) (Tympanic)   Resp 16   Ht 1.727 m (5' 8\")   Wt 118.8 kg (262 lb)   LMP  (LMP Unknown)   SpO2 96%   BMI 39.84 kg/m    Body mass index is 39.84 kg/m .  Physical Exam   GENERAL: healthy, alert and no distress  EYES: Eyes grossly normal to inspection, PERRL and conjunctivae and sclerae normal  HENT: ear canals and TM's normal, nose and mouth without ulcers or lesions  NECK: no adenopathy, no asymmetry, masses, or scars and thyroid normal to palpation  RESP: lungs clear to auscultation - no rales, rhonchi or wheezes  CV: regular rate and rhythm, normal S1 S2, no S3 or S4, no murmur, click or rub, no peripheral edema and peripheral pulses strong  ABDOMEN: soft, nontender, no hepatosplenomegaly, no masses and bowel sounds normal  MS: varicose veins bilatarelly, 1+LE bilateral edema and peripheral pulses normal  SKIN: erythema LE bilaterally with open superficial sores bilaterally with various stages of healing   NEURO: Normal strength and tone, mentation intact and speech normal  PSYCH: mentation " appears normal, affect normal/bright    Office Visit on 03/07/2023   Component Date Value Ref Range Status    25 OH Vitamin D2 03/07/2023 <5  ug/L Final    25 OH Vitamin D3 03/07/2023 32  ug/L Final    25 OH Vit D Total 03/07/2023 <37  20 - 75 ug/L Final    Season, race, dietary intake, and treatment affect the concentration of 25-hydroxy-Vitamin D. Values may decrease during winter months and increase during summer months. Values 20-29 ug/L may indicate Vitamin D insufficiency and values <20 ug/L may indicate Vitamin D deficiency.    TSH 03/07/2023 3.71  0.30 - 4.20 uIU/mL Final    Sodium 03/07/2023 143  136 - 145 mmol/L Final    Potassium 03/07/2023 4.4  3.4 - 5.3 mmol/L Final    Chloride 03/07/2023 105  98 - 107 mmol/L Final    Carbon Dioxide (CO2) 03/07/2023 28  22 - 29 mmol/L Final    Anion Gap 03/07/2023 10  7 - 15 mmol/L Final    Urea Nitrogen 03/07/2023 12.8  8.0 - 23.0 mg/dL Final    Creatinine 03/07/2023 0.75  0.51 - 0.95 mg/dL Final    Calcium 03/07/2023 9.8  8.8 - 10.2 mg/dL Final    Glucose 03/07/2023 86  70 - 99 mg/dL Final    Alkaline Phosphatase 03/07/2023 96  35 - 104 U/L Final    AST 03/07/2023 14  10 - 35 U/L Final    ALT 03/07/2023 12  10 - 35 U/L Final    Protein Total 03/07/2023 6.9  6.4 - 8.3 g/dL Final    Albumin 03/07/2023 4.1  3.5 - 5.2 g/dL Final    Bilirubin Total 03/07/2023 0.4  <=1.2 mg/dL Final    GFR Estimate 03/07/2023 81  >60 mL/min/1.73m2 Final    eGFR calculated using 2021 CKD-EPI equation.    LDL Cholesterol Direct 03/07/2023 113 (H)  <100 mg/dL Final    Age 2-19 years:  Desirable: 0-110 mg/dL   Borderline high: 110-129 mg/dL   High: >= 130 mg/dL    Age 20 years and older:  Desirable: <100mg/dL  Above desirable: 100-129 mg/dL   Borderline high: 130-159 mg/dL   High: 160-189 mg/dL   Very high: >= 190 mg/dL    WBC Count 03/07/2023 6.7  4.0 - 11.0 10e3/uL Final    RBC Count 03/07/2023 4.52  3.80 - 5.20 10e6/uL Final    Hemoglobin 03/07/2023 13.1  11.7 - 15.7 g/dL Final    Hematocrit  03/07/2023 41.6  35.0 - 47.0 % Final    MCV 03/07/2023 92  78 - 100 fL Final    MCH 03/07/2023 29.0  26.5 - 33.0 pg Final    MCHC 03/07/2023 31.5  31.5 - 36.5 g/dL Final    RDW 03/07/2023 14.4  10.0 - 15.0 % Final    Platelet Count 03/07/2023 236  150 - 450 10e3/uL Final    % Neutrophils 03/07/2023 55  % Final    % Lymphocytes 03/07/2023 27  % Final    % Monocytes 03/07/2023 13  % Final    % Eosinophils 03/07/2023 4  % Final    % Basophils 03/07/2023 0  % Final    % Immature Granulocytes 03/07/2023 0  % Final    Absolute Neutrophils 03/07/2023 3.7  1.6 - 8.3 10e3/uL Final    Absolute Lymphocytes 03/07/2023 1.8  0.8 - 5.3 10e3/uL Final    Absolute Monocytes 03/07/2023 0.9  0.0 - 1.3 10e3/uL Final    Absolute Eosinophils 03/07/2023 0.3  0.0 - 0.7 10e3/uL Final    Absolute Basophils 03/07/2023 0.0  0.0 - 0.2 10e3/uL Final    Absolute Immature Granulocytes 03/07/2023 0.0  <=0.4 10e3/uL Final

## 2023-10-18 NOTE — TELEPHONE ENCOUNTER
Medication Question or Refill        What medication are you calling about (include dose and sig)?: methylPREDNISolone (MEDROL DOSEPAK) 4 MG tablet therapy pack     Preferred Pharmacy:   [place] [id number] [place] [address] [address]  Phone: [phone] Fax: [phone]      Controlled Substance Agreement on file:   CSA -- Patient Level:    CSA: None found at the patient level.       Who prescribed the medication?: Dagoberto    Do you need a refill? Yes    Patient offered an appointment? No    Do you have any questions or concerns?  Yes: Pt states cellulitis has returned since last visit 9/25. Pt stated it looks the same as it did at her 9/25 visit and requesting the same prescription or recommendations on proceeding      Could we send this information to you in [facility] or would you prefer to receive a phone call?:   Patient would prefer a phone call   Okay to leave a detailed message?: Yes at Home number on file [phone] (home)

## 2023-10-18 NOTE — TELEPHONE ENCOUNTER
S-(situation): I talked with Melania. C/O right lower leg weeping, no open areas.  Left leg has some weeping open areas she says.  Legs are both swollen.   Has not been wearing compression stockings.  Legs like this past 2 weeks.  Unable to get in earlier because of family emergency.  Legs did get better for a bit after being seen on 9/25/23.    B-(background): history psoriasis both legs.    A-(assessment): bilateral leg weeping and sores    R-(recommendations): advised that she needs to be seen.  Declines going to the Urgent Care  Transferred to the appointment desk to schedule appointment.  Danika Pisano RN

## 2023-12-06 NOTE — PROGRESS NOTES
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   - mupirocin (BACTROBAN) 2 % external ointment  Dispense: 30 g; Refill: 4  Begin   - cephALEXin (KEFLEX) 500 MG capsule  Dispense: 20 capsule; Refill: 0    Primary osteoarthritis of both knees  RASH from Ibuprofen recently  Trial   - meloxicam (MOBIC) 15 MG tablet  Dispense: 30 tablet; Refill: 2    CMT (Charcot-Emilee-Tooth disease)  Follow up with neurology as previously directed.       Follow up 2 weeks with ongoing symptoms  Call or return to the clinic with any worsening of symptoms or no resolution. Patient/Parent verbalized understanding and is in agreement. Medication side effects reviewed.   Current Outpatient Medications   Medication Sig Dispense Refill    ADVIL 200 MG PO CAPS Take 600 mg by mouth At Bedtime  0    buPROPion (WELLBUTRIN XL) 150 MG 24 hr tablet Take 1 tablet (150 mg) by mouth every morning 90 tablet 3    cephALEXin (KEFLEX) 500 MG capsule Take 1 capsule (500 mg) by mouth 2 times daily 20 capsule 0    estradiol (ESTRACE) 0.5 MG tablet Place vaginally twice a week 24 tablet 3    melatonin 5 MG tablet Take 1 tablet (5 mg) by mouth nightly as needed for sleep 90 tablet 3    meloxicam (MOBIC) 15 MG tablet Take 1 tablet (15 mg) by mouth daily 30 tablet 2    methylPREDNISolone (MEDROL DOSEPAK) 4 MG tablet therapy pack Follow Package Directions 21 tablet 0    mupirocin (BACTROBAN) 2 % external ointment Apply topically 3 times daily For open sores 30 g 4    triamcinolone  "(KENALOG) 0.1 % external cream Apply topically 2 times daily For severe itching 80 g 2    VITAMIN D, CHOLECALCIFEROL, PO Take 1,000 Units by mouth daily       Chart documentation with Dragon Voice recognition Software. Although reviewed after completion, some words and grammatical errors may remain.      JOANN Dorsey CNP  [facility]    Michelle Velázquez is a 78 year old, presenting for the following health issues:  Rash and Cellulitis (Right leg )        12/6/2023     9:53 AM   Additional Questions   Roomed by diego   Accompanied by self       History of Present Illness       Reason for visit:  Cellulitis and rash    She eats 2-3 servings of fruits and vegetables daily.She consumes 1 sweetened beverage(s) daily.She exercises with enough effort to increase her heart rate 9 or less minutes per day.  She exercises with enough effort to increase her heart rate 3 or less days per week.   She is taking medications regularly.     Right leg cellulitis   Recheck   Started in august 2023       Declines wellness visit     Having a rash from ibuprofen   Has been taking a lot of it due to joint pain   LE leg pain  Tylenol is not effective                 Review of Systems   Skin:  Positive for rash.      Constitutional, HEENT, cardiovascular, pulmonary, gi and gu systems are negative, except as otherwise noted.      Objective    /70   Pulse 83   Temp 97.8  F (36.6  C) (Tympanic)   Resp 14   Ht 1.727 m (5' 8\")   Wt 118.8 kg (262 lb)   LMP  (LMP Unknown)   SpO2 99%   BMI 39.84 kg/m    Body mass index is 39.84 kg/m .  Physical Exam   GENERAL: alert, no distress, and ambulating with a cane   EYES: Eyes grossly normal to inspection, PERRL and conjunctivae and sclerae normal  HENT: ear canals and TM's normal, nose and mouth without ulcers or lesions  NECK: no adenopathy, no asymmetry, masses, or scars and thyroid normal to palpation  RESP: lungs clear to auscultation - no rales, " rhonchi or wheezes  CV: regular rate and rhythm, normal S1 S2, no S3 or S4, no murmur, click or rub, no peripheral edema and peripheral pulses strong  ABDOMEN: soft, nontender, no hepatosplenomegaly, no masses and bowel sounds normal  MS: decreased range of motion LE bilaterally , peripheral pulses normal, and tenderness to palpation LE bilaterally   SKIN: open lesions LE bilaterally    Ulceration dime size right posterior calf  LE warm to touch with erythema left > right   NEURO: mentation intact and speech normal  PSYCH: mentation appears normal, affect normal/bright    Office Visit on 03/07/2023   Component Date Value Ref Range Status    25 OH Vitamin D2 03/07/2023 <5  ug/L Final    25 OH Vitamin D3 03/07/2023 32  ug/L Final    25 OH Vit D Total 03/07/2023 <37  20 - 75 ug/L Final    Season, race, dietary intake, and treatment affect the concentration of 25-hydroxy-Vitamin D. Values may decrease during winter months and increase during summer months. Values 20-29 ug/L may indicate Vitamin D insufficiency and values <20 ug/L may indicate Vitamin D deficiency.    TSH 03/07/2023 3.71  0.30 - 4.20 uIU/mL Final    Sodium 03/07/2023 143  136 - 145 mmol/L Final    Potassium 03/07/2023 4.4  3.4 - 5.3 mmol/L Final    Chloride 03/07/2023 105  98 - 107 mmol/L Final    Carbon Dioxide (CO2) 03/07/2023 28  22 - 29 mmol/L Final    Anion Gap 03/07/2023 10  7 - 15 mmol/L Final    Urea Nitrogen 03/07/2023 12.8  8.0 - 23.0 mg/dL Final    Creatinine 03/07/2023 0.75  0.51 - 0.95 mg/dL Final    Calcium 03/07/2023 9.8  8.8 - 10.2 mg/dL Final    Glucose 03/07/2023 86  70 - 99 mg/dL Final    Alkaline Phosphatase 03/07/2023 96  35 - 104 U/L Final    AST 03/07/2023 14  10 - 35 U/L Final    ALT 03/07/2023 12  10 - 35 U/L Final    Protein Total 03/07/2023 6.9  6.4 - 8.3 g/dL Final    Albumin 03/07/2023 4.1  3.5 - 5.2 g/dL Final    Bilirubin Total 03/07/2023 0.4  <=1.2 mg/dL Final    GFR Estimate 03/07/2023 81  >60 mL/min/1.73m2 Final    eGFR  calculated using 2021 CKD-EPI equation.    LDL Cholesterol Direct 03/07/2023 113 (H)  <100 mg/dL Final    Age 2-19 years:  Desirable: 0-110 mg/dL   Borderline high: 110-129 mg/dL   High: >= 130 mg/dL    Age 20 years and older:  Desirable: <100mg/dL  Above desirable: 100-129 mg/dL   Borderline high: 130-159 mg/dL   High: 160-189 mg/dL   Very high: >= 190 mg/dL    WBC Count 03/07/2023 6.7  4.0 - 11.0 10e3/uL Final    RBC Count 03/07/2023 4.52  3.80 - 5.20 10e6/uL Final    Hemoglobin 03/07/2023 13.1  11.7 - 15.7 g/dL Final    Hematocrit 03/07/2023 41.6  35.0 - 47.0 % Final    MCV 03/07/2023 92  78 - 100 fL Final    MCH 03/07/2023 29.0  26.5 - 33.0 pg Final    MCHC 03/07/2023 31.5  31.5 - 36.5 g/dL Final    RDW 03/07/2023 14.4  10.0 - 15.0 % Final    Platelet Count 03/07/2023 236  150 - 450 10e3/uL Final    % Neutrophils 03/07/2023 55  % Final    % Lymphocytes 03/07/2023 27  % Final    % Monocytes 03/07/2023 13  % Final    % Eosinophils 03/07/2023 4  % Final    % Basophils 03/07/2023 0  % Final    % Immature Granulocytes 03/07/2023 0  % Final    Absolute Neutrophils 03/07/2023 3.7  1.6 - 8.3 10e3/uL Final    Absolute Lymphocytes 03/07/2023 1.8  0.8 - 5.3 10e3/uL Final    Absolute Monocytes 03/07/2023 0.9  0.0 - 1.3 10e3/uL Final    Absolute Eosinophils 03/07/2023 0.3  0.0 - 0.7 10e3/uL Final    Absolute Basophils 03/07/2023 0.0  0.0 - 0.2 10e3/uL Final    Absolute Immature Granulocytes 03/07/2023 0.0  <=0.4 10e3/uL Final

## 2023-12-26 NOTE — TELEPHONE ENCOUNTER
Forms Request      [facility] Clarification Verbal Order    Form received back signed  12/26/23  Faxed Back & Sent to be scanned to this encounter  Annabelle [place] Sec

## 2024-01-01 ENCOUNTER — OFFICE VISIT (OUTPATIENT)
Dept: OBGYN | Facility: CLINIC | Age: 79
End: 2024-01-01
Payer: COMMERCIAL

## 2024-01-01 ENCOUNTER — LAB REQUISITION (OUTPATIENT)
Dept: LAB | Facility: CLINIC | Age: 79
End: 2024-01-01
Payer: COMMERCIAL

## 2024-01-01 ENCOUNTER — HEALTH MAINTENANCE LETTER (OUTPATIENT)
Age: 79
End: 2024-01-01

## 2024-01-01 VITALS
RESPIRATION RATE: 16 BRPM | BODY MASS INDEX: 40.15 KG/M2 | WEIGHT: 264.9 LBS | HEIGHT: 68 IN | DIASTOLIC BLOOD PRESSURE: 100 MMHG | TEMPERATURE: 99.4 F | SYSTOLIC BLOOD PRESSURE: 147 MMHG | HEART RATE: 80 BPM

## 2024-01-01 DIAGNOSIS — F33.2 SEVERE EPISODE OF RECURRENT MAJOR DEPRESSIVE DISORDER, WITHOUT PSYCHOTIC FEATURES (H): ICD-10-CM

## 2024-01-01 DIAGNOSIS — Z46.89 PESSARY MAINTENANCE: Primary | ICD-10-CM

## 2024-01-01 DIAGNOSIS — Z46.89 PESSARY MAINTENANCE: ICD-10-CM

## 2024-01-01 DIAGNOSIS — M17.0 PRIMARY OSTEOARTHRITIS OF BOTH KNEES: ICD-10-CM

## 2024-01-01 DIAGNOSIS — I10 ESSENTIAL (PRIMARY) HYPERTENSION: ICD-10-CM

## 2024-01-01 LAB
ANION GAP SERPL CALCULATED.3IONS-SCNC: 13 MMOL/L (ref 7–15)
BUN SERPL-MCNC: 18.7 MG/DL (ref 8–23)
CALCIUM SERPL-MCNC: 8.4 MG/DL (ref 8.8–10.2)
CHLORIDE SERPL-SCNC: 103 MMOL/L (ref 98–107)
CREAT SERPL-MCNC: 0.62 MG/DL (ref 0.51–0.95)
CRP SERPL HS-MCNC: >300 MG/L
CRP SERPL-MCNC: 340.12 MG/L
DEPRECATED HCO3 PLAS-SCNC: 24 MMOL/L (ref 22–29)
EGFRCR SERPLBLD CKD-EPI 2021: 90 ML/MIN/1.73M2
ERYTHROCYTE [DISTWIDTH] IN BLOOD BY AUTOMATED COUNT: 15.9 % (ref 10–15)
GLUCOSE SERPL-MCNC: 77 MG/DL (ref 70–99)
HCT VFR BLD AUTO: 29.8 % (ref 35–47)
HGB BLD-MCNC: 9.9 G/DL (ref 11.7–15.7)
MCH RBC QN AUTO: 27 PG (ref 26.5–33)
MCHC RBC AUTO-ENTMCNC: 33.2 G/DL (ref 31.5–36.5)
MCV RBC AUTO: 81 FL (ref 78–100)
PLATELET # BLD AUTO: 249 10E3/UL (ref 150–450)
POTASSIUM SERPL-SCNC: 4 MMOL/L (ref 3.4–5.3)
RBC # BLD AUTO: 3.67 10E6/UL (ref 3.8–5.2)
SODIUM SERPL-SCNC: 140 MMOL/L (ref 135–145)
WBC # BLD AUTO: 11.3 10E3/UL (ref 4–11)

## 2024-01-01 PROCEDURE — 99213 OFFICE O/P EST LOW 20 MIN: CPT | Performed by: OBSTETRICS & GYNECOLOGY

## 2024-01-01 PROCEDURE — 86140 C-REACTIVE PROTEIN: CPT | Mod: ORL | Performed by: NURSE PRACTITIONER

## 2024-01-01 PROCEDURE — 85027 COMPLETE CBC AUTOMATED: CPT | Mod: ORL | Performed by: NURSE PRACTITIONER

## 2024-01-01 PROCEDURE — 86141 C-REACTIVE PROTEIN HS: CPT | Mod: ORL | Performed by: NURSE PRACTITIONER

## 2024-01-01 PROCEDURE — 80048 BASIC METABOLIC PNL TOTAL CA: CPT | Mod: ORL | Performed by: NURSE PRACTITIONER

## 2024-01-01 PROCEDURE — 36415 COLL VENOUS BLD VENIPUNCTURE: CPT | Mod: ORL | Performed by: NURSE PRACTITIONER

## 2024-01-01 PROCEDURE — P9603 ONE-WAY ALLOW PRORATED MILES: HCPCS | Mod: ORL | Performed by: NURSE PRACTITIONER

## 2024-01-01 RX ORDER — BUPROPION HYDROCHLORIDE 150 MG/1
150 TABLET ORAL EVERY MORNING
Qty: 90 TABLET | Refills: 0 | Status: SHIPPED | OUTPATIENT
Start: 2024-01-01

## 2024-01-01 RX ORDER — ESTRADIOL 0.5 MG/1
TABLET ORAL
Qty: 24 TABLET | Refills: 2 | Status: SHIPPED | OUTPATIENT
Start: 2024-01-01

## 2024-01-01 RX ORDER — MELOXICAM 15 MG/1
15 TABLET ORAL DAILY
Qty: 90 TABLET | Refills: 0 | Status: SHIPPED | OUTPATIENT
Start: 2024-01-01

## 2024-01-01 RX ORDER — MELOXICAM 15 MG/1
15 TABLET ORAL DAILY
Qty: 90 TABLET | Refills: 0 | OUTPATIENT
Start: 2024-01-01

## 2024-01-01 ASSESSMENT — PATIENT HEALTH QUESTIONNAIRE - PHQ9: SUM OF ALL RESPONSES TO PHQ QUESTIONS 1-9: 2

## 2024-02-16 NOTE — PROGRESS NOTES
"Initial BP (!) 147/100 (BP Location: Left arm, Patient Position: Chair, Cuff Size: Adult Large)   Pulse 80   Temp 99.4  F (37.4  C) (Tympanic)   Resp 16   Ht 1.727 m (5' 8\")   Wt 120.2 kg (264 lb 14.4 oz)   LMP  (LMP Unknown)   BMI 40.28 kg/m   Estimated body mass index is 40.28 kg/m  as calculated from the following:    Height as of this encounter: 1.727 m (5' 8\").    Weight as of this encounter: 120.2 kg (264 lb 14.4 oz). .    "

## 2024-02-16 NOTE — PROGRESS NOTES
"Melania is a 76 year old   female who presents for pessary recheck;  she currently has a Gelhorn pessary which she keeps in place continuously;  she reports no problems with her pessary, no discharge, no bleeding.. She is using vaginal estrogen twice a week.     All systems were reviewed and pertinent information in noted in subjective/HPI.    Past Medical History:   Diagnosis Date    Arthritis     Charcot Emilee Tooth muscular atrophy 2010    Symptoms began at age 48. Difficulty with stairs, getting up from chairs, weakness in LE's but more so in the left.      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 Cunningham, [place], Right    COLONOSCOPY  2010     I have reviewed this patient's family history and updated it with pertinent information if needed.  Family History   Problem Relation Age of Onset    Hypertension Mother     Musculoskeletal Disorder Mother         CMT- Charcot Emilee Tooth    Cerebrovascular Disease Father     Respiratory Brother         Emphysema    Musculoskeletal Disorder Daughter         CMT- Charcot Emilee Tooth    Other Cancer Brother         Bone and Bladder      Objective: BP (!) 147/100 (BP Location: Left arm, Patient Position: Chair, Cuff Size: Adult Large)   Pulse 80   Temp 99.4  F (37.4  C) (Tympanic)   Resp 16   Ht 1.727 m (5' 8\")   Wt 120.2 kg (264 lb 14.4 oz)   LMP  (LMP Unknown)   BMI 40.28 kg/m    Pessary removed without difficulty, normal external genitalia, " vagina and cervix without significant discharge, lesions or abrasions  Bilateral lower extremities with vascular insufficiency and cellulitis    A/P: 79 yo postmenopausal female who presents for pessary maintenance in the setting of pelvic organ prolapse.  Pessary removed, clean and replaced. Con vaginal estrogen and RTC in 4 months.   Her PCP is managing her cellulitis.       Anne Wallace MD  OB/GYN

## 2024-02-27 NOTE — TELEPHONE ENCOUNTER
Requested Prescriptions   Pending Prescriptions Disp Refills    meloxicam (MOBIC) 15 MG tablet [Pharmacy Med Name: MELOXICAM 15 MG TABLET] 30 tablet 2     Sig: TAKE 1 TABLET BY MOUTH EVERY DAY       NSAID Medications Failed - 2/27/2024 12:42 AM        Failed - Blood pressure under 140/90 in past 12 months     BP Readings from Last 3 Encounters:   02/16/24 (!) 147/100   12/06/23 126/70   09/25/23 130/74                 Failed - Patient is age 6-64 years        Failed - Always Fail Criteria - Chart Review Required     Validate Diagnosis. If the medication is requested for an acute flare of a chronic pain associated with a musculoskeletal or rheumatologic condition; okay to authorize if all other criteria are met. If not, then forward to provider for review.          Passed - Normal ALT on file in past 12 months     Recent Labs   Lab Test 03/07/23  1227   ALT 12             Passed - Normal AST on file in past 12 months     Recent Labs   Lab Test 03/07/23  1227   AST 14             Passed - Normal CBC on file in past 12 months     Recent Labs   Lab Test 03/07/23  1227   WBC 6.7   RBC 4.52   HGB 13.1   HCT 41.6                    Passed - Medication is active on med list        Passed - Normal GFR on file in past 12 months     Recent Labs   Lab Test 03/07/23  1227 05/06/21  1131   GFRESTIMATED 81 75   GFRESTBLACK  --  86             Passed - Recent (12 mo) or future (90 days) visit within the authorizing provider's specialty     The patient must have completed an in-person or virtual visit within the past 12 months or has a future visit scheduled within the next 90 days with the authorizing provider s specialty.  Urgent care and e-visits do not quality as an office visit for this protocol.          Passed - No active pregnancy on record        Passed - Normal serum creatinine on file in past 12 months     Recent Labs   Lab Test 03/07/23  1227   CR 0.75       Ok to refill medication if creatinine is low           Passed - No positive pregnancy test in past 12 months

## 2024-05-29 NOTE — TELEPHONE ENCOUNTER
Had recent significant hospitalization. Would need appointment with PCP to determine if this should be continued.

## 2024-05-29 NOTE — TELEPHONE ENCOUNTER
Left message for patient to call care team.     Patient requesting the following Medication:   Meloxicam

## 2024-07-10 DIAGNOSIS — F33.2 SEVERE EPISODE OF RECURRENT MAJOR DEPRESSIVE DISORDER, WITHOUT PSYCHOTIC FEATURES (H): ICD-10-CM

## 2024-07-10 RX ORDER — BUPROPION HYDROCHLORIDE 150 MG/1
150 TABLET ORAL EVERY MORNING
Qty: 90 TABLET | Refills: 0 | OUTPATIENT
Start: 2024-07-10